# Patient Record
Sex: FEMALE | Race: ASIAN | NOT HISPANIC OR LATINO
[De-identification: names, ages, dates, MRNs, and addresses within clinical notes are randomized per-mention and may not be internally consistent; named-entity substitution may affect disease eponyms.]

---

## 2017-09-06 ENCOUNTER — APPOINTMENT (OUTPATIENT)
Dept: MRI IMAGING | Facility: HOSPITAL | Age: 67
End: 2017-09-06

## 2017-09-06 ENCOUNTER — OUTPATIENT (OUTPATIENT)
Dept: OUTPATIENT SERVICES | Facility: HOSPITAL | Age: 67
LOS: 1 days | Discharge: HOME | End: 2017-09-06
Payer: MEDICARE

## 2017-09-06 DIAGNOSIS — R41.3 OTHER AMNESIA: ICD-10-CM

## 2017-09-06 PROCEDURE — 70551 MRI BRAIN STEM W/O DYE: CPT | Mod: 26

## 2018-02-16 ENCOUNTER — EMERGENCY (EMERGENCY)
Facility: HOSPITAL | Age: 68
LOS: 0 days | Discharge: HOME | End: 2018-02-16
Attending: EMERGENCY MEDICINE
Payer: MEDICARE

## 2018-02-16 VITALS
DIASTOLIC BLOOD PRESSURE: 82 MMHG | RESPIRATION RATE: 16 BRPM | SYSTOLIC BLOOD PRESSURE: 175 MMHG | WEIGHT: 164.91 LBS | HEIGHT: 64 IN | TEMPERATURE: 97 F | HEART RATE: 60 BPM | OXYGEN SATURATION: 100 %

## 2018-02-16 VITALS
OXYGEN SATURATION: 100 % | SYSTOLIC BLOOD PRESSURE: 150 MMHG | HEART RATE: 60 BPM | RESPIRATION RATE: 17 BRPM | DIASTOLIC BLOOD PRESSURE: 76 MMHG

## 2018-02-16 DIAGNOSIS — R10.10 UPPER ABDOMINAL PAIN, UNSPECIFIED: ICD-10-CM

## 2018-02-16 DIAGNOSIS — R55 SYNCOPE AND COLLAPSE: ICD-10-CM

## 2018-02-16 DIAGNOSIS — R42 DIZZINESS AND GIDDINESS: ICD-10-CM

## 2018-02-16 DIAGNOSIS — E11.9 TYPE 2 DIABETES MELLITUS WITHOUT COMPLICATIONS: ICD-10-CM

## 2018-02-16 DIAGNOSIS — I10 ESSENTIAL (PRIMARY) HYPERTENSION: ICD-10-CM

## 2018-02-16 DIAGNOSIS — R51 HEADACHE: ICD-10-CM

## 2018-02-16 LAB
ALBUMIN SERPL ELPH-MCNC: 3.7 G/DL — SIGNIFICANT CHANGE UP (ref 3.3–5)
ALP SERPL-CCNC: 71 U/L — SIGNIFICANT CHANGE UP (ref 40–120)
ALT FLD-CCNC: 28 U/L — SIGNIFICANT CHANGE UP (ref 12–78)
ANION GAP SERPL CALC-SCNC: 11 MMOL/L — SIGNIFICANT CHANGE UP (ref 5–17)
APPEARANCE UR: CLEAR — SIGNIFICANT CHANGE UP
AST SERPL-CCNC: 34 U/L — SIGNIFICANT CHANGE UP (ref 15–37)
BASOPHILS # BLD AUTO: 0.02 K/UL — SIGNIFICANT CHANGE UP (ref 0–0.2)
BASOPHILS NFR BLD AUTO: 0.2 % — SIGNIFICANT CHANGE UP (ref 0–2)
BILIRUB SERPL-MCNC: 0.4 MG/DL — SIGNIFICANT CHANGE UP (ref 0.2–1.2)
BILIRUB UR-MCNC: NEGATIVE — SIGNIFICANT CHANGE UP
BUN SERPL-MCNC: 15 MG/DL — SIGNIFICANT CHANGE UP (ref 7–23)
CALCIUM SERPL-MCNC: 9.3 MG/DL — SIGNIFICANT CHANGE UP (ref 8.5–10.1)
CHLORIDE SERPL-SCNC: 102 MMOL/L — SIGNIFICANT CHANGE UP (ref 96–108)
CK MB CFR SERPL CALC: 2.5 NG/ML — SIGNIFICANT CHANGE UP (ref 0.5–3.6)
CO2 SERPL-SCNC: 26 MMOL/L — SIGNIFICANT CHANGE UP (ref 22–31)
COLOR SPEC: YELLOW — SIGNIFICANT CHANGE UP
CREAT SERPL-MCNC: 0.94 MG/DL — SIGNIFICANT CHANGE UP (ref 0.5–1.3)
DIFF PNL FLD: NEGATIVE — SIGNIFICANT CHANGE UP
EOSINOPHIL # BLD AUTO: 0.2 K/UL — SIGNIFICANT CHANGE UP (ref 0–0.5)
EOSINOPHIL NFR BLD AUTO: 2.3 % — SIGNIFICANT CHANGE UP (ref 0–6)
GLUCOSE BLDC GLUCOMTR-MCNC: 213 MG/DL — HIGH (ref 70–99)
GLUCOSE SERPL-MCNC: 215 MG/DL — HIGH (ref 70–99)
GLUCOSE UR QL: 1000 MG/DL
HCT VFR BLD CALC: 37.7 % — SIGNIFICANT CHANGE UP (ref 34.5–45)
HGB BLD-MCNC: 12.8 G/DL — SIGNIFICANT CHANGE UP (ref 11.5–15.5)
IMM GRANULOCYTES NFR BLD AUTO: 0.2 % — SIGNIFICANT CHANGE UP (ref 0–1.5)
INR BLD: 0.93 RATIO — SIGNIFICANT CHANGE UP (ref 0.88–1.16)
KETONES UR-MCNC: NEGATIVE — SIGNIFICANT CHANGE UP
LEUKOCYTE ESTERASE UR-ACNC: NEGATIVE — SIGNIFICANT CHANGE UP
LYMPHOCYTES # BLD AUTO: 1.77 K/UL — SIGNIFICANT CHANGE UP (ref 1–3.3)
LYMPHOCYTES # BLD AUTO: 20.2 % — SIGNIFICANT CHANGE UP (ref 13–44)
MAGNESIUM SERPL-MCNC: 2 MG/DL — SIGNIFICANT CHANGE UP (ref 1.6–2.6)
MCHC RBC-ENTMCNC: 27.4 PG — SIGNIFICANT CHANGE UP (ref 27–34)
MCHC RBC-ENTMCNC: 34 GM/DL — SIGNIFICANT CHANGE UP (ref 32–36)
MCV RBC AUTO: 80.6 FL — SIGNIFICANT CHANGE UP (ref 80–100)
MONOCYTES # BLD AUTO: 0.39 K/UL — SIGNIFICANT CHANGE UP (ref 0–0.9)
MONOCYTES NFR BLD AUTO: 4.4 % — SIGNIFICANT CHANGE UP (ref 2–14)
NEUTROPHILS # BLD AUTO: 6.37 K/UL — SIGNIFICANT CHANGE UP (ref 1.8–7.4)
NEUTROPHILS NFR BLD AUTO: 72.7 % — SIGNIFICANT CHANGE UP (ref 43–77)
NITRITE UR-MCNC: NEGATIVE — SIGNIFICANT CHANGE UP
NRBC # BLD: 0 /100 WBCS — SIGNIFICANT CHANGE UP (ref 0–0)
NT-PROBNP SERPL-SCNC: 63 PG/ML — SIGNIFICANT CHANGE UP (ref 0–125)
PH UR: 8 — SIGNIFICANT CHANGE UP (ref 5–8)
PLATELET # BLD AUTO: 213 K/UL — SIGNIFICANT CHANGE UP (ref 150–400)
POTASSIUM SERPL-MCNC: 4.1 MMOL/L — SIGNIFICANT CHANGE UP (ref 3.5–5.3)
POTASSIUM SERPL-SCNC: 4.1 MMOL/L — SIGNIFICANT CHANGE UP (ref 3.5–5.3)
PROT SERPL-MCNC: 7.9 GM/DL — SIGNIFICANT CHANGE UP (ref 6–8.3)
PROT UR-MCNC: NEGATIVE MG/DL — SIGNIFICANT CHANGE UP
PROTHROM AB SERPL-ACNC: 10.1 SEC — SIGNIFICANT CHANGE UP (ref 9.8–12.7)
RBC # BLD: 4.68 M/UL — SIGNIFICANT CHANGE UP (ref 3.8–5.2)
RBC # FLD: 13.6 % — SIGNIFICANT CHANGE UP (ref 10.3–14.5)
SODIUM SERPL-SCNC: 139 MMOL/L — SIGNIFICANT CHANGE UP (ref 135–145)
SP GR SPEC: 1.01 — SIGNIFICANT CHANGE UP (ref 1.01–1.02)
TROPONIN I SERPL-MCNC: <.015 NG/ML — SIGNIFICANT CHANGE UP (ref 0.01–0.04)
TROPONIN I SERPL-MCNC: <.015 NG/ML — SIGNIFICANT CHANGE UP (ref 0.01–0.04)
UROBILINOGEN FLD QL: NEGATIVE MG/DL — SIGNIFICANT CHANGE UP
WBC # BLD: 8.77 K/UL — SIGNIFICANT CHANGE UP (ref 3.8–10.5)
WBC # FLD AUTO: 8.77 K/UL — SIGNIFICANT CHANGE UP (ref 3.8–10.5)

## 2018-02-16 PROCEDURE — 99285 EMERGENCY DEPT VISIT HI MDM: CPT

## 2018-02-16 PROCEDURE — 71275 CT ANGIOGRAPHY CHEST: CPT | Mod: 26

## 2018-02-16 PROCEDURE — 70450 CT HEAD/BRAIN W/O DYE: CPT | Mod: 26

## 2018-02-16 PROCEDURE — 74174 CTA ABD&PLVS W/CONTRAST: CPT | Mod: 26

## 2018-02-16 RX ORDER — LABETALOL HCL 100 MG
5 TABLET ORAL ONCE
Qty: 0 | Refills: 0 | Status: COMPLETED | OUTPATIENT
Start: 2018-02-16 | End: 2018-02-16

## 2018-02-16 RX ORDER — LABETALOL HCL 100 MG
10 TABLET ORAL ONCE
Qty: 0 | Refills: 0 | Status: DISCONTINUED | OUTPATIENT
Start: 2018-02-16 | End: 2018-02-16

## 2018-02-16 RX ORDER — NIFEDIPINE 30 MG
60 TABLET, EXTENDED RELEASE 24 HR ORAL ONCE
Qty: 0 | Refills: 0 | Status: COMPLETED | OUTPATIENT
Start: 2018-02-16 | End: 2018-02-16

## 2018-02-16 RX ADMIN — Medication 60 MILLIGRAM(S): at 15:19

## 2018-02-16 RX ADMIN — Medication 5 MILLIGRAM(S): at 14:43
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
## 2018-02-16 NOTE — ED PROVIDER NOTE - MEDICAL DECISION MAKING DETAILS
patient pw likely presyncope. not likely acs given 2 negative troponins. possibly hypoglycemia since patient did not eat all morning. Another possibility is symptomatic hypertension, since resolving the marked hypertension with iv labetalol resolved symptoms. will have patient continue her regimen as prescribed. okay for dc. patient pw likely presyncope. not likely acs given 2 negative troponins. possibly hypoglycemia since patient did not eat all morning. Another possibility is symptomatic hypertension, since resolving the marked hypertension with iv labetalol resolved symptoms. will have patient continue her regimen as prescribed. As interpreted by ED physician, ECG is NSR with normal intervals/axis, no changes in QRS, no ST/T changes.   okay for dc.

## 2018-02-16 NOTE — ED ADULT NURSE NOTE - OBJECTIVE STATEMENT
BIBA after having an episode of feeling "light headed and nearly blacking out" reports having woken up right away, and was scared of what was not going on.

## 2018-02-17 LAB
CULTURE RESULTS: SIGNIFICANT CHANGE UP
SPECIMEN SOURCE: SIGNIFICANT CHANGE UP

## 2019-04-28 ENCOUNTER — EMERGENCY (EMERGENCY)
Facility: HOSPITAL | Age: 69
LOS: 0 days | Discharge: HOME | End: 2019-04-28
Attending: EMERGENCY MEDICINE
Payer: MEDICARE

## 2019-04-28 VITALS
OXYGEN SATURATION: 100 % | SYSTOLIC BLOOD PRESSURE: 143 MMHG | RESPIRATION RATE: 18 BRPM | TEMPERATURE: 98 F | HEART RATE: 63 BPM | DIASTOLIC BLOOD PRESSURE: 80 MMHG

## 2019-04-28 VITALS
TEMPERATURE: 98 F | SYSTOLIC BLOOD PRESSURE: 152 MMHG | RESPIRATION RATE: 19 BRPM | WEIGHT: 149.91 LBS | HEIGHT: 65 IN | HEART RATE: 67 BPM | OXYGEN SATURATION: 100 % | DIASTOLIC BLOOD PRESSURE: 77 MMHG

## 2019-04-28 DIAGNOSIS — R39.9 UNSPECIFIED SYMPTOMS AND SIGNS INVOLVING THE GENITOURINARY SYSTEM: ICD-10-CM

## 2019-04-28 DIAGNOSIS — I10 ESSENTIAL (PRIMARY) HYPERTENSION: ICD-10-CM

## 2019-04-28 DIAGNOSIS — B37.3 CANDIDIASIS OF VULVA AND VAGINA: ICD-10-CM

## 2019-04-28 DIAGNOSIS — E11.9 TYPE 2 DIABETES MELLITUS WITHOUT COMPLICATIONS: ICD-10-CM

## 2019-04-28 LAB
APPEARANCE UR: CLEAR — SIGNIFICANT CHANGE UP
BILIRUB UR-MCNC: NEGATIVE — SIGNIFICANT CHANGE UP
COLOR SPEC: YELLOW — SIGNIFICANT CHANGE UP
DIFF PNL FLD: NEGATIVE — SIGNIFICANT CHANGE UP
GLUCOSE UR QL: 1000 MG/DL
KETONES UR-MCNC: NEGATIVE — SIGNIFICANT CHANGE UP
LEUKOCYTE ESTERASE UR-ACNC: NEGATIVE — SIGNIFICANT CHANGE UP
NITRITE UR-MCNC: NEGATIVE — SIGNIFICANT CHANGE UP
PH UR: 8 — SIGNIFICANT CHANGE UP (ref 5–8)
PROT UR-MCNC: NEGATIVE MG/DL — SIGNIFICANT CHANGE UP
SP GR SPEC: 1.01 — SIGNIFICANT CHANGE UP (ref 1.01–1.02)
UROBILINOGEN FLD QL: NEGATIVE MG/DL — SIGNIFICANT CHANGE UP

## 2019-04-28 PROCEDURE — 99283 EMERGENCY DEPT VISIT LOW MDM: CPT

## 2019-04-28 RX ORDER — FLUCONAZOLE 150 MG/1
1 TABLET ORAL
Qty: 3 | Refills: 0
Start: 2019-04-28 | End: 2019-04-30

## 2019-04-28 RX ORDER — FLUCONAZOLE 150 MG/1
150 TABLET ORAL ONCE
Qty: 0 | Refills: 0 | Status: COMPLETED | OUTPATIENT
Start: 2019-04-28 | End: 2019-04-28

## 2019-04-28 RX ORDER — NYSTATIN CREAM 100000 [USP'U]/G
1 CREAM TOPICAL
Qty: 1 | Refills: 0
Start: 2019-04-28 | End: 2019-05-11

## 2019-04-28 RX ORDER — NYSTATIN CREAM 100000 [USP'U]/G
1 CREAM TOPICAL ONCE
Qty: 0 | Refills: 0 | Status: COMPLETED | OUTPATIENT
Start: 2019-04-28 | End: 2019-04-28

## 2019-04-28 RX ADMIN — FLUCONAZOLE 150 MILLIGRAM(S): 150 TABLET ORAL at 12:53

## 2019-04-28 RX ADMIN — NYSTATIN CREAM 1 APPLICATION(S): 100000 CREAM TOPICAL at 12:54

## 2019-04-28 NOTE — ED PROVIDER NOTE - OBJECTIVE STATEMENT
Pertinent PMH/PSH/FHx/SHx and Review of Systems contained within:  [id number]  69f hx dementia, htn, dm, hld pw pain in the vagina and urethra x4 months. patient notes symptoms all the time but romana with urinating. started in eligio, given many medications but it did not successfully treat her symptoms. she has no nausea, vomiting, fever, chills, abd pain, rash, bleeding, weakness, cp, sob, ha, vision loss, rhinorrhea.   Fh and Sh not otherwise contributory  ROS otherwise negative

## 2019-04-28 NOTE — ED PROVIDER NOTE - PHYSICAL EXAMINATION
Gen: Alert, NAD  Head: NC, AT   Eyes: PERRL, EOMI, normal lids/conjunctiva  ENT: normal hearing, patent oropharynx without erythema/exudate, uvula midline  Neck: supple, no tenderness, Trachea midline  Pulm: Bilateral BS, normal resp effort, no wheeze/stridor/retractions  CV: RRR, no M/R/G, 2+ radial and dp pulses bl, no edema  Abd: soft, NT/ND, +BS, no hepatosplenomegaly  Mskel: extremities x4 with normal ROM and no joint effusions. no ctl spine ttp.   Skin: vaginal intertriginous candidal infection   Neuro: AAOx3, no sensory/motor deficits, CN 2-12 intact

## 2019-04-28 NOTE — ED PROVIDER NOTE - CLINICAL SUMMARY MEDICAL DECISION MAKING FREE TEXT BOX
pt pw complaints of uti. but on exam, she is not having a vaginal infection. she is having a yeast infection. pt pw complaints of uti. but on exam, she is not having a vaginal infection. she is having a yeast infection. given the length of time it has gone on, will start on oral and topical anti-fungals.

## 2019-04-28 NOTE — ED PROVIDER NOTE - NSFOLLOWUPINSTRUCTIONS_ED_ALL_ED_FT
This is NOT as urine infection.    This is a YEAST/FUNGUS infection. It must be treated with ANTI-FUNGALS and not ANTI-BIOTICS.    Take the FLUCONAZOLE tablet once every week.    Apply the NYSTATIN ointment twice daily for 2 weeks.      ?? ????? ???????? ?? ??? ??? ???? ?? ?    ?? ?? ????/??? ??????? ?? ? ???? ???? ????-????? ?? ???? ????? ? ?? ????-????????? ?? ?    ?? ????? ?? ??? ???????????? ?????? ??? ?    NYSTATIN ???? 2 ?????? ?? ??? ?? ??? ????? ???? ???? ? This is NOT as urine infection.    This is a YEAST/FUNGUS infection. It must be treated with ANTI-FUNGALS and not ANTI-BIOTICS.    Take the FLUCONAZOLE tablet once every week.    Apply the NYSTATIN ointment twice daily for 2 weeks.

## 2019-04-29 LAB
CULTURE RESULTS: SIGNIFICANT CHANGE UP
SPECIMEN SOURCE: SIGNIFICANT CHANGE UP

## 2019-10-03 ENCOUNTER — EMERGENCY (EMERGENCY)
Facility: HOSPITAL | Age: 69
LOS: 0 days | Discharge: HOME | End: 2019-10-03
Attending: STUDENT IN AN ORGANIZED HEALTH CARE EDUCATION/TRAINING PROGRAM
Payer: COMMERCIAL

## 2019-10-03 VITALS
TEMPERATURE: 98 F | WEIGHT: 149.91 LBS | DIASTOLIC BLOOD PRESSURE: 83 MMHG | SYSTOLIC BLOOD PRESSURE: 171 MMHG | OXYGEN SATURATION: 97 % | HEIGHT: 65 IN | RESPIRATION RATE: 19 BRPM | HEART RATE: 79 BPM

## 2019-10-03 VITALS
HEART RATE: 66 BPM | TEMPERATURE: 98 F | RESPIRATION RATE: 19 BRPM | DIASTOLIC BLOOD PRESSURE: 86 MMHG | OXYGEN SATURATION: 97 % | SYSTOLIC BLOOD PRESSURE: 156 MMHG

## 2019-10-03 DIAGNOSIS — R41.0 DISORIENTATION, UNSPECIFIED: ICD-10-CM

## 2019-10-03 DIAGNOSIS — E11.9 TYPE 2 DIABETES MELLITUS WITHOUT COMPLICATIONS: ICD-10-CM

## 2019-10-03 DIAGNOSIS — M54.9 DORSALGIA, UNSPECIFIED: ICD-10-CM

## 2019-10-03 DIAGNOSIS — R10.9 UNSPECIFIED ABDOMINAL PAIN: ICD-10-CM

## 2019-10-03 DIAGNOSIS — I10 ESSENTIAL (PRIMARY) HYPERTENSION: ICD-10-CM
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
## 2019-10-03 DIAGNOSIS — E78.5 HYPERLIPIDEMIA, UNSPECIFIED: ICD-10-CM

## 2019-10-03 DIAGNOSIS — R51 HEADACHE: ICD-10-CM
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
## 2019-10-03 DIAGNOSIS — Z04.1 ENCOUNTER FOR EXAMINATION AND OBSERVATION FOLLOWING TRANSPORT ACCIDENT: ICD-10-CM

## 2019-10-03 DIAGNOSIS — S16.1XXA STRAIN OF MUSCLE, FASCIA AND TENDON AT NECK LEVEL, INITIAL ENCOUNTER: ICD-10-CM

## 2019-10-03 LAB
ALBUMIN SERPL ELPH-MCNC: 3.4 G/DL — SIGNIFICANT CHANGE UP (ref 3.3–5)
ALP SERPL-CCNC: 61 U/L — SIGNIFICANT CHANGE UP (ref 40–120)
ALT FLD-CCNC: 19 U/L — SIGNIFICANT CHANGE UP (ref 12–78)
ANION GAP SERPL CALC-SCNC: 7 MMOL/L — SIGNIFICANT CHANGE UP (ref 5–17)
AST SERPL-CCNC: 12 U/L — LOW (ref 15–37)
BASOPHILS # BLD AUTO: 0.01 K/UL — SIGNIFICANT CHANGE UP (ref 0–0.2)
BASOPHILS NFR BLD AUTO: 0.1 % — SIGNIFICANT CHANGE UP (ref 0–2)
BILIRUB SERPL-MCNC: 0.5 MG/DL — SIGNIFICANT CHANGE UP (ref 0.2–1.2)
BUN SERPL-MCNC: 11 MG/DL — SIGNIFICANT CHANGE UP (ref 7–23)
CALCIUM SERPL-MCNC: 8.5 MG/DL — SIGNIFICANT CHANGE UP (ref 8.5–10.1)
CHLORIDE SERPL-SCNC: 106 MMOL/L — SIGNIFICANT CHANGE UP (ref 96–108)
CO2 SERPL-SCNC: 24 MMOL/L — SIGNIFICANT CHANGE UP (ref 22–31)
CREAT SERPL-MCNC: 0.9 MG/DL — SIGNIFICANT CHANGE UP (ref 0.5–1.3)
EOSINOPHIL # BLD AUTO: 0.19 K/UL — SIGNIFICANT CHANGE UP (ref 0–0.5)
EOSINOPHIL NFR BLD AUTO: 2.5 % — SIGNIFICANT CHANGE UP (ref 0–6)
GLUCOSE SERPL-MCNC: 293 MG/DL — HIGH (ref 70–99)
HCT VFR BLD CALC: 39.2 % — SIGNIFICANT CHANGE UP (ref 34.5–45)
HGB BLD-MCNC: 13.2 G/DL — SIGNIFICANT CHANGE UP (ref 11.5–15.5)
IMM GRANULOCYTES NFR BLD AUTO: 0.3 % — SIGNIFICANT CHANGE UP (ref 0–1.5)
LYMPHOCYTES # BLD AUTO: 2.14 K/UL — SIGNIFICANT CHANGE UP (ref 1–3.3)
LYMPHOCYTES # BLD AUTO: 27.8 % — SIGNIFICANT CHANGE UP (ref 13–44)
MCHC RBC-ENTMCNC: 29.1 PG — SIGNIFICANT CHANGE UP (ref 27–34)
MCHC RBC-ENTMCNC: 33.7 GM/DL — SIGNIFICANT CHANGE UP (ref 32–36)
MCV RBC AUTO: 86.3 FL — SIGNIFICANT CHANGE UP (ref 80–100)
MONOCYTES # BLD AUTO: 0.46 K/UL — SIGNIFICANT CHANGE UP (ref 0–0.9)
MONOCYTES NFR BLD AUTO: 6 % — SIGNIFICANT CHANGE UP (ref 2–14)
NEUTROPHILS # BLD AUTO: 4.89 K/UL — SIGNIFICANT CHANGE UP (ref 1.8–7.4)
NEUTROPHILS NFR BLD AUTO: 63.3 % — SIGNIFICANT CHANGE UP (ref 43–77)
NRBC # BLD: 0 /100 WBCS — SIGNIFICANT CHANGE UP (ref 0–0)
PLATELET # BLD AUTO: 178 K/UL — SIGNIFICANT CHANGE UP (ref 150–400)
POTASSIUM SERPL-MCNC: 3.9 MMOL/L — SIGNIFICANT CHANGE UP (ref 3.5–5.3)
POTASSIUM SERPL-SCNC: 3.9 MMOL/L — SIGNIFICANT CHANGE UP (ref 3.5–5.3)
PROT SERPL-MCNC: 7.5 GM/DL — SIGNIFICANT CHANGE UP (ref 6–8.3)
RBC # BLD: 4.54 M/UL — SIGNIFICANT CHANGE UP (ref 3.8–5.2)
RBC # FLD: 12.2 % — SIGNIFICANT CHANGE UP (ref 10.3–14.5)
SODIUM SERPL-SCNC: 137 MMOL/L — SIGNIFICANT CHANGE UP (ref 135–145)
WBC # BLD: 7.71 K/UL — SIGNIFICANT CHANGE UP (ref 3.8–10.5)
WBC # FLD AUTO: 7.71 K/UL — SIGNIFICANT CHANGE UP (ref 3.8–10.5)

## 2019-10-03 PROCEDURE — 74177 CT ABD & PELVIS W/CONTRAST: CPT | Mod: 26

## 2019-10-03 PROCEDURE — 70450 CT HEAD/BRAIN W/O DYE: CPT | Mod: 26

## 2019-10-03 PROCEDURE — 72125 CT NECK SPINE W/O DYE: CPT | Mod: 26

## 2019-10-03 PROCEDURE — 71045 X-RAY EXAM CHEST 1 VIEW: CPT | Mod: 26

## 2019-10-03 PROCEDURE — 99284 EMERGENCY DEPT VISIT MOD MDM: CPT

## 2019-10-03 RX ORDER — ACETAMINOPHEN 500 MG
650 TABLET ORAL ONCE
Refills: 0 | Status: COMPLETED | OUTPATIENT
Start: 2019-10-03 | End: 2019-10-03

## 2019-10-03 RX ADMIN — Medication 650 MILLIGRAM(S): at 19:15
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
## 2019-10-03 NOTE — ED ADULT NURSE NOTE - NSIMPLEMENTINTERV_GEN_ALL_ED
Implemented All Universal Safety Interventions:  [place] to call system. Call bell, personal items and telephone within reach. Instruct patient to call for assistance. Room bathroom lighting operational. Non-slip footwear when patient is off stretcher. Physically safe environment: no spills, clutter or unnecessary equipment. Stretcher in lowest position, wheels locked, appropriate side rails in place.
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
## 2019-10-03 NOTE — ED ADULT NURSE NOTE - ED STAT RN HANDOFF DETAILS
Report given to Katherine HILLIARD RN, pts history, current condition and reason for admission discussed, safety concerns addressed and reviewed, pt currently in stable condition, IV flushes for patency and site shows no signs or symptoms of infiltrate, dressing is clean dry and intact, pt family is aware of plan of care. Pt education deemed successful at time of report after patient demonstrates successful teach back for proficiency.

## 2019-10-03 NOTE — ED PROVIDER NOTE - CARE PLAN
Principal Discharge DX:	Headache  Secondary Diagnosis:	Cervical strain  Secondary Diagnosis:	Abdominal pain
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
## 2019-10-03 NOTE — ED PROVIDER NOTE - PATIENT PORTAL LINK FT
You can access the FollowMyHealth Patient Portal offered by [facility] by registering at the following website: [email or web address]. By joining [employer]’s FollowMyHealth portal, you will also be able to view your health information using other applications (apps) compatible with our system.
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
## 2021-08-08 NOTE — ED ADULT NURSE NOTE - NS ED NURSE LEVEL OF CONSCIOUSNESS AFFECT
Patient in no apparent distress. in bed with mother. meds administered as per orders. will continue to monitor. Appropriate/Calm

## 2022-04-16 ENCOUNTER — INPATIENT (INPATIENT)
Facility: HOSPITAL | Age: 72
LOS: 24 days | Discharge: HOME | End: 2022-05-11
Attending: INTERNAL MEDICINE | Admitting: INTERNAL MEDICINE
Payer: MEDICARE

## 2022-04-16 VITALS
SYSTOLIC BLOOD PRESSURE: 207 MMHG | DIASTOLIC BLOOD PRESSURE: 128 MMHG | TEMPERATURE: 99 F | RESPIRATION RATE: 18 BRPM | HEART RATE: 108 BPM | OXYGEN SATURATION: 98 % | HEIGHT: 65 IN | WEIGHT: 175.05 LBS

## 2022-04-16 LAB
ALBUMIN SERPL ELPH-MCNC: 3 G/DL — LOW (ref 3.3–5)
ALP SERPL-CCNC: 74 U/L — SIGNIFICANT CHANGE UP (ref 40–120)
ALT FLD-CCNC: 26 U/L — SIGNIFICANT CHANGE UP (ref 12–78)
ANION GAP SERPL CALC-SCNC: 11 MMOL/L — SIGNIFICANT CHANGE UP (ref 5–17)
APTT BLD: 30.8 SEC — SIGNIFICANT CHANGE UP (ref 27.5–35.5)
AST SERPL-CCNC: 19 U/L — SIGNIFICANT CHANGE UP (ref 15–37)
BASOPHILS # BLD AUTO: 0.04 K/UL — SIGNIFICANT CHANGE UP (ref 0–0.2)
BASOPHILS NFR BLD AUTO: 0.5 % — SIGNIFICANT CHANGE UP (ref 0–2)
BILIRUB SERPL-MCNC: 0.3 MG/DL — SIGNIFICANT CHANGE UP (ref 0.2–1.2)
BUN SERPL-MCNC: 13 MG/DL — SIGNIFICANT CHANGE UP (ref 7–23)
CALCIUM SERPL-MCNC: 8.8 MG/DL — SIGNIFICANT CHANGE UP (ref 8.5–10.1)
CHLORIDE SERPL-SCNC: 94 MMOL/L — LOW (ref 96–108)
CO2 SERPL-SCNC: 24 MMOL/L — SIGNIFICANT CHANGE UP (ref 22–31)
CREAT SERPL-MCNC: 0.85 MG/DL — SIGNIFICANT CHANGE UP (ref 0.5–1.3)
EGFR: 72 ML/MIN/1.73M2 — SIGNIFICANT CHANGE UP
EOSINOPHIL # BLD AUTO: 0.23 K/UL — SIGNIFICANT CHANGE UP (ref 0–0.5)
EOSINOPHIL NFR BLD AUTO: 2.6 % — SIGNIFICANT CHANGE UP (ref 0–6)
FLUAV AG NPH QL: SIGNIFICANT CHANGE UP
FLUBV AG NPH QL: SIGNIFICANT CHANGE UP
GLUCOSE BLDC GLUCOMTR-MCNC: 239 MG/DL — HIGH (ref 70–99)
GLUCOSE SERPL-MCNC: 181 MG/DL — HIGH (ref 70–99)
HCT VFR BLD CALC: 36.6 % — SIGNIFICANT CHANGE UP (ref 34.5–45)
HGB BLD-MCNC: 12.7 G/DL — SIGNIFICANT CHANGE UP (ref 11.5–15.5)
IMM GRANULOCYTES NFR BLD AUTO: 0.5 % — SIGNIFICANT CHANGE UP (ref 0–1.5)
INR BLD: 1.12 RATIO — SIGNIFICANT CHANGE UP (ref 0.88–1.16)
LYMPHOCYTES # BLD AUTO: 1.9 K/UL — SIGNIFICANT CHANGE UP (ref 1–3.3)
LYMPHOCYTES # BLD AUTO: 21.6 % — SIGNIFICANT CHANGE UP (ref 13–44)
MCHC RBC-ENTMCNC: 27.7 PG — SIGNIFICANT CHANGE UP (ref 27–34)
MCHC RBC-ENTMCNC: 34.7 G/DL — SIGNIFICANT CHANGE UP (ref 32–36)
MCV RBC AUTO: 79.9 FL — LOW (ref 80–100)
MONOCYTES # BLD AUTO: 0.57 K/UL — SIGNIFICANT CHANGE UP (ref 0–0.9)
MONOCYTES NFR BLD AUTO: 6.5 % — SIGNIFICANT CHANGE UP (ref 2–14)
NEUTROPHILS # BLD AUTO: 6.02 K/UL — SIGNIFICANT CHANGE UP (ref 1.8–7.4)
NEUTROPHILS NFR BLD AUTO: 68.3 % — SIGNIFICANT CHANGE UP (ref 43–77)
NRBC # BLD: 0 /100 WBCS — SIGNIFICANT CHANGE UP (ref 0–0)
PLATELET # BLD AUTO: 286 K/UL — SIGNIFICANT CHANGE UP (ref 150–400)
POTASSIUM SERPL-MCNC: 3.8 MMOL/L — SIGNIFICANT CHANGE UP (ref 3.5–5.3)
POTASSIUM SERPL-SCNC: 3.8 MMOL/L — SIGNIFICANT CHANGE UP (ref 3.5–5.3)
PROT SERPL-MCNC: 6.7 GM/DL — SIGNIFICANT CHANGE UP (ref 6–8.3)
PROTHROM AB SERPL-ACNC: 13.4 SEC — SIGNIFICANT CHANGE UP (ref 10.5–13.4)
RBC # BLD: 4.58 M/UL — SIGNIFICANT CHANGE UP (ref 3.8–5.2)
RBC # FLD: 12.4 % — SIGNIFICANT CHANGE UP (ref 10.3–14.5)
SARS-COV-2 RNA SPEC QL NAA+PROBE: SIGNIFICANT CHANGE UP
SODIUM SERPL-SCNC: 129 MMOL/L — LOW (ref 135–145)
TROPONIN I, HIGH SENSITIVITY RESULT: 13.7 NG/L — SIGNIFICANT CHANGE UP
WBC # BLD: 8.8 K/UL — SIGNIFICANT CHANGE UP (ref 3.8–10.5)
WBC # FLD AUTO: 8.8 K/UL — SIGNIFICANT CHANGE UP (ref 3.8–10.5)

## 2022-04-16 PROCEDURE — 99053 MED SERV 10PM-8AM 24 HR FAC: CPT

## 2022-04-16 PROCEDURE — 70450 CT HEAD/BRAIN W/O DYE: CPT | Mod: 26,MA

## 2022-04-16 PROCEDURE — 71045 X-RAY EXAM CHEST 1 VIEW: CPT | Mod: 26

## 2022-04-16 PROCEDURE — 93010 ELECTROCARDIOGRAM REPORT: CPT

## 2022-04-16 PROCEDURE — 99285 EMERGENCY DEPT VISIT HI MDM: CPT

## 2022-04-16 RX ORDER — SODIUM CHLORIDE 9 MG/ML
1000 INJECTION INTRAMUSCULAR; INTRAVENOUS; SUBCUTANEOUS
Refills: 0 | Status: DISCONTINUED | OUTPATIENT
Start: 2022-04-16 | End: 2022-04-18

## 2022-04-16 RX ADMIN — SODIUM CHLORIDE 125 MILLILITER(S): 9 INJECTION INTRAMUSCULAR; INTRAVENOUS; SUBCUTANEOUS at 18:48

## 2022-04-16 NOTE — ED PROVIDER NOTE - ENMT, MLM
Airway patent, Nasal mucosa clear. Mouth with normal mucosa. Mildly dry mucus membrane.  Throat has no vesicles, no oropharyngeal exudates and uvula is midline. Airway patent, Nasal mucosa clear. Mildly dry mucus membrane.

## 2022-04-16 NOTE — ED PROVIDER NOTE - CLINICAL SUMMARY MEDICAL DECISION MAKING FREE TEXT BOX
AMS likely delirium, r/o pneumonia, myocardial injury, UTI. COVID swab r/o COVID and flu. Acute intracranial process, obtain basic lab chest X-ray, EKG, UA, CT head. Give normal saline, and reassess for improvement. AMS likely delirium, r/o pneumonia, myocardial injury, UTI. COVID swab r/o COVID and flu. Acute intracranial process, obtain basic lab chest X-ray, EKG, UA, CT head. Give normal saline, and reassess for improvement.    Labs and CT and CXR unremarkable. Covid negative. Pending UA. Patient signed out to Dr. Lara, likely admit for AMS. AMS likely delirium, r/o pneumonia, myocardial injury, UTI. COVID swab r/o COVID and flu. Acute intracranial process, obtain basic lab chest X-ray, EKG, UA, CT head. Give normal saline, and reassess for improvement.    Labs and CT with mild increase of meningioma size and CXR unremarkable. Covid negative. Pending UA. Patient signed out to Dr. Lara, likely admit for AMS.

## 2022-04-16 NOTE — ED PROVIDER NOTE - NSICDXPASTMEDICALHX_GEN_ALL_CORE_FT
PAST MEDICAL HISTORY:  DM II (diabetes mellitus, type II), controlled     HLD (hyperlipidemia)     HTN (hypertension)

## 2022-04-16 NOTE — ED ADULT NURSE NOTE - OBJECTIVE STATEMENT
pt biba suspected fall bump to right posterior head. pt non verbal, as per sukhwinder baseline dementia, new onset altered mental status for three days not speaking at all and urinating on self.  history of dementia, HTN, high cholesterol , generalized weakness

## 2022-04-16 NOTE — ED PROVIDER NOTE - OBJECTIVE STATEMENT
Pt is a 72 year old female with PMH of dementia, HTN, HLD, who presents to the ED today for AMS. Pt hx limited by mental status, hx provided by daughter at bedside [phone]. Symptoms last for 3 days and are associated with urinary incontinence. Pt and daughter told by PMD to come in for stroke eval. No new symptoms today.

## 2022-04-16 NOTE — ED PROVIDER NOTE - NS_ ATTENDINGSCRIBEDETAILS _ED_A_ED_FT
I agree with the scribe's documentation and I performed the history, physical exam and medical decisionmaking

## 2022-04-16 NOTE — ED ADULT TRIAGE NOTE - CHIEF COMPLAINT QUOTE
altered mental status for three days history of dementia, HTN, high cholesterol , generalized weakness

## 2022-04-16 NOTE — ED PROVIDER NOTE - PROGRESS NOTE DETAILS
pt signed out to med from dr miguel, pt presented with increased confusion and urinary incontinenc, ?uti,, ct negative, ua pending no urine available yet, pt pending straight cath

## 2022-04-17 DIAGNOSIS — G93.41 METABOLIC ENCEPHALOPATHY: ICD-10-CM

## 2022-04-17 DIAGNOSIS — I63.9 CEREBRAL INFARCTION, UNSPECIFIED: ICD-10-CM

## 2022-04-17 DIAGNOSIS — I10 ESSENTIAL (PRIMARY) HYPERTENSION: ICD-10-CM

## 2022-04-17 DIAGNOSIS — E87.1 HYPO-OSMOLALITY AND HYPONATREMIA: ICD-10-CM

## 2022-04-17 DIAGNOSIS — F03.90 UNSPECIFIED DEMENTIA WITHOUT BEHAVIORAL DISTURBANCE: ICD-10-CM

## 2022-04-17 DIAGNOSIS — E78.5 HYPERLIPIDEMIA, UNSPECIFIED: ICD-10-CM

## 2022-04-17 LAB
APPEARANCE UR: CLEAR — SIGNIFICANT CHANGE UP
BILIRUB UR-MCNC: NEGATIVE — SIGNIFICANT CHANGE UP
COLOR SPEC: YELLOW — SIGNIFICANT CHANGE UP
DIFF PNL FLD: NEGATIVE — SIGNIFICANT CHANGE UP
GLUCOSE BLDC GLUCOMTR-MCNC: 157 MG/DL — HIGH (ref 70–99)
GLUCOSE BLDC GLUCOMTR-MCNC: 161 MG/DL — HIGH (ref 70–99)
GLUCOSE BLDC GLUCOMTR-MCNC: 181 MG/DL — HIGH (ref 70–99)
GLUCOSE BLDC GLUCOMTR-MCNC: 202 MG/DL — HIGH (ref 70–99)
GLUCOSE BLDC GLUCOMTR-MCNC: 262 MG/DL — HIGH (ref 70–99)
GLUCOSE BLDC GLUCOMTR-MCNC: 304 MG/DL — HIGH (ref 70–99)
GLUCOSE UR QL: 100 MG/DL
KETONES UR-MCNC: NEGATIVE — SIGNIFICANT CHANGE UP
LEUKOCYTE ESTERASE UR-ACNC: NEGATIVE — SIGNIFICANT CHANGE UP
NITRITE UR-MCNC: NEGATIVE — SIGNIFICANT CHANGE UP
OSMOLALITY SERPL: 283 MOSMOL/KG — SIGNIFICANT CHANGE UP (ref 280–301)
PH UR: 8 — SIGNIFICANT CHANGE UP (ref 5–8)
PROT UR-MCNC: NEGATIVE MG/DL — SIGNIFICANT CHANGE UP
SP GR SPEC: 1.01 — SIGNIFICANT CHANGE UP (ref 1.01–1.02)
UROBILINOGEN FLD QL: NEGATIVE MG/DL — SIGNIFICANT CHANGE UP

## 2022-04-17 PROCEDURE — 99233 SBSQ HOSP IP/OBS HIGH 50: CPT

## 2022-04-17 RX ORDER — INSULIN LISPRO 100/ML
VIAL (ML) SUBCUTANEOUS
Refills: 0 | Status: DISCONTINUED | OUTPATIENT
Start: 2022-04-17 | End: 2022-04-18

## 2022-04-17 RX ORDER — MEMANTINE HYDROCHLORIDE 10 MG/1
14 TABLET ORAL DAILY
Refills: 0 | Status: DISCONTINUED | OUTPATIENT
Start: 2022-04-17 | End: 2022-04-17

## 2022-04-17 RX ORDER — DONEPEZIL HYDROCHLORIDE 10 MG/1
10 TABLET, FILM COATED ORAL AT BEDTIME
Refills: 0 | Status: DISCONTINUED | OUTPATIENT
Start: 2022-04-17 | End: 2022-05-11

## 2022-04-17 RX ORDER — DEXTROSE 50 % IN WATER 50 %
15 SYRINGE (ML) INTRAVENOUS ONCE
Refills: 0 | Status: DISCONTINUED | OUTPATIENT
Start: 2022-04-17 | End: 2022-04-18

## 2022-04-17 RX ORDER — ACETAMINOPHEN 500 MG
650 TABLET ORAL EVERY 6 HOURS
Refills: 0 | Status: DISCONTINUED | OUTPATIENT
Start: 2022-04-17 | End: 2022-05-11

## 2022-04-17 RX ORDER — HYDROCHLOROTHIAZIDE 25 MG
25 TABLET ORAL DAILY
Refills: 0 | Status: DISCONTINUED | OUTPATIENT
Start: 2022-04-18 | End: 2022-04-18

## 2022-04-17 RX ORDER — HYDRALAZINE HCL 50 MG
25 TABLET ORAL DAILY
Refills: 0 | Status: DISCONTINUED | OUTPATIENT
Start: 2022-04-18 | End: 2022-04-18

## 2022-04-17 RX ORDER — SODIUM CHLORIDE 9 MG/ML
1000 INJECTION, SOLUTION INTRAVENOUS
Refills: 0 | Status: DISCONTINUED | OUTPATIENT
Start: 2022-04-17 | End: 2022-04-18

## 2022-04-17 RX ORDER — LANOLIN ALCOHOL/MO/W.PET/CERES
3 CREAM (GRAM) TOPICAL AT BEDTIME
Refills: 0 | Status: DISCONTINUED | OUTPATIENT
Start: 2022-04-17 | End: 2022-05-11

## 2022-04-17 RX ORDER — LISINOPRIL 2.5 MG/1
40 TABLET ORAL DAILY
Refills: 0 | Status: DISCONTINUED | OUTPATIENT
Start: 2022-04-17 | End: 2022-04-18

## 2022-04-17 RX ORDER — ENOXAPARIN SODIUM 100 MG/ML
40 INJECTION SUBCUTANEOUS EVERY 24 HOURS
Refills: 0 | Status: DISCONTINUED | OUTPATIENT
Start: 2022-04-17 | End: 2022-05-11

## 2022-04-17 RX ORDER — ATORVASTATIN CALCIUM 80 MG/1
40 TABLET, FILM COATED ORAL AT BEDTIME
Refills: 0 | Status: DISCONTINUED | OUTPATIENT
Start: 2022-04-17 | End: 2022-05-11

## 2022-04-17 RX ORDER — MEMANTINE HYDROCHLORIDE 10 MG/1
10 TABLET ORAL DAILY
Refills: 0 | Status: DISCONTINUED | OUTPATIENT
Start: 2022-04-17 | End: 2022-05-11

## 2022-04-17 RX ORDER — DEXTROSE 50 % IN WATER 50 %
25 SYRINGE (ML) INTRAVENOUS ONCE
Refills: 0 | Status: DISCONTINUED | OUTPATIENT
Start: 2022-04-17 | End: 2022-04-18

## 2022-04-17 RX ORDER — ONDANSETRON 8 MG/1
4 TABLET, FILM COATED ORAL EVERY 8 HOURS
Refills: 0 | Status: DISCONTINUED | OUTPATIENT
Start: 2022-04-17 | End: 2022-05-11

## 2022-04-17 RX ORDER — GLUCAGON INJECTION, SOLUTION 0.5 MG/.1ML
1 INJECTION, SOLUTION SUBCUTANEOUS ONCE
Refills: 0 | Status: DISCONTINUED | OUTPATIENT
Start: 2022-04-17 | End: 2022-04-18

## 2022-04-17 RX ORDER — ASPIRIN/CALCIUM CARB/MAGNESIUM 324 MG
81 TABLET ORAL DAILY
Refills: 0 | Status: DISCONTINUED | OUTPATIENT
Start: 2022-04-17 | End: 2022-05-11

## 2022-04-17 RX ORDER — GABAPENTIN 400 MG/1
100 CAPSULE ORAL THREE TIMES A DAY
Refills: 0 | Status: DISCONTINUED | OUTPATIENT
Start: 2022-04-17 | End: 2022-05-11

## 2022-04-17 RX ADMIN — LISINOPRIL 40 MILLIGRAM(S): 2.5 TABLET ORAL at 12:38

## 2022-04-17 RX ADMIN — Medication 3 MILLIGRAM(S): at 21:21

## 2022-04-17 RX ADMIN — Medication 4: at 18:17

## 2022-04-17 RX ADMIN — Medication 1: at 11:53

## 2022-04-17 RX ADMIN — GABAPENTIN 100 MILLIGRAM(S): 400 CAPSULE ORAL at 21:21

## 2022-04-17 RX ADMIN — ENOXAPARIN SODIUM 40 MILLIGRAM(S): 100 INJECTION SUBCUTANEOUS at 11:53

## 2022-04-17 RX ADMIN — ATORVASTATIN CALCIUM 40 MILLIGRAM(S): 80 TABLET, FILM COATED ORAL at 21:21

## 2022-04-17 RX ADMIN — SODIUM CHLORIDE 125 MILLILITER(S): 9 INJECTION INTRAMUSCULAR; INTRAVENOUS; SUBCUTANEOUS at 15:46

## 2022-04-17 RX ADMIN — Medication 81 MILLIGRAM(S): at 11:54

## 2022-04-17 RX ADMIN — SODIUM CHLORIDE 125 MILLILITER(S): 9 INJECTION INTRAMUSCULAR; INTRAVENOUS; SUBCUTANEOUS at 05:24

## 2022-04-17 RX ADMIN — GABAPENTIN 100 MILLIGRAM(S): 400 CAPSULE ORAL at 13:43

## 2022-04-17 RX ADMIN — DONEPEZIL HYDROCHLORIDE 10 MILLIGRAM(S): 10 TABLET, FILM COATED ORAL at 21:21

## 2022-04-17 RX ADMIN — SODIUM CHLORIDE 125 MILLILITER(S): 9 INJECTION INTRAMUSCULAR; INTRAVENOUS; SUBCUTANEOUS at 21:21

## 2022-04-17 RX ADMIN — MEMANTINE HYDROCHLORIDE 10 MILLIGRAM(S): 10 TABLET ORAL at 12:38

## 2022-04-17 NOTE — PHYSICAL THERAPY INITIAL EVALUATION ADULT - GAIT DEVIATIONS NOTED, PT EVAL
flexion posture/decreased rosalinda/increased time in double stance/footdrop/decreased step length/decreased stride length/decreased weight-shifting ability

## 2022-04-17 NOTE — PHYSICAL THERAPY INITIAL EVALUATION ADULT - ADDITIONAL COMMENTS
There are 3 steps, w/o rail, at the entry of the house and no steps to negotiate at home. Pt was able to ambulate c assist x1 indoors up to 3 days ago. Pt has a rollator for outdoor ambulation.

## 2022-04-17 NOTE — PATIENT PROFILE ADULT - FALL HARM RISK - HARM RISK INTERVENTIONS
Assistance with ambulation/Assistance OOB with selected safe patient handling equipment/Communicate Risk of Fall with Harm to all staff/Monitor for mental status changes/Move patient closer to nurses' station/Reinforce activity limits and safety measures with patient and family/Reorient to person, place and time as needed/Tailored Fall Risk Interventions/Toileting schedule using arm’s reach rule for commode and bathroom/Use of alarms - bed, chair and/or voice tab/Visual Cue: Yellow wristband and red socks/Bed in lowest position, wheels locked, appropriate side rails in place/Call bell, personal items and telephone in reach/Instruct patient to call for assistance before getting out of bed or chair/Non-slip footwear when patient is out of bed/[place] to call system/Physically safe environment - no spills, clutter or unnecessary equipment/Purposeful Proactive Rounding/Room/bathroom lighting operational, light cord in reach

## 2022-04-17 NOTE — H&P ADULT - HISTORY OF PRESENT ILLNESS
This is a 72 year old female with PMH of dementia, HTN, HLD, who presents to the ED today for AMS. History provided by daughter at bedside [phone]. She has been altered for 3 days and also reports urinary incontinence. no fevers, chilld or complains of pain, no apparent focal neuro deficits. In ED /128. labs only significant for na 129. CT head shows old meningioma slightly increased in size, no cva.

## 2022-04-17 NOTE — PROGRESS NOTE ADULT - ASSESSMENT
This is a 72 year old female with PMH of dementia, HTN, HLD, who presents to the ED today for AMS. History provided by daughter at bedside [phone]. She has been altered for 3 days and also reports urinary incontinence. no fevers, chilld or complains of pain, no apparent focal neuro deficits. In ED /128. labs only significant for na 129. CT head shows old meningioma slightly increased in size, no cva.     acute ischemic vs metabolic encephalopathy  r/o cva   hyponatremia   htn   hld  dementia  -brain MR  -Neuro consult   -TTE  -telemetry   -f/u tfts. lipid panel a1c  -no evidence of infection   -urine lytes, NS IV   -asa statin  -permissive htn till tomorrow  -urine lytes

## 2022-04-17 NOTE — H&P ADULT - ASSESSMENT
This is a 72 year old female with PMH of dementia, HTN, HLD, who presents to the ED today for AMS. History provided by daughter at bedside [phone]. She has been altered for 3 days and also reports urinary incontinence. no fevers, chilld or complains of pain, no apparent focal neuro deficits. In ED /128. labs only significant for na 129. CT head shows old meningioma slightly increased in size, no cva.     acute ischemic vs metabolic encephalopathy  r/o cva   hyoponatremia   htn   hld  dementia  -brain MR  -Neuro consult   -TTE  -telemetry   -f/u tfts. lipid panel a1c  -no evidence of infection   -urine lytes, NS IV   -asa statin  -permissive htn till tomorrow

## 2022-04-17 NOTE — H&P ADULT - NSHPPHYSICALEXAM_GEN_ALL_CORE
Constitutional: NAD AAO confused  HEENT PERRLA EOMI  CV RRR S1S2  Pulm CTA b/l   GI soft nontender nondistended + BS   Neuro CN II-XII grossly intact moves all extremities   Extremities no edema or calf tenderness

## 2022-04-17 NOTE — PHYSICAL THERAPY INITIAL EVALUATION ADULT - TRANSFER TRAINING, PT EVAL
Pt will perform sit to stand to sit transfers without LOB using rolling walker, c min assist x1, by 4 weeks.

## 2022-04-17 NOTE — PHYSICAL THERAPY INITIAL EVALUATION ADULT - PERTINENT HX OF CURRENT PROBLEM, REHAB EVAL
This is a 72 year old female with PMH of dementia, HTN, HLD, who presents to the ED today for AMS. History provided by daughter at bedside [phone]. She has been altered for 3 days and also reports urinary incontinence. no fevers, chilld or complains of pain, no apparent focal neuro deficits. In ED /128. labs only significant for na 129. CT head shows old meningioma slightly increased in size, no cva

## 2022-04-17 NOTE — ED ADULT NURSE REASSESSMENT NOTE - NS ED NURSE REASSESS COMMENT FT1
Daughter at bedside reporting hx of dementia with increase confusion. Pt noted ot be restless in bed. NAD noted. Pt straight cath and urine sent.

## 2022-04-17 NOTE — PROGRESS NOTE ADULT - SUBJECTIVE AND OBJECTIVE BOX
Resting in bed NAD. Afebrile Hemodynamically stable. No acute events. BP better controlled. doesnt follow commands but more awake and speaks.       Physical Exam: Constitutional: NAD AAO confused  HEENT PERRLA EOMI  CV RRR S1S2  Pulm CTA b/l   GI soft nontender nondistended + BS   Neuro CN II-XII grossly intact moves all extremities   Extremities no edema or calf tenderness

## 2022-04-17 NOTE — PHYSICAL THERAPY INITIAL EVALUATION ADULT - BALANCE TRAINING, PT EVAL
Pt will increase static/dynamic sitting balance to good and static/dynamic standing balance to good to perform all functional mobility without LOB, c min assist x1 , by 4weeks.

## 2022-04-18 LAB
A1C WITH ESTIMATED AVERAGE GLUCOSE RESULT: 9.8 % — HIGH (ref 4–5.6)
ANION GAP SERPL CALC-SCNC: 8 MMOL/L — SIGNIFICANT CHANGE UP (ref 5–17)
BUN SERPL-MCNC: 8 MG/DL — SIGNIFICANT CHANGE UP (ref 7–23)
CALCIUM SERPL-MCNC: 8.7 MG/DL — SIGNIFICANT CHANGE UP (ref 8.5–10.1)
CHLORIDE SERPL-SCNC: 103 MMOL/L — SIGNIFICANT CHANGE UP (ref 96–108)
CHOLEST SERPL-MCNC: 157 MG/DL — SIGNIFICANT CHANGE UP
CO2 SERPL-SCNC: 26 MMOL/L — SIGNIFICANT CHANGE UP (ref 22–31)
CREAT SERPL-MCNC: 0.8 MG/DL — SIGNIFICANT CHANGE UP (ref 0.5–1.3)
EGFR: 77 ML/MIN/1.73M2 — SIGNIFICANT CHANGE UP
ESTIMATED AVERAGE GLUCOSE: 235 MG/DL — HIGH (ref 68–114)
GLUCOSE BLDC GLUCOMTR-MCNC: 223 MG/DL — HIGH (ref 70–99)
GLUCOSE BLDC GLUCOMTR-MCNC: 238 MG/DL — HIGH (ref 70–99)
GLUCOSE BLDC GLUCOMTR-MCNC: 278 MG/DL — HIGH (ref 70–99)
GLUCOSE BLDC GLUCOMTR-MCNC: 328 MG/DL — HIGH (ref 70–99)
GLUCOSE BLDC GLUCOMTR-MCNC: 358 MG/DL — HIGH (ref 70–99)
GLUCOSE SERPL-MCNC: 229 MG/DL — HIGH (ref 70–99)
HCT VFR BLD CALC: 39 % — SIGNIFICANT CHANGE UP (ref 34.5–45)
HCV AB S/CO SERPL IA: 0.18 S/CO — SIGNIFICANT CHANGE UP (ref 0–0.99)
HCV AB SERPL-IMP: SIGNIFICANT CHANGE UP
HDLC SERPL-MCNC: 44 MG/DL — LOW
HGB BLD-MCNC: 13.2 G/DL — SIGNIFICANT CHANGE UP (ref 11.5–15.5)
LIPID PNL WITH DIRECT LDL SERPL: 90 MG/DL — SIGNIFICANT CHANGE UP
MCHC RBC-ENTMCNC: 27.4 PG — SIGNIFICANT CHANGE UP (ref 27–34)
MCHC RBC-ENTMCNC: 33.8 G/DL — SIGNIFICANT CHANGE UP (ref 32–36)
MCV RBC AUTO: 81.1 FL — SIGNIFICANT CHANGE UP (ref 80–100)
NON HDL CHOLESTEROL: 114 MG/DL — SIGNIFICANT CHANGE UP
NRBC # BLD: 0 /100 WBCS — SIGNIFICANT CHANGE UP (ref 0–0)
OSMOLALITY UR: 595 MOSM/KG — SIGNIFICANT CHANGE UP (ref 50–1200)
PLATELET # BLD AUTO: 300 K/UL — SIGNIFICANT CHANGE UP (ref 150–400)
POTASSIUM SERPL-MCNC: 3.6 MMOL/L — SIGNIFICANT CHANGE UP (ref 3.5–5.3)
POTASSIUM SERPL-SCNC: 3.6 MMOL/L — SIGNIFICANT CHANGE UP (ref 3.5–5.3)
RBC # BLD: 4.81 M/UL — SIGNIFICANT CHANGE UP (ref 3.8–5.2)
RBC # FLD: 12.7 % — SIGNIFICANT CHANGE UP (ref 10.3–14.5)
SODIUM SERPL-SCNC: 137 MMOL/L — SIGNIFICANT CHANGE UP (ref 135–145)
SODIUM UR-SCNC: 112 MMOL/L — SIGNIFICANT CHANGE UP
TRIGL SERPL-MCNC: 118 MG/DL — SIGNIFICANT CHANGE UP
TSH SERPL-MCNC: 4.24 UIU/ML — HIGH (ref 0.36–3.74)
WBC # BLD: 7.01 K/UL — SIGNIFICANT CHANGE UP (ref 3.8–10.5)
WBC # FLD AUTO: 7.01 K/UL — SIGNIFICANT CHANGE UP (ref 3.8–10.5)

## 2022-04-18 PROCEDURE — 99232 SBSQ HOSP IP/OBS MODERATE 35: CPT

## 2022-04-18 PROCEDURE — 70551 MRI BRAIN STEM W/O DYE: CPT | Mod: 26

## 2022-04-18 RX ORDER — INSULIN LISPRO 100/ML
VIAL (ML) SUBCUTANEOUS AT BEDTIME
Refills: 0 | Status: DISCONTINUED | OUTPATIENT
Start: 2022-04-18 | End: 2022-05-11

## 2022-04-18 RX ORDER — INSULIN GLARGINE 100 [IU]/ML
13 INJECTION, SOLUTION SUBCUTANEOUS AT BEDTIME
Refills: 0 | Status: DISCONTINUED | OUTPATIENT
Start: 2022-04-18 | End: 2022-04-19

## 2022-04-18 RX ORDER — DEXTROSE 50 % IN WATER 50 %
25 SYRINGE (ML) INTRAVENOUS ONCE
Refills: 0 | Status: DISCONTINUED | OUTPATIENT
Start: 2022-04-18 | End: 2022-05-11

## 2022-04-18 RX ORDER — DEXTROSE 50 % IN WATER 50 %
12.5 SYRINGE (ML) INTRAVENOUS ONCE
Refills: 0 | Status: DISCONTINUED | OUTPATIENT
Start: 2022-04-18 | End: 2022-05-11

## 2022-04-18 RX ORDER — DOCUSATE SODIUM 100 MG
1 CAPSULE ORAL
Qty: 0 | Refills: 0 | DISCHARGE

## 2022-04-18 RX ORDER — ROSUVASTATIN CALCIUM 5 MG/1
1 TABLET ORAL
Qty: 0 | Refills: 0 | DISCHARGE

## 2022-04-18 RX ORDER — DEXTROSE 50 % IN WATER 50 %
15 SYRINGE (ML) INTRAVENOUS ONCE
Refills: 0 | Status: DISCONTINUED | OUTPATIENT
Start: 2022-04-18 | End: 2022-05-11

## 2022-04-18 RX ORDER — GLUCAGON INJECTION, SOLUTION 0.5 MG/.1ML
1 INJECTION, SOLUTION SUBCUTANEOUS ONCE
Refills: 0 | Status: DISCONTINUED | OUTPATIENT
Start: 2022-04-18 | End: 2022-05-11

## 2022-04-18 RX ORDER — SODIUM CHLORIDE 9 MG/ML
1000 INJECTION, SOLUTION INTRAVENOUS
Refills: 0 | Status: DISCONTINUED | OUTPATIENT
Start: 2022-04-18 | End: 2022-05-11

## 2022-04-18 RX ORDER — INSULIN GLARGINE 100 [IU]/ML
100 INJECTION, SOLUTION SUBCUTANEOUS
Qty: 0 | Refills: 0 | DISCHARGE

## 2022-04-18 RX ORDER — MEMANTINE HYDROCHLORIDE 10 MG/1
1 TABLET ORAL
Qty: 0 | Refills: 0 | DISCHARGE

## 2022-04-18 RX ORDER — LINAGLIPTIN AND METFORMIN HYDROCHLORIDE 2.5; 85 MG/1; MG/1
1 TABLET, FILM COATED ORAL
Qty: 0 | Refills: 0 | DISCHARGE

## 2022-04-18 RX ORDER — INSULIN LISPRO 100/ML
4 VIAL (ML) SUBCUTANEOUS
Refills: 0 | Status: DISCONTINUED | OUTPATIENT
Start: 2022-04-18 | End: 2022-04-19

## 2022-04-18 RX ORDER — METOPROLOL TARTRATE 50 MG
0 TABLET ORAL
Qty: 0 | Refills: 0 | DISCHARGE

## 2022-04-18 RX ORDER — DONEPEZIL HYDROCHLORIDE 10 MG/1
1 TABLET, FILM COATED ORAL
Qty: 0 | Refills: 0 | DISCHARGE

## 2022-04-18 RX ORDER — METFORMIN HYDROCHLORIDE 850 MG/1
1 TABLET ORAL
Qty: 0 | Refills: 0 | DISCHARGE

## 2022-04-18 RX ORDER — LISINOPRIL 2.5 MG/1
1 TABLET ORAL
Qty: 0 | Refills: 0 | DISCHARGE

## 2022-04-18 RX ORDER — INSULIN LISPRO 100/ML
VIAL (ML) SUBCUTANEOUS
Refills: 0 | Status: DISCONTINUED | OUTPATIENT
Start: 2022-04-18 | End: 2022-05-11

## 2022-04-18 RX ORDER — HYDRALAZINE/HYDROCHLOROTHIAZID 50 MG-50MG
0 CAPSULE ORAL
Qty: 0 | Refills: 0 | DISCHARGE

## 2022-04-18 RX ORDER — GABAPENTIN 400 MG/1
1 CAPSULE ORAL
Qty: 0 | Refills: 0 | DISCHARGE

## 2022-04-18 RX ADMIN — INSULIN GLARGINE 13 UNIT(S): 100 INJECTION, SOLUTION SUBCUTANEOUS at 21:53

## 2022-04-18 RX ADMIN — DONEPEZIL HYDROCHLORIDE 10 MILLIGRAM(S): 10 TABLET, FILM COATED ORAL at 21:53

## 2022-04-18 RX ADMIN — Medication 25 MILLIGRAM(S): at 05:10

## 2022-04-18 RX ADMIN — GABAPENTIN 100 MILLIGRAM(S): 400 CAPSULE ORAL at 05:10

## 2022-04-18 RX ADMIN — Medication 4: at 11:32

## 2022-04-18 RX ADMIN — Medication 81 MILLIGRAM(S): at 11:47

## 2022-04-18 RX ADMIN — LISINOPRIL 40 MILLIGRAM(S): 2.5 TABLET ORAL at 05:10

## 2022-04-18 RX ADMIN — SODIUM CHLORIDE 125 MILLILITER(S): 9 INJECTION INTRAMUSCULAR; INTRAVENOUS; SUBCUTANEOUS at 05:10

## 2022-04-18 RX ADMIN — ATORVASTATIN CALCIUM 40 MILLIGRAM(S): 80 TABLET, FILM COATED ORAL at 21:52

## 2022-04-18 RX ADMIN — Medication 2: at 18:37

## 2022-04-18 RX ADMIN — Medication 2: at 07:56

## 2022-04-18 RX ADMIN — GABAPENTIN 100 MILLIGRAM(S): 400 CAPSULE ORAL at 13:49

## 2022-04-18 RX ADMIN — GABAPENTIN 100 MILLIGRAM(S): 400 CAPSULE ORAL at 21:52

## 2022-04-18 RX ADMIN — Medication 4 UNIT(S): at 16:36

## 2022-04-18 RX ADMIN — Medication 3: at 21:54

## 2022-04-18 RX ADMIN — ENOXAPARIN SODIUM 40 MILLIGRAM(S): 100 INJECTION SUBCUTANEOUS at 11:33

## 2022-04-18 NOTE — PHARMACOTHERAPY INTERVENTION NOTE - COMMENTS
Pharmacy: [facility]    Phone Number: [phone]  Information Source: Raji pharmacist     Trulicity 3 mg/0.5 ml, inject 3 mg weekly   Last Filled: 0408, picked up 28 days     Risperidone 0.5 mg, one tab by mouth twice daily   Last Filled: 04/04, picked up 30 days     Divalproex sodium  mg, one tab by mouth twice daily   Last Filled: 04/01, picked up 30 days     Metformin 1000 mg, one tab by mouth twice daily   Last Filled: 03/31, picked up 90 days     Basaglar Kwikpen, 32 units SQ at bedtime   Last Filled: 03/25, picked up 90 days     Hydralazine 10 mg, one tab by mouth twice daily   Last Filled: 03/25, picked up 90 days      Hydrochlorothiazide 25 mg, one tab by mouth once daily   Last Filled: 03/25, picked up 90 days     Lisinopril 40 mg, one tab by mouth once daily   Last Filled: 03/16, picked up 90 days     Donepezil 10 mg, two tabs by mouth once daily   Last Filled: 03/08, picked up 90 days     Memantine HCl 8 mg, one tab by mouth once daily   Last Filled: 02/17, picked up 14 days     Gabapentin 100 mg, one cap by mouth twice daily   Last Filled: 01/24, picked up 30 days     Atorvastatin 20 mg, one tab by mouth once daily   Last Filled: 01/24, picked up 90 days Pharmacy: [facility]    Phone Number: [phone]  Information Source: [facility] pharmacist     Trulicity 3 mg/0.5 ml, inject 3 mg weekly   Last Filled: 0408, picked up 28 days     Risperidone 0.5 mg, one tab by mouth twice daily   Last Filled: 04/04, picked up 30 days     Divalproex sodium  mg, one tab by mouth twice daily   Last Filled: 04/01, picked up 30 days     Metformin 1000 mg, one tab by mouth twice daily   Last Filled: 03/31, picked up 90 days     Basaglar Kwikpen, 32 units SQ at bedtime   Last Filled: 03/25, picked up 90 days     Hydralazine 10 mg, one tab by mouth twice daily   Last Filled: 03/25, picked up 90 days      Hydrochlorothiazide 25 mg, one tab by mouth once daily   Last Filled: 03/25, picked up 90 days     Lisinopril 40 mg, one tab by mouth once daily   Last Filled: 03/16, picked up 90 days     Donepezil 10 mg, two tabs by mouth once daily   Last Filled: 03/08, picked up 90 days     Memantine HCl 7 mg, one tab by mouth once daily   Last Filled: 02/17, picked up 14 days     Gabapentin 100 mg, one cap by mouth twice daily   Last Filled: 01/24, picked up 30 days     Atorvastatin 20 mg, one tab by mouth once daily   Last Filled: 01/24, picked up 90 days    *picked up metoprolol tartrate 50 mg BID in 10/2021

## 2022-04-18 NOTE — PROGRESS NOTE ADULT - ASSESSMENT
72 YOF PMHx dementia, HTN, presenting with worsened AMS.    Metabolic encephalopathy: ? stroke.  MRI brain today.      Hyponatremia, improved.  Stop IVF.  Accelerated HTN / HTN urgency, evaluate home BP regimen and consider up-titration if necessary  IDDMII, uncontrolled. A1c 9.8.  2-300s here.  Start basal bolus wt based at 0.4U/kg.  Evaluate home regimen as uncontrolled, there on  basaglar but diet may have changed   Dysphagia: diet puree, mod thick, speech eval.    vte prophy: lovenox  dispo PT recommending SNF

## 2022-04-18 NOTE — PROGRESS NOTE ADULT - SUBJECTIVE AND OBJECTIVE BOX
s. pt is unable to provide history, obtained from family at bedside.  They note she has had slowly progressive dementia at home, at baseline could ambulate to bathroom with assistance and engage in simple conversation.  Sudden change this week (~3-7 days ago?) where she no longer could get out of bed and was minimally communicative.  PCP recommended evaluation in the hospital subsequent to hearing about this.    o.  Vital Signs Last 24 Hrs  T(C): 36.8 (18 Apr 2022 04:44), Max: 36.8 (18 Apr 2022 04:44)  T(F): 98.2 (18 Apr 2022 04:44), Max: 98.2 (18 Apr 2022 04:44)  HR: 71 (18 Apr 2022 10:32) (70 - 88)  BP: 144/68 (18 Apr 2022 04:44) (144/68 - 165/93)  BP(mean): --  RR: 18 (18 Apr 2022 04:44) (18 - 18)  SpO2: 100% (18 Apr 2022 04:44) (95% - 100%)    gen nad  lungs clear  cv no LE edema, ext warm well perfused  abd soft nt  neuro CN II-XII grossly intact, responsive to stimuli but unable to complete full neuro exam due to her status.  There was no obvious focal weakness and was moving all 4 extremities  psyc aaox0-1, not answering questions appropriately per family    labs reviewed, sodium improved, A1C high

## 2022-04-19 LAB
AMMONIA BLD-MCNC: 11 UMOL/L — SIGNIFICANT CHANGE UP (ref 11–32)
ANION GAP SERPL CALC-SCNC: 7 MMOL/L — SIGNIFICANT CHANGE UP (ref 5–17)
BUN SERPL-MCNC: 9 MG/DL — SIGNIFICANT CHANGE UP (ref 7–23)
CALCIUM SERPL-MCNC: 9.1 MG/DL — SIGNIFICANT CHANGE UP (ref 8.5–10.1)
CHLORIDE SERPL-SCNC: 105 MMOL/L — SIGNIFICANT CHANGE UP (ref 96–108)
CO2 SERPL-SCNC: 25 MMOL/L — SIGNIFICANT CHANGE UP (ref 22–31)
CREAT SERPL-MCNC: 0.86 MG/DL — SIGNIFICANT CHANGE UP (ref 0.5–1.3)
EGFR: 71 ML/MIN/1.73M2 — SIGNIFICANT CHANGE UP
ERYTHROCYTE [SEDIMENTATION RATE] IN BLOOD: 13 MM/HR — SIGNIFICANT CHANGE UP (ref 0–20)
FLUAV AG NPH QL: SIGNIFICANT CHANGE UP
FLUBV AG NPH QL: SIGNIFICANT CHANGE UP
GLUCOSE BLDC GLUCOMTR-MCNC: 213 MG/DL — HIGH (ref 70–99)
GLUCOSE BLDC GLUCOMTR-MCNC: 233 MG/DL — HIGH (ref 70–99)
GLUCOSE BLDC GLUCOMTR-MCNC: 284 MG/DL — HIGH (ref 70–99)
GLUCOSE BLDC GLUCOMTR-MCNC: 303 MG/DL — HIGH (ref 70–99)
GLUCOSE SERPL-MCNC: 257 MG/DL — HIGH (ref 70–99)
POTASSIUM SERPL-MCNC: 3.7 MMOL/L — SIGNIFICANT CHANGE UP (ref 3.5–5.3)
POTASSIUM SERPL-SCNC: 3.7 MMOL/L — SIGNIFICANT CHANGE UP (ref 3.5–5.3)
SARS-COV-2 RNA SPEC QL NAA+PROBE: SIGNIFICANT CHANGE UP
SODIUM SERPL-SCNC: 137 MMOL/L — SIGNIFICANT CHANGE UP (ref 135–145)
TSH SERPL-MCNC: 2.55 UIU/ML — SIGNIFICANT CHANGE UP (ref 0.36–3.74)

## 2022-04-19 PROCEDURE — 99233 SBSQ HOSP IP/OBS HIGH 50: CPT

## 2022-04-19 PROCEDURE — 99221 1ST HOSP IP/OBS SF/LOW 40: CPT

## 2022-04-19 PROCEDURE — 93306 TTE W/DOPPLER COMPLETE: CPT | Mod: 26

## 2022-04-19 RX ORDER — INSULIN GLARGINE 100 [IU]/ML
25 INJECTION, SOLUTION SUBCUTANEOUS AT BEDTIME
Refills: 0 | Status: DISCONTINUED | OUTPATIENT
Start: 2022-04-19 | End: 2022-04-25

## 2022-04-19 RX ORDER — INSULIN LISPRO 100/ML
8 VIAL (ML) SUBCUTANEOUS
Refills: 0 | Status: DISCONTINUED | OUTPATIENT
Start: 2022-04-19 | End: 2022-05-09

## 2022-04-19 RX ORDER — LISINOPRIL 2.5 MG/1
20 TABLET ORAL DAILY
Refills: 0 | Status: DISCONTINUED | OUTPATIENT
Start: 2022-04-19 | End: 2022-05-11

## 2022-04-19 RX ADMIN — INSULIN GLARGINE 25 UNIT(S): 100 INJECTION, SOLUTION SUBCUTANEOUS at 21:35

## 2022-04-19 RX ADMIN — Medication 3: at 09:09

## 2022-04-19 RX ADMIN — GABAPENTIN 100 MILLIGRAM(S): 400 CAPSULE ORAL at 05:36

## 2022-04-19 RX ADMIN — Medication 4 UNIT(S): at 09:02

## 2022-04-19 RX ADMIN — Medication 2: at 17:49

## 2022-04-19 RX ADMIN — DONEPEZIL HYDROCHLORIDE 10 MILLIGRAM(S): 10 TABLET, FILM COATED ORAL at 21:32

## 2022-04-19 RX ADMIN — LISINOPRIL 20 MILLIGRAM(S): 2.5 TABLET ORAL at 17:52

## 2022-04-19 RX ADMIN — Medication 4: at 12:38

## 2022-04-19 RX ADMIN — GABAPENTIN 100 MILLIGRAM(S): 400 CAPSULE ORAL at 13:09

## 2022-04-19 RX ADMIN — Medication 81 MILLIGRAM(S): at 12:37

## 2022-04-19 RX ADMIN — ENOXAPARIN SODIUM 40 MILLIGRAM(S): 100 INJECTION SUBCUTANEOUS at 12:54

## 2022-04-19 RX ADMIN — GABAPENTIN 100 MILLIGRAM(S): 400 CAPSULE ORAL at 21:32

## 2022-04-19 RX ADMIN — MEMANTINE HYDROCHLORIDE 10 MILLIGRAM(S): 10 TABLET ORAL at 12:37

## 2022-04-19 RX ADMIN — Medication 4 UNIT(S): at 12:38

## 2022-04-19 RX ADMIN — Medication 8 UNIT(S): at 17:49

## 2022-04-19 RX ADMIN — ATORVASTATIN CALCIUM 40 MILLIGRAM(S): 80 TABLET, FILM COATED ORAL at 21:32

## 2022-04-19 NOTE — CONSULT NOTE ADULT - ASSESSMENT
To be completed.        RECOMMENDATIONS    B12, folate, methylmalonic acid, homocysteine, 25-OH vitamin D level, vitamin B1 level, ESR, CRP, LORIE. SPEP, repeat TSH, FT4, ammonia, syphilis serology, ferritin     To be completed.        RECOMMENDATIONS    B12, folate, methylmalonic acid, homocysteine, 25-OH vitamin D level, vitamin B1 level, ESR, CRP, LORIE. SPEP, repeat TSH, FT4, ammonia, syphilis serology, ferritin (ordered).    The assay for B12 does not measure B12 level directly.  False normal and false elevations (to or above the normal range) occur with pernicious anemia due to intrinsic factor antibodies, which may or may not be detected by antibody tests.  B12-like compounds of vegetable origin (a problem with vegans) without cobalamin activity also can lead to falsely normal or elevated determinations.  Methylmalonic acid (MMA) and homocysteine (Hcy) determinations are necessary to elucidate what is happening.    Irrespective of B12 and folate levels:       high Hcy w normal MMA implies folate deficiency;        high MMA and Hcy implies B12 deficiency +/- folate deficiency.     Benjamín Arevalo M.D.   - Department of Neurology  [place] [facility] [employer] [employer] [facility]   Slowly worsening dementia over several years.    Worsening cerebral atrophy with corresponding ventricular prominence on head CT imaging dating back to 2013.    Primary degenerative dementia.  R/O other co-existing causes for cognitive dcloine.         RECOMMENDATIONS    B12, folate, methylmalonic acid, homocysteine, 25-OH vitamin D level, vitamin B1 level, ESR, CRP, LORIE. SPEP, repeat TSH, FT4, ammonia, syphilis serology, ferritin (ordered).    The assay for B12 does not measure B12 level directly.  False normal and false elevations (to or above the normal range) occur with pernicious anemia due to intrinsic factor antibodies, which may or may not be detected by antibody tests.  B12-like compounds of vegetable origin (a problem with vegans) without cobalamin activity also can lead to falsely normal or elevated determinations.  Methylmalonic acid (MMA) and homocysteine (Hcy) determinations are necessary to elucidate what is happening.    Irrespective of B12 and folate levels:       high Hcy w normal MMA implies folate deficiency;        high MMA and Hcy implies B12 deficiency +/- folate deficiency.     Benjamín Arevalo M.D.   - Department of Neurology  [place] [facility] [employer] at [facility]

## 2022-04-19 NOTE — PROGRESS NOTE ADULT - ASSESSMENT
72 YOF PMHx dementia, HTN, IDDMII presenting with worsened AMS.    Metabolic encephalopathy: ? stroke.  Asked neuro to evaluate.  No signs of infectious cause.  A number of her medications could cause alterations in consciousness, although I do not believe any of them are new.    Hyponatremia, improved.  Stopped IVF, Na stable.  Likely related to HCTZ, which has been stopped.  Accelerated HTN / HTN urgency - start back on lisinopril 20mg daily (40 @ home), stop HCTZ, hold hydralazine  IDDMII, uncontrolled. A1c 9.8.  2-300s here.  Increased basal bolus dosing today, monitor for improvement  Dysphagia: diet puree, mod thick, speech eval.    vte prophy: lovenox  dispo PT recommending SNF, family agreeable

## 2022-04-19 NOTE — PROGRESS NOTE ADULT - SUBJECTIVE AND OBJECTIVE BOX
s. pt remains altered, unable to provide history.  daughter at bedside notes no improvement compared to yesteday.  They would like a trial of a rehab for the patient.    of note, roommate converted to COVID positive and pt now on droplet    o.  Vital Signs Last 24 Hrs  T(C): 36.8 (19 Apr 2022 16:06), Max: 37 (18 Apr 2022 23:35)  T(F): 98.2 (19 Apr 2022 16:06), Max: 98.6 (18 Apr 2022 23:35)  HR: 84 (19 Apr 2022 16:06) (59 - 98)  BP: 176/95 (19 Apr 2022 16:06) (148/77 - 187/77)  BP(mean): --  RR: 18 (19 Apr 2022 16:06) (18 - 18)  SpO2: 100% (19 Apr 2022 16:06) (96% - 100%)    gen comfortable in bed  lungs clear, good air movement  cv trace LE edema, rrr  abd soft  neuro spontaneously moves all 4 extremities.  speech limited.  no obvious focal CN deficit  psyc [id number], does not follow commands    labs reviewed, Na level improved.  no other abnormality.    mri brain reviewed motion degraded, no obvious stroke

## 2022-04-19 NOTE — CONSULT NOTE ADULT - SUBJECTIVE AND OBJECTIVE BOX
Pt was brought to the ED 4/16/22.  History from Admission H&P of 4/17/22.      <Start of quote(s) from H&P>  "History of Present Illness:   This is a 72 year old female with PMH of dementia, HTN, HLD, who presents to the ED today [Neurology Attending comment: Pt was brought in 4/16/22, the day BEFORE H&P was written] for AMS. History provided by daughter at bedside [phone]. She has been altered for 3 days and also reports urinary incontinence. no fevers, chilld or complains of pain, no apparent focal neuro deficits. In ED /128. labs only significant for na 129. CT head shows old meningioma slightly increased in size, no cva.    . . .   Home Medications:   * Patient Currently Takes Medications as of 03-Oct-2019 16:29 documented in Structured Notes  · 	lisinopril 40 mg oral tablet: 1 tab(s) orally once a day  · 	donepezil 10 mg oral tablet: 1 tab(s) orally once a day (at bedtime)  · 	gabapentin 100 mg oral capsule: 1 cap(s) orally 3 times a day  · 	memantine 14 mg oral capsule, extended release: 1 cap(s) orally once a day  · 	Jentadueto 2.5 mg-1000 mg oral tablet: 1 tab(s) orally 2 times a day  · 	rosuvastatin 10 mg oral tablet: 1 tab(s) orally once a day  · 	metoprolol tartrate 50 mg oral tablet:   · 	hydralazine-hydrochlorothiazide 25 mg-25 mg oral capsule:   · 	Doc-Q-Lace 100 mg oral capsule: 1 cap(s) orally 2 times a day  · 	Basaglar KwikPen 100 units/mL subcutaneous solution: 100 unit(s) subcutaneous   . . .   Denies EtOH drug or tobacco use   . . .      Heart Failure:  Does this patient have a history of or has been diagnosed with heart failure? no."  <End of quote(s) from H&P>      Per radiology report of non-con head CT 4/16/22:  "COMPARISON: CT head 10/3/2019.    FINDINGS:  No acute transcortical infarct or intracranial hemorrhage. White matter   hypoattenuating foci are noted, compatible with chronic small vessel   disease.    1.4 cm partially calcified extra-axial lesion over the leftmedial   frontal convexity compatible with meningioma, previously 1.1 cm.    No hydrocephalus. No extra-axial fluid collections.    Bilateral cataract surgery. Orbits are otherwise unremarkable. Scattered   mucosal thickening throughout the paranasalsinuses. The mastoid air   cells are clear. The visualized soft tissues and osseous structures   appear normal.    IMPRESSION:    -No acute transcortical infarct or intracranial hemorrhage. White matter   small vessel disease.    -Mild increase in size of left high frontal convexity meningioma, now   measuring 1.4 cm."      Per radiology report of non-con MR brain 4/18/22:   "COMPARISON: CT head 4/16/2022    FINDINGS:  Motion degraded study.  The ventricles and sulci are age appropriate . There are moderate patchy   areas of T2  hyperintensity within the periventricular and subcortical   white matter which are non specific and may be related to chronic   microvascular ischemic changes. Known left frontal parasagittal   meningioma is not definitively seen on this study secondary to motion   artifact. There is no evidence of intracranial hemorrhage, or acute   infarction. There is no extra axial collection. No midline shift or other   significant mass effect is noted.     The paranasal sinuses are predominantly clear.. The mastoid air cells   are predominantly clear.    IMPRESSION:  Significantly motion degraded study.  There is no definite intracranial hemorrhage, or acute infarction."      On Review of lab tests I note in particular:       Pt was brought to the ED 4/16/22.  History from Admission H&P of 4/17/22.      <Start of quote(s) from H&P>  "History of Present Illness:   This is a 72 year old female with PMH of dementia, HTN, HLD, who presents to the ED today [Neurology Attending comment: Pt was brought in 4/16/22, the day BEFORE H&P was written] for AMS. History provided by daughter at bedside [phone]. She has been altered for 3 days and also reports urinary incontinence. no fevers, chilld or complains of pain, no apparent focal neuro deficits. In ED /128. labs only significant for na 129. CT head shows old meningioma slightly increased in size, no cva.    . . .   Home Medications:   * Patient Currently Takes Medications as of 03-Oct-2019 16:29 documented in Structured Notes  · 	lisinopril 40 mg oral tablet: 1 tab(s) orally once a day  · 	donepezil 10 mg oral tablet: 1 tab(s) orally once a day (at bedtime)  · 	gabapentin 100 mg oral capsule: 1 cap(s) orally 3 times a day  · 	memantine 14 mg oral capsule, extended release: 1 cap(s) orally once a day  · 	Jentadueto 2.5 mg-1000 mg oral tablet: 1 tab(s) orally 2 times a day  · 	rosuvastatin 10 mg oral tablet: 1 tab(s) orally once a day  · 	metoprolol tartrate 50 mg oral tablet:   · 	hydralazine-hydrochlorothiazide 25 mg-25 mg oral capsule:   · 	Doc-Q-Lace 100 mg oral capsule: 1 cap(s) orally 2 times a day  · 	Basaglar KwikPen 100 units/mL subcutaneous solution: 100 unit(s) subcutaneous   . . .   Denies EtOH drug or tobacco use   . . .      Heart Failure:  Does this patient have a history of or has been diagnosed with heart failure? no."  <End of quote(s) from H&P>      Per radiology report of non-con head CT 4/16/22:  "COMPARISON: CT head 10/3/2019.    FINDINGS:  No acute transcortical infarct or intracranial hemorrhage. White matter   hypoattenuating foci are noted, compatible with chronic small vessel   disease.    1.4 cm partially calcified extra-axial lesion over the leftmedial   frontal convexity compatible with meningioma, previously 1.1 cm.    No hydrocephalus. No extra-axial fluid collections.    Bilateral cataract surgery. Orbits are otherwise unremarkable. Scattered   mucosal thickening throughout the paranasalsinuses. The mastoid air   cells are clear. The visualized soft tissues and osseous structures   appear normal.    IMPRESSION:    -No acute transcortical infarct or intracranial hemorrhage. White matter   small vessel disease.    -Mild increase in size of left high frontal convexity meningioma, now   measuring 1.4 cm."      To my eye there has been ongoing slow worsening of the degree of cortical atrophy and corresponding ventricular prominence on head CTs from 2/16/18 to 10/3/19, and from 10/3/19 to 4/16/22.  The most notable worsening was from 8/9/13 to 2/16/18.        Per radiology report of non-con MR brain 4/18/22:   "COMPARISON: CT head 4/16/2022    FINDINGS:  Motion degraded study.  The ventricles and sulci are age appropriate . There are moderate patchy   areas of T2  hyperintensity within the periventricular and subcortical   white matter which are non specific and may be related to chronic   microvascular ischemic changes. Known left frontal parasagittal   meningioma is not definitively seen on this study secondary to motion   artifact. There is no evidence of intracranial hemorrhage, or acute   infarction. There is no extra axial collection. No midline shift or other   significant mass effect is noted.     The paranasal sinuses are predominantly clear.. The mastoid air cells   are predominantly clear.    IMPRESSION:  Significantly motion degraded study.  There is no definite intracranial hemorrhage, or acute infarction."      On Review of lab tests I note in particular:  TSH  4.24.    Na+  129  4/16/22; 137 on 4/18/22 and today.        EXAMINATION    To be completed       Pt was brought to the ED 4/16/22.  History from Admission H&P of 4/17/22.      <Start of quote(s) from H&P>  "History of Present Illness:   This is a 72 year old female with PMH of dementia, HTN, HLD, who presents to the ED today [Neurology Attending comment: Pt was brought in 4/16/22, the day BEFORE H&P was written] for AMS. History provided by daughter at bedside [phone]. She has been altered for 3 days and also reports urinary incontinence. no fevers, chilld or complains of pain, no apparent focal neuro deficits. In ED /128. labs only significant for na 129. CT head shows old meningioma slightly increased in size, no cva.    . . .   Home Medications:   * Patient Currently Takes Medications as of 03-Oct-2019 16:29 documented in Structured Notes  · 	lisinopril 40 mg oral tablet: 1 tab(s) orally once a day  · 	donepezil 10 mg oral tablet: 1 tab(s) orally once a day (at bedtime)  · 	gabapentin 100 mg oral capsule: 1 cap(s) orally 3 times a day  · 	memantine 14 mg oral capsule, extended release: 1 cap(s) orally once a day  · 	Jentadueto 2.5 mg-1000 mg oral tablet: 1 tab(s) orally 2 times a day  · 	rosuvastatin 10 mg oral tablet: 1 tab(s) orally once a day  · 	metoprolol tartrate 50 mg oral tablet:   · 	hydralazine-hydrochlorothiazide 25 mg-25 mg oral capsule:   · 	Doc-Q-Lace 100 mg oral capsule: 1 cap(s) orally 2 times a day  · 	Basaglar KwikPen 100 units/mL subcutaneous solution: 100 unit(s) subcutaneous   . . .   Denies EtOH drug or tobacco use   . . .      Heart Failure:  Does this patient have a history of or has been diagnosed with heart failure? no."  <End of quote(s) from H&P>      Per radiology report of non-con head CT 4/16/22:  "COMPARISON: CT head 10/3/2019.    FINDINGS:  No acute transcortical infarct or intracranial hemorrhage. White matter   hypoattenuating foci are noted, compatible with chronic small vessel   disease.    1.4 cm partially calcified extra-axial lesion over the leftmedial   frontal convexity compatible with meningioma, previously 1.1 cm.    No hydrocephalus. No extra-axial fluid collections.    Bilateral cataract surgery. Orbits are otherwise unremarkable. Scattered   mucosal thickening throughout the paranasalsinuses. The mastoid air   cells are clear. The visualized soft tissues and osseous structures   appear normal.    IMPRESSION:    -No acute transcortical infarct or intracranial hemorrhage. White matter   small vessel disease.    -Mild increase in size of left high frontal convexity meningioma, now   measuring 1.4 cm."      To my eye there has been ongoing slow worsening of the degree of cortical atrophy and corresponding ventricular prominence on head CTs from 2/16/18 to 10/3/19, and from 10/3/19 to 4/16/22.  The most notable worsening was from 8/9/13 to 2/16/18.        Per radiology report of non-con MR brain 4/18/22:   "COMPARISON: CT head 4/16/2022    FINDINGS:  Motion degraded study.  The ventricles and sulci are age appropriate . There are moderate patchy   areas of T2  hyperintensity within the periventricular and subcortical   white matter which are non specific and may be related to chronic   microvascular ischemic changes. Known left frontal parasagittal   meningioma is not definitively seen on this study secondary to motion   artifact. There is no evidence of intracranial hemorrhage, or acute   infarction. There is no extra axial collection. No midline shift or other   significant mass effect is noted.     The paranasal sinuses are predominantly clear.. The mastoid air cells   are predominantly clear.    IMPRESSION:  Significantly motion degraded study.  There is no definite intracranial hemorrhage, or acute infarction."      On Review of lab tests I note in particular:  TSH  4.24.    Na+  129  4/16/22; 137 on 4/18/22 and today.        EXAMINATION    Awake, alert.  Medium to medium thin build.  Does not follow visual cues.  Mumbling softly at times.  PERRL. Looks L and R grossly conjugately.  Cannot assess visual fields.  Grossly symmetric normal facial movements.  Moves all four limbs spontaneously in bed; no obvious asymmetry or gross focal deficits.  Winces to irritating stimulatin of any limb.  Intermittently present low frequency distal tremors of UEs> LEs during voluntary movement.      Reflex                           Right    Left   Comment    Biceps                              0?      2-  Triceps                             0        0   Patellar                             3       3-  Gastroc                            0       0  Plantar                         flexor    ?    Non-extinguishing glabellar reflex.  No snout reflex.  R palmo-mental reflex.  Moderately increased tonus at R > L elbow.

## 2022-04-20 LAB
24R-OH-CALCIDIOL SERPL-MCNC: 30.4 NG/ML — SIGNIFICANT CHANGE UP (ref 30–80)
ANION GAP SERPL CALC-SCNC: 7 MMOL/L — SIGNIFICANT CHANGE UP (ref 5–17)
BUN SERPL-MCNC: 11 MG/DL — SIGNIFICANT CHANGE UP (ref 7–23)
CALCIUM SERPL-MCNC: 9 MG/DL — SIGNIFICANT CHANGE UP (ref 8.5–10.1)
CHLORIDE SERPL-SCNC: 107 MMOL/L — SIGNIFICANT CHANGE UP (ref 96–108)
CO2 SERPL-SCNC: 25 MMOL/L — SIGNIFICANT CHANGE UP (ref 22–31)
CREAT SERPL-MCNC: 0.8 MG/DL — SIGNIFICANT CHANGE UP (ref 0.5–1.3)
CRP SERPL-MCNC: 4 MG/L — SIGNIFICANT CHANGE UP
EGFR: 77 ML/MIN/1.73M2 — SIGNIFICANT CHANGE UP
FERRITIN SERPL-MCNC: 56 NG/ML — SIGNIFICANT CHANGE UP (ref 15–150)
FOLATE SERPL-MCNC: 5.9 NG/ML — SIGNIFICANT CHANGE UP
GLUCOSE BLDC GLUCOMTR-MCNC: 136 MG/DL — HIGH (ref 70–99)
GLUCOSE BLDC GLUCOMTR-MCNC: 158 MG/DL — HIGH (ref 70–99)
GLUCOSE BLDC GLUCOMTR-MCNC: 179 MG/DL — HIGH (ref 70–99)
GLUCOSE BLDC GLUCOMTR-MCNC: 231 MG/DL — HIGH (ref 70–99)
GLUCOSE BLDC GLUCOMTR-MCNC: 283 MG/DL — HIGH (ref 70–99)
GLUCOSE SERPL-MCNC: 176 MG/DL — HIGH (ref 70–99)
POTASSIUM SERPL-MCNC: 3.6 MMOL/L — SIGNIFICANT CHANGE UP (ref 3.5–5.3)
POTASSIUM SERPL-SCNC: 3.6 MMOL/L — SIGNIFICANT CHANGE UP (ref 3.5–5.3)
SODIUM SERPL-SCNC: 139 MMOL/L — SIGNIFICANT CHANGE UP (ref 135–145)
T PALLIDUM AB TITR SER: NEGATIVE — SIGNIFICANT CHANGE UP
VIT B12 SERPL-MCNC: 476 PG/ML — SIGNIFICANT CHANGE UP (ref 232–1245)

## 2022-04-20 PROCEDURE — 99233 SBSQ HOSP IP/OBS HIGH 50: CPT

## 2022-04-20 PROCEDURE — 99232 SBSQ HOSP IP/OBS MODERATE 35: CPT

## 2022-04-20 RX ORDER — LISINOPRIL 2.5 MG/1
20 TABLET ORAL ONCE
Refills: 0 | Status: DISCONTINUED | OUTPATIENT
Start: 2022-04-20 | End: 2022-04-20

## 2022-04-20 RX ADMIN — Medication 2: at 11:59

## 2022-04-20 RX ADMIN — MEMANTINE HYDROCHLORIDE 10 MILLIGRAM(S): 10 TABLET ORAL at 12:07

## 2022-04-20 RX ADMIN — Medication 1: at 21:40

## 2022-04-20 RX ADMIN — DONEPEZIL HYDROCHLORIDE 10 MILLIGRAM(S): 10 TABLET, FILM COATED ORAL at 21:43

## 2022-04-20 RX ADMIN — ENOXAPARIN SODIUM 40 MILLIGRAM(S): 100 INJECTION SUBCUTANEOUS at 11:55

## 2022-04-20 RX ADMIN — Medication 8 UNIT(S): at 17:38

## 2022-04-20 RX ADMIN — Medication 8 UNIT(S): at 12:07

## 2022-04-20 RX ADMIN — GABAPENTIN 100 MILLIGRAM(S): 400 CAPSULE ORAL at 05:49

## 2022-04-20 RX ADMIN — INSULIN GLARGINE 25 UNIT(S): 100 INJECTION, SOLUTION SUBCUTANEOUS at 21:39

## 2022-04-20 RX ADMIN — GABAPENTIN 100 MILLIGRAM(S): 400 CAPSULE ORAL at 13:11

## 2022-04-20 RX ADMIN — GABAPENTIN 100 MILLIGRAM(S): 400 CAPSULE ORAL at 21:42

## 2022-04-20 RX ADMIN — Medication 81 MILLIGRAM(S): at 12:07

## 2022-04-20 RX ADMIN — LISINOPRIL 20 MILLIGRAM(S): 2.5 TABLET ORAL at 05:49

## 2022-04-20 RX ADMIN — Medication 8 UNIT(S): at 08:35

## 2022-04-20 RX ADMIN — Medication 1: at 08:35

## 2022-04-20 RX ADMIN — ATORVASTATIN CALCIUM 40 MILLIGRAM(S): 80 TABLET, FILM COATED ORAL at 21:42

## 2022-04-20 NOTE — PROGRESS NOTE ADULT - SUBJECTIVE AND OBJECTIVE BOX
This is in follow-up to the initial Neurology Consult Note of yesterday 4/19/22.      History as previously noted.  In addition, per interviewing Pt's daughter at bedside:      Pt has been slowly declining cognitively  for at least 5 to 7 years.  She was walking on her own until last summer, when she first began to need assistance to get around.  Hand tremors first noted about 2 to 3 weeks ago.  About two weeks Pt's  had to assist her  to go to and from the bathroom.  In the past week or so she has fallen thrice, rolling out of bed twice.  She used to be able to say she had to pee or poop.  Now she defecates and urinates in bed.  She no longer speaks, only babbles nonsense.  Does not follow verbal requests.       Pt's decline has continued while being on donepezil and memantine.  Daughter thinks they have had no benefit.  Apparently was told by neurologist no benefit would be expected at a certain stage.         Results of lab tests ordered yesterday:    B12/folate  476/5.9   methylmalonic acid/homocysteine  pending   25-OH vitamin D level  30.4   vitamin B1 level  pending   ESR/CRP  13/4   LORIE  pending   SPEP  pending   repeat TSH  2.55   FT4  1.4  ammonia  11   syphilis serology  neg    ferritin  56.      Examination as noted in the initial consult note.  Daughter confirms that her mother is not producing any intelligible speech and is not following requests in native language.      PT evaluation noted.

## 2022-04-20 NOTE — PROGRESS NOTE ADULT - ASSESSMENT
Primary Degenerative Dementia.  Now bedridden, non-verbal and incontinent.  Loss of speech very late in clinical course.  No behavioral problems.  No overt extrapyramidal features.      Clinical course consistent with Alzheimer's disease more than with Fronto-Temporal dementia, Primary Progressive Aphasia, or Multiple System Atrophy.        RECOMMENDATIONS    PROM exercises to prevent contractures.    Therapy per PT to maximize mobility (with required assistance).    Follow-up on remaining test results.       Primary Degenerative Dementia.  Now bedridden, non-verbal and incontinent.  Loss of speech very late in clinical course.  No behavioral problems.  No overt extrapyramidal features.      Clinical course consistent with Alzheimer's disease more than with Fronto-Temporal dementia, Primary Progressive Aphasia, or Multiple System Atrophy.        RECOMMENDATIONS    PROM exercises to prevent contractures.    Mobilization program per PT recommendations.      Follow-up on remaining test results.

## 2022-04-20 NOTE — DIETITIAN INITIAL EVALUATION ADULT - ORAL INTAKE PTA/DIET HISTORY
Pt is confused, Caro speaking, pt's daughter German was @ bedside.  Pt c good appetite PTA, but needed to be fed.   Diet PTA: per daughter, diabetic, pt's  at times gives pt some sugar in tea for pt.  Pt ate  food, does not eat beef or pork, tolerated regular consistency of food and liquids, without chewing/swallowing difficulty PTA, ate normal servings of food PTA.   Pt lived c spouse & son PTA.

## 2022-04-20 NOTE — PROGRESS NOTE ADULT - ASSESSMENT
72 YOF PMHx dementia, HTN, IDDMII presenting with worsened AMS.    Metabolic encephalopathy: ? stroke.  Asked neuro to evaluate.  No signs of infectious cause.  A number of her medications could cause alterations in consciousness, although I do not believe any of them are new.    Hyponatremia, improved.  Stopped IVF, Na stable.  Likely related to HCTZ, which has been stopped.  Accelerated HTN / HTN urgency - start back on lisinopril 20mg daily (40 @ home), stop HCTZ, hold hydralazine  IDDMII, uncontrolled. A1c 9.8.  2-300s here.  Increased basal bolus dosing today, monitor for improvement  Dysphagia: diet puree, mod thick, speech eval.    vte prophy: lovenox  dispo PT recommending SNF, family agreeable 72 YOF PMHx dementia, HTN, IDDMII presenting with worsened AMS.    Metabolic encephalopathy due to polypharmacy likely chronic stae  Asked neuro to evaluate.  No signs of infectious cause.  A number of her medications could cause alterations in consciousness, although I do not believe any of them are new.    Hyponatremia, related to HCTZ  resolved      HTN / HTN urgency   lisinopril 20mg daily (40 @ home), stop HCTZ, hold hydralazine    IDDM type II      Dysphagia: diet puree, mod thick, speech eval.    vte prophy: lovenox    dispo PT recommending SNF, family agreeable. SW on case looking for facilities

## 2022-04-20 NOTE — PROGRESS NOTE ADULT - SUBJECTIVE AND OBJECTIVE BOX
Review of Systems:  General:denies fever chills, headache, weakness  HEENT: denies blurry vision,diffculty swallowing, difficulty hearing, tinnitus  Cardiovascular: denies chest pain  ,palpitations  Pulmonary:denies shortness of breath, cough, wheezing, hemoptysis  Gastrointestinal: denies abdominal pain, constipation, diarrhea,nausea , vomiting, hematochezia  : denies hematuria, dysuria, or incontinence  Neurological: denies weakness, numbness , tingling, dizziness, tremors  MSK: denies muscle pain, difficulty ambulating, swelling, back pain  skin: denies skin rash, itching, burning, or  skin lesions  Psychiatrical: denies mood disturbances, anxierty, feeling depressed, depression , or difficulty sleeping    Objective:  Vitals  T(C): 37.1 (04-20-22 @ 11:28), Max: 37.1 (04-20-22 @ 11:28)  HR: 83 (04-20-22 @ 11:28) (72 - 84)  BP: 167/93 (04-20-22 @ 11:28) (154/82 - 176/95)  RR: 18 (04-20-22 @ 11:28) (18 - 18)  SpO2: 100% (04-20-22 @ 11:28) (100% - 100%)    Physical Exam:  General: comfortable, no acute distress, well nourished  HEENT: Atraumatic, no LAD, trachea midline, PERRLA  Cardiovascular: normal s1s2, no murmurs, gallops or fricition rubs  Pulmonary: clear to ausculation Bilaterally, no wheezing , rhonchi  Gastrointestinal: soft non tender non distended, no masses felt, no organomegally  Muscloskeletal: no lower extremity edema, intact bilateral lower extremity pulses  Neurological: CN II-12 intact. No focal weakness  Psychiatrical: normal mood, cooperative  SKIN: no rash, lesions or ulcers    Labs:      04-20    139  |  107  |  11  ----------------------------<  176<H>  3.6   |  25  |  0.80    Ca    9.0      20 Apr 2022 08:05              Active Medications  MEDICATIONS  (STANDING):  aspirin  chewable 81 milliGRAM(s) Oral daily  atorvastatin 40 milliGRAM(s) Oral at bedtime  dextrose 5%. 1000 milliLiter(s) (50 mL/Hr) IV Continuous <Continuous>  dextrose 5%. 1000 milliLiter(s) (100 mL/Hr) IV Continuous <Continuous>  dextrose 50% Injectable 25 Gram(s) IV Push once  dextrose 50% Injectable 12.5 Gram(s) IV Push once  dextrose 50% Injectable 25 Gram(s) IV Push once  donepezil 10 milliGRAM(s) Oral at bedtime  enoxaparin Injectable 40 milliGRAM(s) SubCutaneous every 24 hours  gabapentin 100 milliGRAM(s) Oral three times a day  glucagon  Injectable 1 milliGRAM(s) IntraMuscular once  insulin glargine Injectable (LANTUS) 25 Unit(s) SubCutaneous at bedtime  insulin lispro (ADMELOG) corrective regimen sliding scale   SubCutaneous three times a day before meals  insulin lispro (ADMELOG) corrective regimen sliding scale   SubCutaneous at bedtime  insulin lispro Injectable (ADMELOG) 8 Unit(s) SubCutaneous three times a day before meals  lisinopril 20 milliGRAM(s) Oral daily  memantine 10 milliGRAM(s) Oral daily    MEDICATIONS  (PRN):  acetaminophen     Tablet .. 650 milliGRAM(s) Oral every 6 hours PRN Temp greater or equal to 38C (100.4F), Mild Pain (1 - 3)  aluminum hydroxide/magnesium hydroxide/simethicone Suspension 30 milliLiter(s) Oral every 4 hours PRN Dyspepsia  dextrose Oral Gel 15 Gram(s) Oral once PRN Blood Glucose LESS THAN 70 milliGRAM(s)/deciliter  melatonin 3 milliGRAM(s) Oral at bedtime PRN Insomnia  ondansetron Injectable 4 milliGRAM(s) IV Push every 8 hours PRN Nausea and/or Vomiting     Patient seen and examined at bedside  daughter at bedside. used for [facility] interpretation    Review of Systems:  unable to obtain due to poor mental status    Objective:  Vitals  T(C): 37.1 (04-20-22 @ 11:28), Max: 37.1 (04-20-22 @ 11:28)  HR: 83 (04-20-22 @ 11:28) (72 - 84)  BP: 167/93 (04-20-22 @ 11:28) (154/82 - 176/95)  RR: 18 (04-20-22 @ 11:28) (18 - 18)  SpO2: 100% (04-20-22 @ 11:28) (100% - 100%)    Physical Exam:  General: comfortable, no acute distress, well nourished  HEENT: Atraumatic, no LAD, trachea midline, PERRLA  Cardiovascular: normal s1s2, no murmurs, gallops or fricition rubs  Pulmonary: clear to ausculation Bilaterally, no wheezing , rhonchi  Gastrointestinal: soft non tender non distended, no masses felt, no organomegally  Muscloskeletal: no lower extremity edema, intact bilateral lower extremity pulses  Neurological: CN II-12 intact. No focal weakness. confused. alert x 1.  Psychiatrical: normal mood, cooperative  SKIN: no rash, lesions or ulcers    Labs:      04-20    139  |  107  |  11  ----------------------------<  176<H>  3.6   |  25  |  0.80    Ca    9.0      20 Apr 2022 08:05              Active Medications  MEDICATIONS  (STANDING):  aspirin  chewable 81 milliGRAM(s) Oral daily  atorvastatin 40 milliGRAM(s) Oral at bedtime  dextrose 5%. 1000 milliLiter(s) (50 mL/Hr) IV Continuous <Continuous>  dextrose 5%. 1000 milliLiter(s) (100 mL/Hr) IV Continuous <Continuous>  dextrose 50% Injectable 25 Gram(s) IV Push once  dextrose 50% Injectable 12.5 Gram(s) IV Push once  dextrose 50% Injectable 25 Gram(s) IV Push once  donepezil 10 milliGRAM(s) Oral at bedtime  enoxaparin Injectable 40 milliGRAM(s) SubCutaneous every 24 hours  gabapentin 100 milliGRAM(s) Oral three times a day  glucagon  Injectable 1 milliGRAM(s) IntraMuscular once  insulin glargine Injectable (LANTUS) 25 Unit(s) SubCutaneous at bedtime  insulin lispro (ADMELOG) corrective regimen sliding scale   SubCutaneous three times a day before meals  insulin lispro (ADMELOG) corrective regimen sliding scale   SubCutaneous at bedtime  insulin lispro Injectable (ADMELOG) 8 Unit(s) SubCutaneous three times a day before meals  lisinopril 20 milliGRAM(s) Oral daily  memantine 10 milliGRAM(s) Oral daily    MEDICATIONS  (PRN):  acetaminophen     Tablet .. 650 milliGRAM(s) Oral every 6 hours PRN Temp greater or equal to 38C (100.4F), Mild Pain (1 - 3)  aluminum hydroxide/magnesium hydroxide/simethicone Suspension 30 milliLiter(s) Oral every 4 hours PRN Dyspepsia  dextrose Oral Gel 15 Gram(s) Oral once PRN Blood Glucose LESS THAN 70 milliGRAM(s)/deciliter  melatonin 3 milliGRAM(s) Oral at bedtime PRN Insomnia  ondansetron Injectable 4 milliGRAM(s) IV Push every 8 hours PRN Nausea and/or Vomiting

## 2022-04-20 NOTE — DIETITIAN INITIAL EVALUATION ADULT - PHYSCIAL ASSESSMENT
Pt's daughter is unsure of usual wt. of pt., but stated that pt does not appear to have lost weight PTA/debilitated

## 2022-04-20 NOTE — DIETITIAN INITIAL EVALUATION ADULT - PERTINENT MEDS FT
MEDICATIONS  (STANDING):  aspirin  chewable 81 milliGRAM(s) Oral daily  atorvastatin 40 milliGRAM(s) Oral at bedtime  dextrose 5%. 1000 milliLiter(s) (50 mL/Hr) IV Continuous <Continuous>  dextrose 5%. 1000 milliLiter(s) (100 mL/Hr) IV Continuous <Continuous>  dextrose 50% Injectable 25 Gram(s) IV Push once  dextrose 50% Injectable 12.5 Gram(s) IV Push once  dextrose 50% Injectable 25 Gram(s) IV Push once  donepezil 10 milliGRAM(s) Oral at bedtime  enoxaparin Injectable 40 milliGRAM(s) SubCutaneous every 24 hours  gabapentin 100 milliGRAM(s) Oral three times a day  glucagon  Injectable 1 milliGRAM(s) IntraMuscular once  insulin glargine Injectable (LANTUS) 25 Unit(s) SubCutaneous at bedtime  insulin lispro (ADMELOG) corrective regimen sliding scale   SubCutaneous three times a day before meals  insulin lispro (ADMELOG) corrective regimen sliding scale   SubCutaneous at bedtime  insulin lispro Injectable (ADMELOG) 8 Unit(s) SubCutaneous three times a day before meals  lisinopril 20 milliGRAM(s) Oral daily  memantine 10 milliGRAM(s) Oral daily    MEDICATIONS  (PRN):  acetaminophen     Tablet .. 650 milliGRAM(s) Oral every 6 hours PRN Temp greater or equal to 38C (100.4F), Mild Pain (1 - 3)  aluminum hydroxide/magnesium hydroxide/simethicone Suspension 30 milliLiter(s) Oral every 4 hours PRN Dyspepsia  dextrose Oral Gel 15 Gram(s) Oral once PRN Blood Glucose LESS THAN 70 milliGRAM(s)/deciliter  melatonin 3 milliGRAM(s) Oral at bedtime PRN Insomnia  ondansetron Injectable 4 milliGRAM(s) IV Push every 8 hours PRN Nausea and/or Vomiting

## 2022-04-20 NOTE — DIETITIAN INITIAL EVALUATION ADULT - NS FNS DIET ORDER
Diet, Regular:   Consistent Carbohydrate {Evening Snack}  Pureed (PUREED)  Moderately Thick Liquids (MODTHICKLIQS) (04-17-22 @ 06:20)

## 2022-04-20 NOTE — DIETITIAN INITIAL EVALUATION ADULT - OTHER INFO
Pt was on diabetic pill, insulin, injection 1 x/week as per daughter who is unsure of name of medicine.  As per outpatient home meds, pt was on metformin, Trulicity, Basaglar insulin 32 units.  Pt's spouse monitored glucose for pt. when possible, lately has been difficult due to AMS of pt as per daughter.  RD provided education for consistent CHO intake c meal planning c plate method, older adult glucose goal of 100-200 mg/dL.

## 2022-04-20 NOTE — DIETITIAN INITIAL EVALUATION ADULT - PERTINENT LABORATORY DATA
04-20    139  |  107  |  11  ----------------------------<  176<H>  3.6   |  25  |  0.80    Ca    9.0      20 Apr 2022 08:05    POCT Blood Glucose.: 231 mg/dL (04-20-22 @ 11:16)  A1C with Estimated Average Glucose Result: 9.8 % /estimated average Glucose 235 mg/dL<H> (04-18-22 @ 08:43)

## 2022-04-20 NOTE — DIETITIAN NUTRITION RISK NOTIFICATION - TREATMENT: THE FOLLOWING DIET HAS BEEN RECOMMENDED
Diet, Regular:   Consistent Carbohydrate {Evening Snack}  Pureed (PUREED)  Moderately Thick Liquids (MODTHICKLIQS) (04-17-22 @ 06:20) [Active]

## 2022-04-21 LAB
% ALBUMIN: 53.5 % — SIGNIFICANT CHANGE UP
% ALPHA 1: 4.2 % — SIGNIFICANT CHANGE UP
% ALPHA 2: 11.6 % — SIGNIFICANT CHANGE UP
% BETA: 14.1 % — SIGNIFICANT CHANGE UP
% GAMMA: 16.6 % — SIGNIFICANT CHANGE UP
ALBUMIN SERPL ELPH-MCNC: 3.7 G/DL — SIGNIFICANT CHANGE UP (ref 3.6–5.5)
ALBUMIN/GLOB SERPL ELPH: 1.2 RATIO — SIGNIFICANT CHANGE UP
ALPHA1 GLOB SERPL ELPH-MCNC: 0.3 G/DL — SIGNIFICANT CHANGE UP (ref 0.1–0.4)
ALPHA2 GLOB SERPL ELPH-MCNC: 0.8 G/DL — SIGNIFICANT CHANGE UP (ref 0.5–1)
ANA TITR SER: NEGATIVE — SIGNIFICANT CHANGE UP
B-GLOBULIN SERPL ELPH-MCNC: 1 G/DL — SIGNIFICANT CHANGE UP (ref 0.5–1)
GAMMA GLOBULIN: 1.1 G/DL — SIGNIFICANT CHANGE UP (ref 0.6–1.6)
GLUCOSE BLDC GLUCOMTR-MCNC: 189 MG/DL — HIGH (ref 70–99)
GLUCOSE BLDC GLUCOMTR-MCNC: 227 MG/DL — HIGH (ref 70–99)
GLUCOSE BLDC GLUCOMTR-MCNC: 236 MG/DL — HIGH (ref 70–99)
GLUCOSE BLDC GLUCOMTR-MCNC: 245 MG/DL — HIGH (ref 70–99)
INTERPRETATION SERPL IFE-IMP: SIGNIFICANT CHANGE UP
PROT PATTERN SERPL ELPH-IMP: SIGNIFICANT CHANGE UP
PROT SERPL-MCNC: 6.9 G/DL — SIGNIFICANT CHANGE UP (ref 6–8.3)
PROT SERPL-MCNC: 6.9 G/DL — SIGNIFICANT CHANGE UP (ref 6–8.3)
T4 FREE SERPL-MCNC: 1.4 NG/DL — SIGNIFICANT CHANGE UP (ref 0.9–1.8)

## 2022-04-21 PROCEDURE — 99232 SBSQ HOSP IP/OBS MODERATE 35: CPT

## 2022-04-21 RX ORDER — HYDRALAZINE HCL 50 MG
25 TABLET ORAL EVERY 8 HOURS
Refills: 0 | Status: DISCONTINUED | OUTPATIENT
Start: 2022-04-21 | End: 2022-05-11

## 2022-04-21 RX ORDER — AMLODIPINE BESYLATE 2.5 MG/1
10 TABLET ORAL DAILY
Refills: 0 | Status: DISCONTINUED | OUTPATIENT
Start: 2022-04-21 | End: 2022-05-11

## 2022-04-21 RX ADMIN — GABAPENTIN 100 MILLIGRAM(S): 400 CAPSULE ORAL at 21:33

## 2022-04-21 RX ADMIN — MEMANTINE HYDROCHLORIDE 10 MILLIGRAM(S): 10 TABLET ORAL at 12:40

## 2022-04-21 RX ADMIN — AMLODIPINE BESYLATE 10 MILLIGRAM(S): 2.5 TABLET ORAL at 18:54

## 2022-04-21 RX ADMIN — GABAPENTIN 100 MILLIGRAM(S): 400 CAPSULE ORAL at 13:16

## 2022-04-21 RX ADMIN — INSULIN GLARGINE 25 UNIT(S): 100 INJECTION, SOLUTION SUBCUTANEOUS at 21:33

## 2022-04-21 RX ADMIN — Medication 2: at 08:20

## 2022-04-21 RX ADMIN — Medication 25 MILLIGRAM(S): at 21:33

## 2022-04-21 RX ADMIN — Medication 1: at 17:21

## 2022-04-21 RX ADMIN — LISINOPRIL 20 MILLIGRAM(S): 2.5 TABLET ORAL at 05:02

## 2022-04-21 RX ADMIN — ENOXAPARIN SODIUM 40 MILLIGRAM(S): 100 INJECTION SUBCUTANEOUS at 12:41

## 2022-04-21 RX ADMIN — GABAPENTIN 100 MILLIGRAM(S): 400 CAPSULE ORAL at 05:02

## 2022-04-21 RX ADMIN — Medication 8 UNIT(S): at 17:22

## 2022-04-21 RX ADMIN — Medication 81 MILLIGRAM(S): at 12:51

## 2022-04-21 RX ADMIN — DONEPEZIL HYDROCHLORIDE 10 MILLIGRAM(S): 10 TABLET, FILM COATED ORAL at 21:33

## 2022-04-21 RX ADMIN — ATORVASTATIN CALCIUM 40 MILLIGRAM(S): 80 TABLET, FILM COATED ORAL at 21:33

## 2022-04-21 RX ADMIN — Medication 8 UNIT(S): at 08:21

## 2022-04-21 RX ADMIN — Medication 8 UNIT(S): at 12:44

## 2022-04-21 RX ADMIN — Medication 2: at 12:42

## 2022-04-21 NOTE — PROGRESS NOTE ADULT - SUBJECTIVE AND OBJECTIVE BOX
Patient seen and examined at bedside  daughter at bedside. used for [facility] interpretation  alert today. basic words spoken. in [facility] patient denies any complaints of pain fever  generalized weakness overall     Review of Systems:  =Review of Systems:  General:denies fever chills, headache, weakness  HEENT: denies blurry vision,diffculty swallowing, difficulty hearing, tinnitus  Cardiovascular: denies chest pain  ,palpitations  Pulmonary:denies shortness of breath, cough, wheezing, hemoptysis  Gastrointestinal: denies abdominal pain, constipation, diarrhea,nausea , vomiting, hematochezia  : denies hematuria, dysuria, or incontinence  Neurological: denies weakness, numbness , tingling, dizziness, tremors  MSK: denies muscle pain, difficulty ambulating, swelling, back pain  skin: denies skin rash, itching, burning, or  skin lesions  Psychiatrical: denies mood disturbances, anxierty, feeling depressed, depression , or difficulty sleeping  Objective:  Vitals  ICU Vital Signs Last 24 Hrs  T(C): 36.9 (21 Apr 2022 11:44), Max: 36.9 (21 Apr 2022 11:44)  T(F): 98.5 (21 Apr 2022 11:44), Max: 98.5 (21 Apr 2022 11:44)  HR: 79 (21 Apr 2022 11:44) (58 - 109)  BP: 160/85 (21 Apr 2022 11:44) (160/85 - 198/92)  BP(mean): 115 (21 Apr 2022 04:46) (115 - 115)  ABP: --  ABP(mean): --  RR: 19 (21 Apr 2022 11:44) (18 - 19)  SpO2: 100% (21 Apr 2022 11:44) (90% - 100%)      Physical Exam:  General: comfortable, no acute distress, well nourished  HEENT: Atraumatic, no LAD, trachea midline, PERRLA  Cardiovascular: normal s1s2, no murmurs, gallops or fricition rubs  Pulmonary: clear to ausculation Bilaterally, no wheezing , rhonchi  Gastrointestinal: soft non tender non distended, no masses felt, no organomegally  Muscloskeletal: no lower extremity edema, intact bilateral lower extremity pulses  Neurological: CN II-12 intact. No focal weakness. confused. alert x 1.  Psychiatrical: normal mood, cooperative  SKIN: no rash, lesions or ulcers    Labs:      04-20    139  |  107  |  11  ----------------------------<  176<H>  3.6   |  25  |  0.80    Ca    9.0      20 Apr 2022 08:05              Active Medications  MEDICATIONS  (STANDING):  aspirin  chewable 81 milliGRAM(s) Oral daily  atorvastatin 40 milliGRAM(s) Oral at bedtime  dextrose 5%. 1000 milliLiter(s) (50 mL/Hr) IV Continuous <Continuous>  dextrose 5%. 1000 milliLiter(s) (100 mL/Hr) IV Continuous <Continuous>  dextrose 50% Injectable 25 Gram(s) IV Push once  dextrose 50% Injectable 12.5 Gram(s) IV Push once  dextrose 50% Injectable 25 Gram(s) IV Push once  donepezil 10 milliGRAM(s) Oral at bedtime  enoxaparin Injectable 40 milliGRAM(s) SubCutaneous every 24 hours  gabapentin 100 milliGRAM(s) Oral three times a day  glucagon  Injectable 1 milliGRAM(s) IntraMuscular once  insulin glargine Injectable (LANTUS) 25 Unit(s) SubCutaneous at bedtime  insulin lispro (ADMELOG) corrective regimen sliding scale   SubCutaneous three times a day before meals  insulin lispro (ADMELOG) corrective regimen sliding scale   SubCutaneous at bedtime  insulin lispro Injectable (ADMELOG) 8 Unit(s) SubCutaneous three times a day before meals  lisinopril 20 milliGRAM(s) Oral daily  memantine 10 milliGRAM(s) Oral daily    MEDICATIONS  (PRN):  acetaminophen     Tablet .. 650 milliGRAM(s) Oral every 6 hours PRN Temp greater or equal to 38C (100.4F), Mild Pain (1 - 3)  aluminum hydroxide/magnesium hydroxide/simethicone Suspension 30 milliLiter(s) Oral every 4 hours PRN Dyspepsia  dextrose Oral Gel 15 Gram(s) Oral once PRN Blood Glucose LESS THAN 70 milliGRAM(s)/deciliter  melatonin 3 milliGRAM(s) Oral at bedtime PRN Insomnia  ondansetron Injectable 4 milliGRAM(s) IV Push every 8 hours PRN Nausea and/or Vomiting

## 2022-04-22 LAB
ANION GAP SERPL CALC-SCNC: 6 MMOL/L — SIGNIFICANT CHANGE UP (ref 5–17)
BUN SERPL-MCNC: 10 MG/DL — SIGNIFICANT CHANGE UP (ref 7–23)
CALCIUM SERPL-MCNC: 8.8 MG/DL — SIGNIFICANT CHANGE UP (ref 8.5–10.1)
CHLORIDE SERPL-SCNC: 109 MMOL/L — HIGH (ref 96–108)
CO2 SERPL-SCNC: 27 MMOL/L — SIGNIFICANT CHANGE UP (ref 22–31)
CREAT SERPL-MCNC: 0.86 MG/DL — SIGNIFICANT CHANGE UP (ref 0.5–1.3)
EGFR: 71 ML/MIN/1.73M2 — SIGNIFICANT CHANGE UP
FLUAV AG NPH QL: SIGNIFICANT CHANGE UP
FLUBV AG NPH QL: SIGNIFICANT CHANGE UP
GLUCOSE BLDC GLUCOMTR-MCNC: 202 MG/DL — HIGH (ref 70–99)
GLUCOSE BLDC GLUCOMTR-MCNC: 231 MG/DL — HIGH (ref 70–99)
GLUCOSE BLDC GLUCOMTR-MCNC: 236 MG/DL — HIGH (ref 70–99)
GLUCOSE BLDC GLUCOMTR-MCNC: 256 MG/DL — HIGH (ref 70–99)
GLUCOSE BLDC GLUCOMTR-MCNC: 279 MG/DL — HIGH (ref 70–99)
GLUCOSE SERPL-MCNC: 183 MG/DL — HIGH (ref 70–99)
POTASSIUM SERPL-MCNC: 3.5 MMOL/L — SIGNIFICANT CHANGE UP (ref 3.5–5.3)
POTASSIUM SERPL-SCNC: 3.5 MMOL/L — SIGNIFICANT CHANGE UP (ref 3.5–5.3)
SARS-COV-2 RNA SPEC QL NAA+PROBE: SIGNIFICANT CHANGE UP
SODIUM SERPL-SCNC: 142 MMOL/L — SIGNIFICANT CHANGE UP (ref 135–145)

## 2022-04-22 PROCEDURE — 99232 SBSQ HOSP IP/OBS MODERATE 35: CPT

## 2022-04-22 RX ADMIN — LISINOPRIL 20 MILLIGRAM(S): 2.5 TABLET ORAL at 05:26

## 2022-04-22 RX ADMIN — Medication 8 UNIT(S): at 18:06

## 2022-04-22 RX ADMIN — GABAPENTIN 100 MILLIGRAM(S): 400 CAPSULE ORAL at 05:26

## 2022-04-22 RX ADMIN — Medication 8 UNIT(S): at 11:57

## 2022-04-22 RX ADMIN — Medication 25 MILLIGRAM(S): at 21:16

## 2022-04-22 RX ADMIN — Medication 8 UNIT(S): at 09:13

## 2022-04-22 RX ADMIN — ENOXAPARIN SODIUM 40 MILLIGRAM(S): 100 INJECTION SUBCUTANEOUS at 11:58

## 2022-04-22 RX ADMIN — ATORVASTATIN CALCIUM 40 MILLIGRAM(S): 80 TABLET, FILM COATED ORAL at 21:17

## 2022-04-22 RX ADMIN — Medication 2: at 09:12

## 2022-04-22 RX ADMIN — Medication 25 MILLIGRAM(S): at 13:24

## 2022-04-22 RX ADMIN — AMLODIPINE BESYLATE 10 MILLIGRAM(S): 2.5 TABLET ORAL at 05:26

## 2022-04-22 RX ADMIN — Medication 81 MILLIGRAM(S): at 11:58

## 2022-04-22 RX ADMIN — GABAPENTIN 100 MILLIGRAM(S): 400 CAPSULE ORAL at 21:16

## 2022-04-22 RX ADMIN — Medication 2: at 18:05

## 2022-04-22 RX ADMIN — INSULIN GLARGINE 25 UNIT(S): 100 INJECTION, SOLUTION SUBCUTANEOUS at 21:15

## 2022-04-22 RX ADMIN — Medication 25 MILLIGRAM(S): at 05:26

## 2022-04-22 RX ADMIN — MEMANTINE HYDROCHLORIDE 10 MILLIGRAM(S): 10 TABLET ORAL at 11:58

## 2022-04-22 RX ADMIN — GABAPENTIN 100 MILLIGRAM(S): 400 CAPSULE ORAL at 13:27

## 2022-04-22 RX ADMIN — DONEPEZIL HYDROCHLORIDE 10 MILLIGRAM(S): 10 TABLET, FILM COATED ORAL at 21:16

## 2022-04-22 RX ADMIN — Medication 3: at 11:56

## 2022-04-22 NOTE — PROGRESS NOTE ADULT - SUBJECTIVE AND OBJECTIVE BOX
Patient seen and examined at bedside  daughter at bedside. used for [facility] interpretation  alert today. basic words spoken. in [facility] patient denies any complaints of pain fever  generalized weakness overall     Review of Systems:  =Review of Systems:  General:denies fever chills, headache, weakness  HEENT: denies blurry vision,diffculty swallowing, difficulty hearing, tinnitus  Cardiovascular: denies chest pain  ,palpitations  Pulmonary:denies shortness of breath, cough, wheezing, hemoptysis  Gastrointestinal: denies abdominal pain, constipation, diarrhea,nausea , vomiting, hematochezia  : denies hematuria, dysuria, or incontinence  Neurological: denies weakness, numbness , tingling, dizziness, tremors  MSK: denies muscle pain, difficulty ambulating, swelling, back pain  skin: denies skin rash, itching, burning, or  skin lesions  Psychiatrical: denies mood disturbances, anxierty, feeling depressed, depression , or difficulty sleeping  Objective:  Vitals:  ICU Vital Signs Last 24 Hrs  T(C): 36.2 (22 Apr 2022 10:56), Max: 36.7 (21 Apr 2022 17:03)  T(F): 97.2 (22 Apr 2022 10:56), Max: 98 (21 Apr 2022 17:03)  HR: 90 (22 Apr 2022 10:56) (71 - 109)  BP: 145/76 (22 Apr 2022 10:56) (138/79 - 171/88)  BP(mean): 109 (22 Apr 2022 04:54) (99 - 109)  ABP: --  ABP(mean): --  RR: 18 (22 Apr 2022 10:56) (17 - 19)  SpO2: 99% (22 Apr 2022 10:56) (96% - 100%)      Physical Exam:  General: comfortable, no acute distress, well nourished  HEENT: Atraumatic, no LAD, trachea midline, PERRLA  Cardiovascular: normal s1s2, no murmurs, gallops or fricition rubs  Pulmonary: clear to ausculation Bilaterally, no wheezing , rhonchi  Gastrointestinal: soft non tender non distended, no masses felt, no organomegally  Muscloskeletal: no lower extremity edema, intact bilateral lower extremity pulses  Neurological: CN II-12 intact. No focal weakness. confused. alert x 1.  Psychiatrical: normal mood, cooperative  SKIN: no rash, lesions or ulcers    Labs:      04-20    139  |  107  |  11  ----------------------------<  176<H>  3.6   |  25  |  0.80    Ca    9.0      20 Apr 2022 08:05              Active Medications  MEDICATIONS  (STANDING):  aspirin  chewable 81 milliGRAM(s) Oral daily  atorvastatin 40 milliGRAM(s) Oral at bedtime  dextrose 5%. 1000 milliLiter(s) (50 mL/Hr) IV Continuous <Continuous>  dextrose 5%. 1000 milliLiter(s) (100 mL/Hr) IV Continuous <Continuous>  dextrose 50% Injectable 25 Gram(s) IV Push once  dextrose 50% Injectable 12.5 Gram(s) IV Push once  dextrose 50% Injectable 25 Gram(s) IV Push once  donepezil 10 milliGRAM(s) Oral at bedtime  enoxaparin Injectable 40 milliGRAM(s) SubCutaneous every 24 hours  gabapentin 100 milliGRAM(s) Oral three times a day  glucagon  Injectable 1 milliGRAM(s) IntraMuscular once  insulin glargine Injectable (LANTUS) 25 Unit(s) SubCutaneous at bedtime  insulin lispro (ADMELOG) corrective regimen sliding scale   SubCutaneous three times a day before meals  insulin lispro (ADMELOG) corrective regimen sliding scale   SubCutaneous at bedtime  insulin lispro Injectable (ADMELOG) 8 Unit(s) SubCutaneous three times a day before meals  lisinopril 20 milliGRAM(s) Oral daily  memantine 10 milliGRAM(s) Oral daily    MEDICATIONS  (PRN):  acetaminophen     Tablet .. 650 milliGRAM(s) Oral every 6 hours PRN Temp greater or equal to 38C (100.4F), Mild Pain (1 - 3)  aluminum hydroxide/magnesium hydroxide/simethicone Suspension 30 milliLiter(s) Oral every 4 hours PRN Dyspepsia  dextrose Oral Gel 15 Gram(s) Oral once PRN Blood Glucose LESS THAN 70 milliGRAM(s)/deciliter  melatonin 3 milliGRAM(s) Oral at bedtime PRN Insomnia  ondansetron Injectable 4 milliGRAM(s) IV Push every 8 hours PRN Nausea and/or Vomiting

## 2022-04-22 NOTE — PROGRESS NOTE ADULT - ASSESSMENT
72 YOF PMHx dementia, HTN, IDDMII presenting with worsened AMS.    Metabolic encephalopathy due a progressive decline of alzhemiers vs frontaltemporal dementia  daughter reports patient has been seen by x neurologists and was tried on aricept and namenda in hopes it would slow the decline  Neurology appreciated.  we cannot rule out polypharmacy.   Today patient is "marginally more alert"  No signs of infectious cause.   MRI with artifact although no concerning signs of CVA  plan  extensive counceling given to patients daughter . she understands patients progression    Hyponatremia, related to HCTZ  resolved      HTN / HTN urgency   lisinopril 20mg daily    IDDM type II  sliding scale    Dysphagia: diet puree, mod thick, speech eval.    vte prophy: lovenox    dispo PT recommending SNF, family agreeable. SW on case looking for facilities

## 2022-04-23 LAB
GLUCOSE BLDC GLUCOMTR-MCNC: 181 MG/DL — HIGH (ref 70–99)
GLUCOSE BLDC GLUCOMTR-MCNC: 207 MG/DL — HIGH (ref 70–99)
GLUCOSE BLDC GLUCOMTR-MCNC: 235 MG/DL — HIGH (ref 70–99)
GLUCOSE BLDC GLUCOMTR-MCNC: 251 MG/DL — HIGH (ref 70–99)
HOMOCYSTEINE LEVEL: 12.9 UMOL/L — HIGH
METHYLMALONIC ACID LEVEL: 202 NMOL/L — SIGNIFICANT CHANGE UP (ref 87–318)

## 2022-04-23 PROCEDURE — 99233 SBSQ HOSP IP/OBS HIGH 50: CPT

## 2022-04-23 RX ADMIN — Medication 1: at 21:35

## 2022-04-23 RX ADMIN — Medication 8 UNIT(S): at 12:39

## 2022-04-23 RX ADMIN — GABAPENTIN 100 MILLIGRAM(S): 400 CAPSULE ORAL at 05:22

## 2022-04-23 RX ADMIN — Medication 8 UNIT(S): at 08:41

## 2022-04-23 RX ADMIN — Medication 25 MILLIGRAM(S): at 05:21

## 2022-04-23 RX ADMIN — Medication 81 MILLIGRAM(S): at 12:40

## 2022-04-23 RX ADMIN — Medication 2: at 12:38

## 2022-04-23 RX ADMIN — AMLODIPINE BESYLATE 10 MILLIGRAM(S): 2.5 TABLET ORAL at 05:22

## 2022-04-23 RX ADMIN — MEMANTINE HYDROCHLORIDE 10 MILLIGRAM(S): 10 TABLET ORAL at 12:40

## 2022-04-23 RX ADMIN — LISINOPRIL 20 MILLIGRAM(S): 2.5 TABLET ORAL at 05:21

## 2022-04-23 RX ADMIN — Medication 2: at 16:51

## 2022-04-23 RX ADMIN — INSULIN GLARGINE 25 UNIT(S): 100 INJECTION, SOLUTION SUBCUTANEOUS at 21:34

## 2022-04-23 RX ADMIN — GABAPENTIN 100 MILLIGRAM(S): 400 CAPSULE ORAL at 21:34

## 2022-04-23 RX ADMIN — Medication 8 UNIT(S): at 16:51

## 2022-04-23 RX ADMIN — GABAPENTIN 100 MILLIGRAM(S): 400 CAPSULE ORAL at 14:44

## 2022-04-23 RX ADMIN — DONEPEZIL HYDROCHLORIDE 10 MILLIGRAM(S): 10 TABLET, FILM COATED ORAL at 21:36

## 2022-04-23 RX ADMIN — ENOXAPARIN SODIUM 40 MILLIGRAM(S): 100 INJECTION SUBCUTANEOUS at 12:50

## 2022-04-23 RX ADMIN — Medication 1: at 08:37

## 2022-04-23 RX ADMIN — ATORVASTATIN CALCIUM 40 MILLIGRAM(S): 80 TABLET, FILM COATED ORAL at 21:36

## 2022-04-23 RX ADMIN — Medication 25 MILLIGRAM(S): at 14:44

## 2022-04-23 NOTE — PROGRESS NOTE ADULT - ASSESSMENT
72 YOF PMHx dementia, HTN, IDDMII presenting with worsened AMS.    Metabolic encephalopathy due a progressive decline of alzhemiers vs frontaltemporal dementia  daughter reports patient has been seen by x neurologists and was tried on aricept and namenda in hopes it would slow the decline  Neurology appreciated.  we cannot rule out polypharmacy.   Today patient is "marginally more alert"  No signs of infectious cause.   MRI with artifact although no concerning signs of CVA  plan  extensive counceling given to patients daughter . she understands patients progression    Hyponatremia, related to HCTZ  resolved      HTN / HTN urgency   lisinopril 20mg daily    IDDM type II  sliding scale    Dysphagia: diet puree, mod thick, speech eval.    vte prophy: lovenox    dispo PT recommending SNF, family agreeable. SW on case looking for facilities. Insurance office closed this weekend will have to wait till monday

## 2022-04-23 NOTE — PROGRESS NOTE ADULT - SUBJECTIVE AND OBJECTIVE BOX
Patient seen and examined at bedside  daughter at bedside. used for [facility] interpretation  alert today. basic words spoken. in [facility] patient denies any complaints of pain fever  generalized weakness overall     Review of Systems:  Review of Systems:  General:denies fever chills, headache, weakness  HEENT: denies blurry vision,diffculty swallowing, difficulty hearing, tinnitus  Cardiovascular: denies chest pain  ,palpitations  Pulmonary:denies shortness of breath, cough, wheezing, hemoptysis  Gastrointestinal: denies abdominal pain, constipation, diarrhea,nausea , vomiting, hematochezia  : denies hematuria, dysuria, or incontinence  Neurological: denies weakness, numbness , tingling, dizziness, tremors  MSK: denies muscle pain, difficulty ambulating, swelling, back pain  skin: denies skin rash, itching, burning, or  skin lesions  Psychiatrical: denies mood disturbances, anxierty, feeling depressed, depression , or difficulty sleeping    Objective:  Vitals:  ICU Vital Signs Last 24 Hrs  T(C): 36.4 (23 Apr 2022 04:59), Max: 36.9 (23 Apr 2022 00:22)  T(F): 97.5 (23 Apr 2022 04:59), Max: 98.4 (23 Apr 2022 00:22)  HR: 87 (23 Apr 2022 04:59) (82 - 90)  BP: 158/89 (23 Apr 2022 04:59) (145/76 - 167/94)  BP(mean): --  ABP: --  ABP(mean): --  RR: 18 (23 Apr 2022 04:59) (18 - 18)  SpO2: 100% (23 Apr 2022 04:59) (98% - 100%)        Physical Exam:  General: comfortable, no acute distress, well nourished  HEENT: Atraumatic, no LAD, trachea midline, PERRLA  Cardiovascular: normal s1s2, no murmurs, gallops or fricition rubs  Pulmonary: clear to ausculation Bilaterally, no wheezing , rhonchi  Gastrointestinal: soft non tender non distended, no masses felt, no organomegally  Muscloskeletal: no lower extremity edema, intact bilateral lower extremity pulses  Neurological: CN II-12 intact. No focal weakness. confused. alert x 1.  Psychiatrical: normal mood, cooperative  SKIN: no rash, lesions or ulcers    Labs:      04-20    139  |  107  |  11  ----------------------------<  176<H>  3.6   |  25  |  0.80    Ca    9.0      20 Apr 2022 08:05              Active Medications  MEDICATIONS  (STANDING):  aspirin  chewable 81 milliGRAM(s) Oral daily  atorvastatin 40 milliGRAM(s) Oral at bedtime  dextrose 5%. 1000 milliLiter(s) (50 mL/Hr) IV Continuous <Continuous>  dextrose 5%. 1000 milliLiter(s) (100 mL/Hr) IV Continuous <Continuous>  dextrose 50% Injectable 25 Gram(s) IV Push once  dextrose 50% Injectable 12.5 Gram(s) IV Push once  dextrose 50% Injectable 25 Gram(s) IV Push once  donepezil 10 milliGRAM(s) Oral at bedtime  enoxaparin Injectable 40 milliGRAM(s) SubCutaneous every 24 hours  gabapentin 100 milliGRAM(s) Oral three times a day  glucagon  Injectable 1 milliGRAM(s) IntraMuscular once  insulin glargine Injectable (LANTUS) 25 Unit(s) SubCutaneous at bedtime  insulin lispro (ADMELOG) corrective regimen sliding scale   SubCutaneous three times a day before meals  insulin lispro (ADMELOG) corrective regimen sliding scale   SubCutaneous at bedtime  insulin lispro Injectable (ADMELOG) 8 Unit(s) SubCutaneous three times a day before meals  lisinopril 20 milliGRAM(s) Oral daily  memantine 10 milliGRAM(s) Oral daily    MEDICATIONS  (PRN):  acetaminophen     Tablet .. 650 milliGRAM(s) Oral every 6 hours PRN Temp greater or equal to 38C (100.4F), Mild Pain (1 - 3)  aluminum hydroxide/magnesium hydroxide/simethicone Suspension 30 milliLiter(s) Oral every 4 hours PRN Dyspepsia  dextrose Oral Gel 15 Gram(s) Oral once PRN Blood Glucose LESS THAN 70 milliGRAM(s)/deciliter  melatonin 3 milliGRAM(s) Oral at bedtime PRN Insomnia  ondansetron Injectable 4 milliGRAM(s) IV Push every 8 hours PRN Nausea and/or Vomiting

## 2022-04-24 LAB
GLUCOSE BLDC GLUCOMTR-MCNC: 145 MG/DL — HIGH (ref 70–99)
GLUCOSE BLDC GLUCOMTR-MCNC: 262 MG/DL — HIGH (ref 70–99)
GLUCOSE BLDC GLUCOMTR-MCNC: 269 MG/DL — HIGH (ref 70–99)
GLUCOSE BLDC GLUCOMTR-MCNC: 306 MG/DL — HIGH (ref 70–99)
GLUCOSE BLDC GLUCOMTR-MCNC: 334 MG/DL — HIGH (ref 70–99)

## 2022-04-24 PROCEDURE — 99231 SBSQ HOSP IP/OBS SF/LOW 25: CPT

## 2022-04-24 PROCEDURE — 99233 SBSQ HOSP IP/OBS HIGH 50: CPT

## 2022-04-24 RX ADMIN — MEMANTINE HYDROCHLORIDE 10 MILLIGRAM(S): 10 TABLET ORAL at 11:09

## 2022-04-24 RX ADMIN — ATORVASTATIN CALCIUM 40 MILLIGRAM(S): 80 TABLET, FILM COATED ORAL at 22:41

## 2022-04-24 RX ADMIN — Medication 8 UNIT(S): at 07:47

## 2022-04-24 RX ADMIN — GABAPENTIN 100 MILLIGRAM(S): 400 CAPSULE ORAL at 22:41

## 2022-04-24 RX ADMIN — GABAPENTIN 100 MILLIGRAM(S): 400 CAPSULE ORAL at 05:01

## 2022-04-24 RX ADMIN — DONEPEZIL HYDROCHLORIDE 10 MILLIGRAM(S): 10 TABLET, FILM COATED ORAL at 22:41

## 2022-04-24 RX ADMIN — Medication 3: at 17:42

## 2022-04-24 RX ADMIN — Medication 4: at 11:16

## 2022-04-24 RX ADMIN — INSULIN GLARGINE 25 UNIT(S): 100 INJECTION, SOLUTION SUBCUTANEOUS at 22:42

## 2022-04-24 RX ADMIN — AMLODIPINE BESYLATE 10 MILLIGRAM(S): 2.5 TABLET ORAL at 05:01

## 2022-04-24 RX ADMIN — LISINOPRIL 20 MILLIGRAM(S): 2.5 TABLET ORAL at 05:01

## 2022-04-24 RX ADMIN — Medication 81 MILLIGRAM(S): at 11:09

## 2022-04-24 RX ADMIN — Medication 8 UNIT(S): at 17:43

## 2022-04-24 RX ADMIN — Medication 8 UNIT(S): at 11:16

## 2022-04-24 RX ADMIN — Medication 25 MILLIGRAM(S): at 05:01

## 2022-04-24 RX ADMIN — Medication 2: at 22:42

## 2022-04-24 RX ADMIN — GABAPENTIN 100 MILLIGRAM(S): 400 CAPSULE ORAL at 13:55

## 2022-04-24 RX ADMIN — ENOXAPARIN SODIUM 40 MILLIGRAM(S): 100 INJECTION SUBCUTANEOUS at 11:09

## 2022-04-24 NOTE — PROGRESS NOTE ADULT - ASSESSMENT
Primary Degenerative Dementia.  Now bedridden, non-verbal and incontinent.  Loss of speech very late in clinical course.  No behavioral problems.  No overt extrapyramidal features.      Clinical course consistent with Alzheimer's disease more than with Fronto-Temporal dementia, Primary Progressive Aphasia, or Multiple System Atrophy.      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       RECOMMENDATIONS    PROM exercises to prevent contractures.    Mobilization program per PT recommendations.      Folic acid 1mg PO daily.      Follow-up on serum B1 (thiamine) level.  Deficiency can cause dementia.      B-complex or multivitamin with B-complex daily.

## 2022-04-24 NOTE — PROGRESS NOTE ADULT - SUBJECTIVE AND OBJECTIVE BOX
This is in follow-up to the last neurology note, of 4/20/22.    As previously noted:    Pt has been slowly declining cognitively  for at least 5 to 7 years.  She was walking on her own until last summer, when she first began to need assistance to get around.  Hand tremors first noted about 2 to 3 weeks ago.  About two weeks Pt's  had to assist her  to go to and from the bathroom.  In the past week or so she has fallen thrice, rolling out of bed twice.  She used to be able to say she had to pee or poop.  Now she defecates and urinates in bed.  She no longer speaks, only babbles nonsense.  Does not follow verbal requests.       Pt's decline has continued while being on donepezil and memantine.  Daughter thinks they have had no benefit.  Apparently was told by neurologist no benefit would be expected at a certain stage.         Results of lab tests ordered 4/19/22:    B12/folate  476/5.9   methylmalonic acid/homocysteine  201/12.9   25-OH vitamin D level  30.4   vitamin B1 level  pending   ESR/CRP  13/4   LORIE  neg   SPEP  normal pattern   repeat TSH  2.55   FT4  1.4  ammonia  11   syphilis serology  neg    ferritin  56.

## 2022-04-24 NOTE — PROGRESS NOTE ADULT - SUBJECTIVE AND OBJECTIVE BOX
Patient seen and examined at bedside  daughter at bedside. used for [facility] interpretation  alert today. basic words spoken. in [facility] patient denies any complaints of pain fever  generalized weakness overall     Review of Systems:  General:denies fever chills, headache, weakness  HEENT: denies blurry vision,diffculty swallowing, difficulty hearing, tinnitus  Cardiovascular: denies chest pain  ,palpitations  Pulmonary:denies shortness of breath, cough, wheezing, hemoptysis  Gastrointestinal: denies abdominal pain, constipation, diarrhea,nausea , vomiting, hematochezia  : denies hematuria, dysuria, or incontinence  Neurological: denies weakness, numbness , tingling, dizziness, tremors  MSK: denies muscle pain, difficulty ambulating, swelling, back pain  skin: denies skin rash, itching, burning, or  skin lesions  Psychiatrical: denies mood disturbances, anxierty, feeling depressed, depression , or difficulty sleeping    Objective:  Vitals:  ICU Vital Signs Last 24 Hrs  T(C): 36.3 (24 Apr 2022 04:45), Max: 36.7 (23 Apr 2022 23:45)  T(F): 97.3 (24 Apr 2022 04:45), Max: 98 (23 Apr 2022 23:45)  HR: 61 (24 Apr 2022 04:45) (61 - 106)  BP: 156/90 (24 Apr 2022 04:45) (148/80 - 179/80)  BP(mean): --  ABP: --  ABP(mean): --  RR: 18 (24 Apr 2022 04:45) (18 - 18)  SpO2: 100% (24 Apr 2022 04:45) (100% - 100%)        Physical Exam:  General: comfortable, no acute distress, well nourished  HEENT: Atraumatic, no LAD, trachea midline, PERRLA  Cardiovascular: normal s1s2, no murmurs, gallops or fricition rubs  Pulmonary: clear to ausculation Bilaterally, no wheezing , rhonchi  Gastrointestinal: soft non tender non distended, no masses felt, no organomegally  Muscloskeletal: no lower extremity edema, intact bilateral lower extremity pulses  Neurological: CN II-12 intact. No focal weakness. confused. alert x 1.  Psychiatrical: normal mood, cooperative  SKIN: no rash, lesions or ulcers    Labs:      04-20    139  |  107  |  11  ----------------------------<  176<H>  3.6   |  25  |  0.80    Ca    9.0      20 Apr 2022 08:05              Active Medications  MEDICATIONS  (STANDING):  aspirin  chewable 81 milliGRAM(s) Oral daily  atorvastatin 40 milliGRAM(s) Oral at bedtime  dextrose 5%. 1000 milliLiter(s) (50 mL/Hr) IV Continuous <Continuous>  dextrose 5%. 1000 milliLiter(s) (100 mL/Hr) IV Continuous <Continuous>  dextrose 50% Injectable 25 Gram(s) IV Push once  dextrose 50% Injectable 12.5 Gram(s) IV Push once  dextrose 50% Injectable 25 Gram(s) IV Push once  donepezil 10 milliGRAM(s) Oral at bedtime  enoxaparin Injectable 40 milliGRAM(s) SubCutaneous every 24 hours  gabapentin 100 milliGRAM(s) Oral three times a day  glucagon  Injectable 1 milliGRAM(s) IntraMuscular once  insulin glargine Injectable (LANTUS) 25 Unit(s) SubCutaneous at bedtime  insulin lispro (ADMELOG) corrective regimen sliding scale   SubCutaneous three times a day before meals  insulin lispro (ADMELOG) corrective regimen sliding scale   SubCutaneous at bedtime  insulin lispro Injectable (ADMELOG) 8 Unit(s) SubCutaneous three times a day before meals  lisinopril 20 milliGRAM(s) Oral daily  memantine 10 milliGRAM(s) Oral daily    MEDICATIONS  (PRN):  acetaminophen     Tablet .. 650 milliGRAM(s) Oral every 6 hours PRN Temp greater or equal to 38C (100.4F), Mild Pain (1 - 3)  aluminum hydroxide/magnesium hydroxide/simethicone Suspension 30 milliLiter(s) Oral every 4 hours PRN Dyspepsia  dextrose Oral Gel 15 Gram(s) Oral once PRN Blood Glucose LESS THAN 70 milliGRAM(s)/deciliter  melatonin 3 milliGRAM(s) Oral at bedtime PRN Insomnia  ondansetron Injectable 4 milliGRAM(s) IV Push every 8 hours PRN Nausea and/or Vomiting

## 2022-04-24 NOTE — PROGRESS NOTE ADULT - ASSESSMENT
72 YOF PMHx dementia, HTN, IDDMII presenting with worsened AMS.    Metabolic encephalopathy due a progressive decline of alzhemiers vs frontaltemporal dementia  daughter reports patient has been seen by x neurologists and was tried on aricept and namenda in hopes it would slow the decline  Neurology appreciated.  we cannot rule out polypharmacy.   Today patient is "marginally more alert"  No signs of infectious cause.   MRI with artifact although no concerning signs of CVA  plan  extensive counceling given to patients daughter . she understands patients progression    Hyponatremia, related to HCTZ  resolved      HTN / HTN urgency   lisinopril 20mg daily    IDDM type II  sliding scale    Dysphagia: diet puree, mod thick, speech eval.    vte prophy: lovenox    dispo PT recommending SNF, family agreeable. SW on case looking for facilities. Insurance office closed this weekend will have to wait till monday.   Spoke to family today asking to try and persue [facility]ata and julián

## 2022-04-25 LAB
ANION GAP SERPL CALC-SCNC: 6 MMOL/L — SIGNIFICANT CHANGE UP (ref 5–17)
BUN SERPL-MCNC: 9 MG/DL — SIGNIFICANT CHANGE UP (ref 7–23)
CALCIUM SERPL-MCNC: 8.9 MG/DL — SIGNIFICANT CHANGE UP (ref 8.5–10.1)
CHLORIDE SERPL-SCNC: 110 MMOL/L — HIGH (ref 96–108)
CO2 SERPL-SCNC: 27 MMOL/L — SIGNIFICANT CHANGE UP (ref 22–31)
CREAT SERPL-MCNC: 0.8 MG/DL — SIGNIFICANT CHANGE UP (ref 0.5–1.3)
EGFR: 77 ML/MIN/1.73M2 — SIGNIFICANT CHANGE UP
GLUCOSE BLDC GLUCOMTR-MCNC: 205 MG/DL — HIGH (ref 70–99)
GLUCOSE BLDC GLUCOMTR-MCNC: 216 MG/DL — HIGH (ref 70–99)
GLUCOSE BLDC GLUCOMTR-MCNC: 218 MG/DL — HIGH (ref 70–99)
GLUCOSE BLDC GLUCOMTR-MCNC: 285 MG/DL — HIGH (ref 70–99)
GLUCOSE SERPL-MCNC: 240 MG/DL — HIGH (ref 70–99)
HCT VFR BLD CALC: 40.8 % — SIGNIFICANT CHANGE UP (ref 34.5–45)
HGB BLD-MCNC: 13.4 G/DL — SIGNIFICANT CHANGE UP (ref 11.5–15.5)
MAGNESIUM SERPL-MCNC: 2.4 MG/DL — SIGNIFICANT CHANGE UP (ref 1.6–2.6)
MCHC RBC-ENTMCNC: 27.6 PG — SIGNIFICANT CHANGE UP (ref 27–34)
MCHC RBC-ENTMCNC: 32.8 G/DL — SIGNIFICANT CHANGE UP (ref 32–36)
MCV RBC AUTO: 84 FL — SIGNIFICANT CHANGE UP (ref 80–100)
NRBC # BLD: 0 /100 WBCS — SIGNIFICANT CHANGE UP (ref 0–0)
PHOSPHATE SERPL-MCNC: 3 MG/DL — SIGNIFICANT CHANGE UP (ref 2.5–4.5)
PLATELET # BLD AUTO: 367 K/UL — SIGNIFICANT CHANGE UP (ref 150–400)
POTASSIUM SERPL-MCNC: 3.7 MMOL/L — SIGNIFICANT CHANGE UP (ref 3.5–5.3)
POTASSIUM SERPL-SCNC: 3.7 MMOL/L — SIGNIFICANT CHANGE UP (ref 3.5–5.3)
RBC # BLD: 4.86 M/UL — SIGNIFICANT CHANGE UP (ref 3.8–5.2)
RBC # FLD: 13.3 % — SIGNIFICANT CHANGE UP (ref 10.3–14.5)
SODIUM SERPL-SCNC: 143 MMOL/L — SIGNIFICANT CHANGE UP (ref 135–145)
WBC # BLD: 7.1 K/UL — SIGNIFICANT CHANGE UP (ref 3.8–10.5)
WBC # FLD AUTO: 7.1 K/UL — SIGNIFICANT CHANGE UP (ref 3.8–10.5)

## 2022-04-25 PROCEDURE — 99233 SBSQ HOSP IP/OBS HIGH 50: CPT

## 2022-04-25 RX ORDER — INSULIN GLARGINE 100 [IU]/ML
30 INJECTION, SOLUTION SUBCUTANEOUS AT BEDTIME
Refills: 0 | Status: DISCONTINUED | OUTPATIENT
Start: 2022-04-25 | End: 2022-05-11

## 2022-04-25 RX ADMIN — INSULIN GLARGINE 30 UNIT(S): 100 INJECTION, SOLUTION SUBCUTANEOUS at 21:23

## 2022-04-25 RX ADMIN — Medication 2: at 16:44

## 2022-04-25 RX ADMIN — Medication 8 UNIT(S): at 07:36

## 2022-04-25 RX ADMIN — GABAPENTIN 100 MILLIGRAM(S): 400 CAPSULE ORAL at 13:37

## 2022-04-25 RX ADMIN — Medication 8 UNIT(S): at 16:44

## 2022-04-25 RX ADMIN — AMLODIPINE BESYLATE 10 MILLIGRAM(S): 2.5 TABLET ORAL at 05:46

## 2022-04-25 RX ADMIN — DONEPEZIL HYDROCHLORIDE 10 MILLIGRAM(S): 10 TABLET, FILM COATED ORAL at 21:22

## 2022-04-25 RX ADMIN — Medication 2: at 11:05

## 2022-04-25 RX ADMIN — ATORVASTATIN CALCIUM 40 MILLIGRAM(S): 80 TABLET, FILM COATED ORAL at 21:22

## 2022-04-25 RX ADMIN — Medication 81 MILLIGRAM(S): at 11:08

## 2022-04-25 RX ADMIN — Medication 2: at 07:36

## 2022-04-25 RX ADMIN — GABAPENTIN 100 MILLIGRAM(S): 400 CAPSULE ORAL at 05:43

## 2022-04-25 RX ADMIN — MEMANTINE HYDROCHLORIDE 10 MILLIGRAM(S): 10 TABLET ORAL at 11:09

## 2022-04-25 RX ADMIN — GABAPENTIN 100 MILLIGRAM(S): 400 CAPSULE ORAL at 21:22

## 2022-04-25 RX ADMIN — ENOXAPARIN SODIUM 40 MILLIGRAM(S): 100 INJECTION SUBCUTANEOUS at 11:04

## 2022-04-25 RX ADMIN — LISINOPRIL 20 MILLIGRAM(S): 2.5 TABLET ORAL at 05:46

## 2022-04-25 RX ADMIN — Medication 25 MILLIGRAM(S): at 05:46

## 2022-04-25 RX ADMIN — Medication 1: at 21:24

## 2022-04-25 RX ADMIN — Medication 8 UNIT(S): at 11:07

## 2022-04-25 NOTE — PROGRESS NOTE ADULT - ASSESSMENT
72 YOF PMHx dementia, HTN, IDDMII presenting with worsened AMS.    Metabolic encephalopathy due a progressive decline of alzhemiers vs frontaltemporal dementia  daughter reports patient has been seen by x neurologists and was tried on aricept and namenda in hopes it would slow the decline  Neurology appreciated.  we cannot rule out polypharmacy.   Today patient is  more alert" sitting in  chair  No signs of infectious cause.   MRI with artifact although no concerning signs of CVA  plan  extensive counceling given to patients daughter . she understands patients progression    Hyponatremia, related to HCTZ  resolved      HTN / HTN urgency   lisinopril 20mg daily    IDDM type II  sliding scale    Dysphagia: diet puree, mod thick, speech eval.    vte prophy: lovenox    dispo PT recommending SNF, family agreeable. SW on case looking for facilities. Family unable to care for patient  Spoke to family today asking to try and persue [facility] and julián

## 2022-04-25 NOTE — PROGRESS NOTE ADULT - SUBJECTIVE AND OBJECTIVE BOX
Patient seen and examined  no acute overnight events  patient feels well  son at bedside  vittals stable  labs stable  NEEDS rehab    Review of Systems:  General:denies fever chills, headache, weakness  HEENT: denies blurry vision,diffculty swallowing, difficulty hearing, tinnitus  Cardiovascular: denies chest pain  ,palpitations  Pulmonary:denies shortness of breath, cough, wheezing, hemoptysis  Gastrointestinal: denies abdominal pain, constipation, diarrhea,nausea , vomiting, hematochezia  : denies hematuria, dysuria, or incontinence  Neurological: denies weakness, numbness , tingling, dizziness, tremors  MSK: denies muscle pain, difficulty ambulating, swelling, back pain  skin: denies skin rash, itching, burning, or  skin lesions  Psychiatrical: denies mood disturbances, anxierty, feeling depressed, depression , or difficulty sleeping    Objective:  Vitals  T(C): 36.4 (04-25-22 @ 11:15), Max: 37 (04-25-22 @ 05:33)  HR: 106 (04-25-22 @ 11:15) (69 - 106)  BP: 133/87 (04-25-22 @ 11:15) (103/82 - 172/77)  RR: 18 (04-25-22 @ 11:15) (17 - 18)  SpO2: 97% (04-25-22 @ 11:15) (95% - 100%)    Physical Exam:  General: comfortable, no acute distress, well nourished  HEENT: Atraumatic, no LAD, trachea midline, PERRLA  Cardiovascular: normal s1s2, no murmurs, gallops or fricition rubs  Pulmonary: clear to ausculation Bilaterally, no wheezing , rhonchi  Gastrointestinal: soft non tender non distended, no masses felt, no organomegally  Muscloskeletal: no lower extremity edema, intact bilateral lower extremity pulses  Neurological: CN II-12 intact. No focal weakness  Psychiatrical: normal mood, cooperative  SKIN: no rash, lesions or ulcers    Labs:                          13.4   7.10  )-----------( 367      ( 25 Apr 2022 10:49 )             40.8     04-25    143  |  110<H>  |  9   ----------------------------<  240<H>  3.7   |  27  |  0.80    Ca    8.9      25 Apr 2022 10:49  Phos  3.0     04-25  Mg     2.4     04-25              Active Medications  MEDICATIONS  (STANDING):  amLODIPine   Tablet 10 milliGRAM(s) Oral daily  aspirin  chewable 81 milliGRAM(s) Oral daily  atorvastatin 40 milliGRAM(s) Oral at bedtime  dextrose 5%. 1000 milliLiter(s) (50 mL/Hr) IV Continuous <Continuous>  dextrose 5%. 1000 milliLiter(s) (100 mL/Hr) IV Continuous <Continuous>  dextrose 50% Injectable 25 Gram(s) IV Push once  dextrose 50% Injectable 12.5 Gram(s) IV Push once  dextrose 50% Injectable 25 Gram(s) IV Push once  donepezil 10 milliGRAM(s) Oral at bedtime  enoxaparin Injectable 40 milliGRAM(s) SubCutaneous every 24 hours  gabapentin 100 milliGRAM(s) Oral three times a day  glucagon  Injectable 1 milliGRAM(s) IntraMuscular once  hydrALAZINE 25 milliGRAM(s) Oral every 8 hours  insulin glargine Injectable (LANTUS) 30 Unit(s) SubCutaneous at bedtime  insulin lispro (ADMELOG) corrective regimen sliding scale   SubCutaneous three times a day before meals  insulin lispro (ADMELOG) corrective regimen sliding scale   SubCutaneous at bedtime  insulin lispro Injectable (ADMELOG) 8 Unit(s) SubCutaneous three times a day before meals  lisinopril 20 milliGRAM(s) Oral daily  memantine 10 milliGRAM(s) Oral daily    MEDICATIONS  (PRN):  acetaminophen     Tablet .. 650 milliGRAM(s) Oral every 6 hours PRN Temp greater or equal to 38C (100.4F), Mild Pain (1 - 3)  aluminum hydroxide/magnesium hydroxide/simethicone Suspension 30 milliLiter(s) Oral every 4 hours PRN Dyspepsia  dextrose Oral Gel 15 Gram(s) Oral once PRN Blood Glucose LESS THAN 70 milliGRAM(s)/deciliter  melatonin 3 milliGRAM(s) Oral at bedtime PRN Insomnia  ondansetron Injectable 4 milliGRAM(s) IV Push every 8 hours PRN Nausea and/or Vomiting

## 2022-04-26 LAB
GLUCOSE BLDC GLUCOMTR-MCNC: 138 MG/DL — HIGH (ref 70–99)
GLUCOSE BLDC GLUCOMTR-MCNC: 168 MG/DL — HIGH (ref 70–99)
GLUCOSE BLDC GLUCOMTR-MCNC: 189 MG/DL — HIGH (ref 70–99)
GLUCOSE BLDC GLUCOMTR-MCNC: 192 MG/DL — HIGH (ref 70–99)
GLUCOSE BLDC GLUCOMTR-MCNC: 293 MG/DL — HIGH (ref 70–99)

## 2022-04-26 PROCEDURE — 99233 SBSQ HOSP IP/OBS HIGH 50: CPT

## 2022-04-26 RX ADMIN — Medication 8 UNIT(S): at 17:39

## 2022-04-26 RX ADMIN — Medication 1: at 08:53

## 2022-04-26 RX ADMIN — GABAPENTIN 100 MILLIGRAM(S): 400 CAPSULE ORAL at 13:21

## 2022-04-26 RX ADMIN — MEMANTINE HYDROCHLORIDE 10 MILLIGRAM(S): 10 TABLET ORAL at 12:44

## 2022-04-26 RX ADMIN — AMLODIPINE BESYLATE 10 MILLIGRAM(S): 2.5 TABLET ORAL at 06:16

## 2022-04-26 RX ADMIN — Medication 81 MILLIGRAM(S): at 12:41

## 2022-04-26 RX ADMIN — Medication 1: at 22:38

## 2022-04-26 RX ADMIN — Medication 25 MILLIGRAM(S): at 13:21

## 2022-04-26 RX ADMIN — INSULIN GLARGINE 30 UNIT(S): 100 INJECTION, SOLUTION SUBCUTANEOUS at 22:39

## 2022-04-26 RX ADMIN — Medication 1: at 12:49

## 2022-04-26 RX ADMIN — Medication 25 MILLIGRAM(S): at 06:16

## 2022-04-26 RX ADMIN — Medication 8 UNIT(S): at 12:49

## 2022-04-26 RX ADMIN — GABAPENTIN 100 MILLIGRAM(S): 400 CAPSULE ORAL at 06:15

## 2022-04-26 RX ADMIN — LISINOPRIL 20 MILLIGRAM(S): 2.5 TABLET ORAL at 06:15

## 2022-04-26 RX ADMIN — Medication 8 UNIT(S): at 08:53

## 2022-04-26 RX ADMIN — ENOXAPARIN SODIUM 40 MILLIGRAM(S): 100 INJECTION SUBCUTANEOUS at 12:54

## 2022-04-26 NOTE — PROGRESS NOTE ADULT - SUBJECTIVE AND OBJECTIVE BOX
Patient seen and examined  no acute overnight events  patient feels well  son at bedside  vittals stable  labs stable  NEEDS rehab    Review of Systems:  General:denies fever chills, headache, weakness  HEENT: denies blurry vision,diffculty swallowing, difficulty hearing, tinnitus  Cardiovascular: denies chest pain  ,palpitations  Pulmonary:denies shortness of breath, cough, wheezing, hemoptysis  Gastrointestinal: denies abdominal pain, constipation, diarrhea,nausea , vomiting, hematochezia  : denies hematuria, dysuria, or incontinence  Neurological: denies weakness, numbness , tingling, dizziness, tremors  MSK: denies muscle pain, difficulty ambulating, swelling, back pain  skin: denies skin rash, itching, burning, or  skin lesions  Psychiatrical: denies mood disturbances, anxierty, feeling depressed, depression , or difficulty sleeping    Objective:  Vitals  ICU Vital Signs Last 24 Hrs  T(C): 36.6 (26 Apr 2022 10:23), Max: 36.7 (25 Apr 2022 16:05)  T(F): 97.9 (26 Apr 2022 10:23), Max: 98.1 (25 Apr 2022 16:05)  HR: 83 (26 Apr 2022 10:23) (73 - 106)  BP: 149/69 (26 Apr 2022 10:23) (135/75 - 181/89)  BP(mean): --  ABP: --  ABP(mean): --  RR: 18 (26 Apr 2022 10:23) (18 - 18)  SpO2: 100% (26 Apr 2022 10:23) (95% - 100%)      Physical Exam:  General: comfortable, no acute distress, well nourished  HEENT: Atraumatic, no LAD, trachea midline, PERRLA  Cardiovascular: normal s1s2, no murmurs, gallops or fricition rubs  Pulmonary: clear to ausculation Bilaterally, no wheezing , rhonchi  Gastrointestinal: soft non tender non distended, no masses felt, no organomegally  Muscloskeletal: no lower extremity edema, intact bilateral lower extremity pulses  Neurological: CN II-12 intact. No focal weakness  Psychiatrical: normal mood, cooperative  SKIN: no rash, lesions or ulcers    Labs:                          13.4   7.10  )-----------( 367      ( 25 Apr 2022 10:49 )             40.8     04-25    143  |  110<H>  |  9   ----------------------------<  240<H>  3.7   |  27  |  0.80    Ca    8.9      25 Apr 2022 10:49  Phos  3.0     04-25  Mg     2.4     04-25            MEDICATIONS  (STANDING):  amLODIPine   Tablet 10 milliGRAM(s) Oral daily  aspirin  chewable 81 milliGRAM(s) Oral daily  atorvastatin 40 milliGRAM(s) Oral at bedtime  dextrose 5%. 1000 milliLiter(s) (100 mL/Hr) IV Continuous <Continuous>  dextrose 5%. 1000 milliLiter(s) (50 mL/Hr) IV Continuous <Continuous>  dextrose 50% Injectable 25 Gram(s) IV Push once  dextrose 50% Injectable 12.5 Gram(s) IV Push once  dextrose 50% Injectable 25 Gram(s) IV Push once  donepezil 10 milliGRAM(s) Oral at bedtime  enoxaparin Injectable 40 milliGRAM(s) SubCutaneous every 24 hours  gabapentin 100 milliGRAM(s) Oral three times a day  glucagon  Injectable 1 milliGRAM(s) IntraMuscular once  hydrALAZINE 25 milliGRAM(s) Oral every 8 hours  insulin glargine Injectable (LANTUS) 30 Unit(s) SubCutaneous at bedtime  insulin lispro (ADMELOG) corrective regimen sliding scale   SubCutaneous three times a day before meals  insulin lispro (ADMELOG) corrective regimen sliding scale   SubCutaneous at bedtime  insulin lispro Injectable (ADMELOG) 8 Unit(s) SubCutaneous three times a day before meals  lisinopril 20 milliGRAM(s) Oral daily  memantine 10 milliGRAM(s) Oral daily    MEDICATIONS  (PRN):  acetaminophen     Tablet .. 650 milliGRAM(s) Oral every 6 hours PRN Temp greater or equal to 38C (100.4F), Mild Pain (1 - 3)  aluminum hydroxide/magnesium hydroxide/simethicone Suspension 30 milliLiter(s) Oral every 4 hours PRN Dyspepsia  dextrose Oral Gel 15 Gram(s) Oral once PRN Blood Glucose LESS THAN 70 milliGRAM(s)/deciliter  melatonin 3 milliGRAM(s) Oral at bedtime PRN Insomnia  ondansetron Injectable 4 milliGRAM(s) IV Push every 8 hours PRN Nausea and/or Vomiting

## 2022-04-27 ENCOUNTER — TRANSCRIPTION ENCOUNTER (OUTPATIENT)
Age: 72
End: 2022-04-27

## 2022-04-27 LAB
GLUCOSE BLDC GLUCOMTR-MCNC: 186 MG/DL — HIGH (ref 70–99)
GLUCOSE BLDC GLUCOMTR-MCNC: 189 MG/DL — HIGH (ref 70–99)
GLUCOSE BLDC GLUCOMTR-MCNC: 208 MG/DL — HIGH (ref 70–99)
GLUCOSE BLDC GLUCOMTR-MCNC: 253 MG/DL — HIGH (ref 70–99)

## 2022-04-27 PROCEDURE — 99233 SBSQ HOSP IP/OBS HIGH 50: CPT

## 2022-04-27 RX ORDER — INSULIN GLARGINE 100 [IU]/ML
30 INJECTION, SOLUTION SUBCUTANEOUS
Qty: 0 | Refills: 0 | DISCHARGE
Start: 2022-04-27

## 2022-04-27 RX ORDER — INSULIN GLARGINE 100 [IU]/ML
32 INJECTION, SOLUTION SUBCUTANEOUS
Qty: 0 | Refills: 0 | DISCHARGE

## 2022-04-27 RX ORDER — LISINOPRIL 2.5 MG/1
1 TABLET ORAL
Qty: 0 | Refills: 0 | DISCHARGE

## 2022-04-27 RX ORDER — GABAPENTIN 400 MG/1
1 CAPSULE ORAL
Qty: 0 | Refills: 0 | DISCHARGE
Start: 2022-04-27

## 2022-04-27 RX ORDER — MEMANTINE HYDROCHLORIDE 10 MG/1
1 TABLET ORAL
Qty: 0 | Refills: 0 | DISCHARGE

## 2022-04-27 RX ORDER — ATORVASTATIN CALCIUM 80 MG/1
1 TABLET, FILM COATED ORAL
Qty: 0 | Refills: 0 | DISCHARGE

## 2022-04-27 RX ORDER — ASPIRIN/CALCIUM CARB/MAGNESIUM 324 MG
1 TABLET ORAL
Qty: 0 | Refills: 0 | DISCHARGE
Start: 2022-04-27

## 2022-04-27 RX ORDER — GABAPENTIN 400 MG/1
1 CAPSULE ORAL
Qty: 0 | Refills: 0 | DISCHARGE

## 2022-04-27 RX ORDER — HYDRALAZINE HCL 50 MG
1 TABLET ORAL
Qty: 0 | Refills: 0 | DISCHARGE
Start: 2022-04-27

## 2022-04-27 RX ORDER — MEMANTINE HYDROCHLORIDE 10 MG/1
1 TABLET ORAL
Qty: 0 | Refills: 0 | DISCHARGE
Start: 2022-04-27

## 2022-04-27 RX ORDER — DONEPEZIL HYDROCHLORIDE 10 MG/1
1 TABLET, FILM COATED ORAL
Qty: 0 | Refills: 0 | DISCHARGE
Start: 2022-04-27

## 2022-04-27 RX ORDER — RISPERIDONE 4 MG/1
1 TABLET ORAL
Qty: 0 | Refills: 0 | DISCHARGE

## 2022-04-27 RX ORDER — ATORVASTATIN CALCIUM 80 MG/1
1 TABLET, FILM COATED ORAL
Qty: 0 | Refills: 0 | DISCHARGE
Start: 2022-04-27

## 2022-04-27 RX ORDER — LANOLIN ALCOHOL/MO/W.PET/CERES
1 CREAM (GRAM) TOPICAL
Qty: 0 | Refills: 0 | DISCHARGE
Start: 2022-04-27

## 2022-04-27 RX ORDER — HYDRALAZINE HCL 50 MG
1 TABLET ORAL
Qty: 0 | Refills: 0 | DISCHARGE

## 2022-04-27 RX ORDER — DONEPEZIL HYDROCHLORIDE 10 MG/1
2 TABLET, FILM COATED ORAL
Qty: 0 | Refills: 0 | DISCHARGE

## 2022-04-27 RX ORDER — AMLODIPINE BESYLATE 2.5 MG/1
1 TABLET ORAL
Qty: 0 | Refills: 0 | DISCHARGE
Start: 2022-04-27

## 2022-04-27 RX ORDER — LISINOPRIL 2.5 MG/1
1 TABLET ORAL
Qty: 0 | Refills: 0 | DISCHARGE
Start: 2022-04-27

## 2022-04-27 RX ORDER — DIVALPROEX SODIUM 500 MG/1
1 TABLET, DELAYED RELEASE ORAL
Qty: 0 | Refills: 0 | DISCHARGE

## 2022-04-27 RX ADMIN — INSULIN GLARGINE 30 UNIT(S): 100 INJECTION, SOLUTION SUBCUTANEOUS at 21:58

## 2022-04-27 RX ADMIN — ATORVASTATIN CALCIUM 40 MILLIGRAM(S): 80 TABLET, FILM COATED ORAL at 21:58

## 2022-04-27 RX ADMIN — GABAPENTIN 100 MILLIGRAM(S): 400 CAPSULE ORAL at 13:37

## 2022-04-27 RX ADMIN — DONEPEZIL HYDROCHLORIDE 10 MILLIGRAM(S): 10 TABLET, FILM COATED ORAL at 00:59

## 2022-04-27 RX ADMIN — GABAPENTIN 100 MILLIGRAM(S): 400 CAPSULE ORAL at 00:58

## 2022-04-27 RX ADMIN — DONEPEZIL HYDROCHLORIDE 10 MILLIGRAM(S): 10 TABLET, FILM COATED ORAL at 21:57

## 2022-04-27 RX ADMIN — Medication 8 UNIT(S): at 17:28

## 2022-04-27 RX ADMIN — Medication 8 UNIT(S): at 11:30

## 2022-04-27 RX ADMIN — ATORVASTATIN CALCIUM 40 MILLIGRAM(S): 80 TABLET, FILM COATED ORAL at 00:58

## 2022-04-27 RX ADMIN — MEMANTINE HYDROCHLORIDE 10 MILLIGRAM(S): 10 TABLET ORAL at 11:25

## 2022-04-27 RX ADMIN — GABAPENTIN 100 MILLIGRAM(S): 400 CAPSULE ORAL at 05:08

## 2022-04-27 RX ADMIN — LISINOPRIL 20 MILLIGRAM(S): 2.5 TABLET ORAL at 05:08

## 2022-04-27 RX ADMIN — ENOXAPARIN SODIUM 40 MILLIGRAM(S): 100 INJECTION SUBCUTANEOUS at 11:26

## 2022-04-27 RX ADMIN — Medication 81 MILLIGRAM(S): at 11:26

## 2022-04-27 RX ADMIN — Medication 1: at 17:27

## 2022-04-27 RX ADMIN — GABAPENTIN 100 MILLIGRAM(S): 400 CAPSULE ORAL at 21:57

## 2022-04-27 RX ADMIN — Medication 25 MILLIGRAM(S): at 13:37

## 2022-04-27 RX ADMIN — AMLODIPINE BESYLATE 10 MILLIGRAM(S): 2.5 TABLET ORAL at 05:08

## 2022-04-27 RX ADMIN — Medication 3: at 11:29

## 2022-04-27 RX ADMIN — Medication 8 UNIT(S): at 08:21

## 2022-04-27 RX ADMIN — Medication 2: at 08:20

## 2022-04-27 NOTE — DISCHARGE NOTE PROVIDER - NSDCFUADDINST_GEN_ALL_CORE_FT
check blood pressure twice a day ( morning and night)  if systolic blood pressure ( top number ) is greater than 160 can give hydralazine 10 mg by mouth once

## 2022-04-27 NOTE — DISCHARGE NOTE PROVIDER - HOSPITAL COURSE
72 YOF PMHx dementia, HTN, IDDMII presenting with worsened AMS.    Acute Metabolic encephalopathy due a progressive decline of alzheimer's vs frontaltemporal dementia  daughter reports patient has been seen by x neurologists and was tried on aricept and namenda in hopes it would slow the decline  Neurology appreciated.  we cannot rule out polypharmacy.   Today patient is  more alert" sitting in  chair  No signs of infectious cause.   MRI with artifact although no concerning signs of CVA  extensive counceling given to patients daughter . she understands patients progression    Hyponatremia, related to HCTZ  resolved    HTNive urgency   lisinopril 20mg daily    IDDM type II  sliding scale    Dysphagia: diet puree, mod thick, speech eval.    Moderate protein-calorie malnutrition.  Diet Regular-  Consistent Carbohydrate {Evening Snack}  Pureed (PUREED)  Moderately Thick Liquids (MODTHICKLIQS)   73 yo F w/ history of dementia, HTN, IDDMII presented w/ worsened confusion.     Progressive decline of alzheimer's vs frontaltemporal dementia  - daughter reports patient has been seen by x neurologists and was tried on aricept and namenda in hopes it would slow the decline  - Neurology appreciated   - no signs of infectious cause   - MRI was motion degraded but showed no definite intracranial hemorrhage or acute infarction  - extensive counseling given to patients daughter; she understands patients progression as per EMR  - c/w Atarax, Aricept and Namenda  - given elevated homocysteine and normal MMA patient likely had folate deficiency - will start folate    Hyponatremia  - resolved  - related to HCTZ    HTN w/ Hypertensive urgency POA  - c/w lisinopril, hydralazine & Norvasc  - Echo showed EF  60-65% w/ grade I diastolic dysfunction, trace mitral valve regurgitation and mild tricuspid regurgitation  - suspect pt's pedal edema is due to Norvasc - but will c/w this medication as it does not appear to be a problem for the patient    HLD  - c/w Lipitor    IDDM type II  - c/w Lantus, mealtime lispro & ISS   - c/w gabapentin  - Hgb A1C is 9.8    Moderate protein-calorie malnutrition w/ Dysphagia  - c/w diet puree, mod thick as per speech eval   72   Assessment and Plan:   · Assessment	  73 yo F w/ history of dementia, HTN, IDDMII presented w/ worsened confusion.     Progressive decline of alzheimer's vs frontaltemporal dementia-  per documentation by my collegues the - daughter reports patient has been seen by x neurologists and was tried on aricept and namenda in hopes it would slow the decline  - Neurology consult appreciated  noted  - no signs of infectious cause   - MRI was motion degraded but showed no definite intracranial hemorrhage or acute infarction  -also per documentation by my colleagues they have had  extensive counseling given to patients daughter; she understands patients progression as per EMR  - c/w Atarax, Aricept and Namenda  was started on folic acid by m,y colleagues  given elevated homocysteine and normal MMA patient likely had folate deficiency - will start folate    Hyponatremia  - resolved      HTN w/ Hypertensive urgency POA- BP is better   - c/w lisinopril, hydralazine & Norvasc  - Echo showed EF  60-65% w/ grade I diastolic dysfunction, trace mitral valve regurgitation and mild tricuspid regurgitation  - per my colleague's documentation suspect pt's pedal edema is due to Norvasc - but it was continued  as it does not appear to be a problem for the patient    HLD  - c/w Lipitor    IDDM type II  - c/w Lantus, mealtime lispro & ISS   - c/w gabapentin  - Hgb A1C is 9.8    Moderate protein-calorie malnutrition w/ Dysphagia  - c/w diet puree, mod thick as per speech eval    Prophylaxis:  DVT: Lovenox  GI: PO diet    Dispo per SW/CM patient hasn't been participating in physical therapy and family and has been declined  for STR . patient to return to home with family ,. see SW and CM for more details         Nutritional Assessment:  · Nutritional Assessment	This patient has been assessed with a concern for Malnutrition and has been determined to have a diagnosis/diagnoses of Moderate protein-calorie malnutrition.    This patient is being managed with:   Diet Regular-  Consistent Carbohydrate {Evening Snack}  Pureed (PUREED)  Moderately Thick Liquids (MODTHICKLIQS)  Entered: Apr 17 2022  6:19AM

## 2022-04-27 NOTE — PROGRESS NOTE ADULT - ASSESSMENT
72 YOF PMHx dementia, HTN, IDDMII presenting with worsened AMS.    Acute Metabolic encephalopathy due a progressive decline of alzheimer's vs frontaltemporal dementia  daughter reports patient has been seen by x neurologists and was tried on aricept and namenda in hopes it would slow the decline  Neurology appreciated.  we cannot rule out polypharmacy.   Today patient is  more alert" sitting in  chair  No signs of infectious cause.   MRI with artifact although no concerning signs of CVA  extensive counceling given to patients daughter . she understands patients progression    Hyponatremia, related to HCTZ  resolved    HTNive urgency   lisinopril 20mg daily    IDDM type II  sliding scale    Dysphagia: diet puree, mod thick, speech eval.    Moderate protein-calorie malnutrition.  Diet Regular-  Consistent Carbohydrate {Evening Snack}  Pureed (PUREED)  Moderately Thick Liquids (MODTHICKLIQS)    vte prophy: lovenox    dispo PT recommending SNF, family agreeable. SW on case looking for facilities. Family unable to care for patient  Spoke to family today asking to try and persue aminata and julián

## 2022-04-27 NOTE — DISCHARGE NOTE PROVIDER - NSDCCPCAREPLAN_GEN_ALL_CORE_FT
PRINCIPAL DISCHARGE DIAGNOSIS  Diagnosis: Acute metabolic encephalopathy  Assessment and Plan of Treatment:       SECONDARY DISCHARGE DIAGNOSES  Diagnosis: Hyponatremia  Assessment and Plan of Treatment:     Diagnosis: Hypertensive urgency  Assessment and Plan of Treatment:     Diagnosis: Moderate protein-calorie malnutrition  Assessment and Plan of Treatment:     Diagnosis: Cerebrovascular accident (CVA)  Assessment and Plan of Treatment:     Diagnosis: Type 2 diabetes mellitus with hyperglycemia  Assessment and Plan of Treatment:     Diagnosis: HLD (hyperlipidemia)  Assessment and Plan of Treatment:     Diagnosis: Dementia  Assessment and Plan of Treatment:      PRINCIPAL DISCHARGE DIAGNOSIS  Diagnosis: Acute metabolic encephalopathy  Assessment and Plan of Treatment: 73 yo F w/ history of dementia, HTN, IDDMII presented w/ worsened confusion.   Progressive decline of alzheimer's vs frontaltemporal dementia  - daughter reports patient has been seen by x neurologists and was tried on aricept and namenda in hopes it would slow the decline  - Neurology appreciated   - no signs of infectious cause   - MRI was motion degraded but showed no definite intracranial hemorrhage or acute infarction  - extensive counseling given to patients daughter; she understands patients progression as per EMR  - c/w Atarax, Aricept and Namenda  - given elevated homocysteine and normal MMA patient likely had folate deficiency - will start folate  Hyponatremia  - resolved  - related to HCTZ  HTN w/ Hypertensive urgency POA  - c/w lisinopril, hydralazine & Norvasc  - Echo showed EF  60-65% w/ grade I diastolic dysfunction, trace mitral valve regurgitation and mild tricuspid regurgitation  - suspect pt's pedal edema is due to Norvasc - but will c/w this medication as it does not appear to be a problem for the patient  HLD  - c/w Lipitor  IDDM type II  - c/w Lantus, mealtime lispro & ISS   - c/w gabapentin  - Hgb A1C is 9.8  Moderate protein-calorie malnutrition w/ Dysphagia  - c/w diet puree, mod thick as per speech eval        SECONDARY DISCHARGE DIAGNOSES  Diagnosis: Cerebrovascular accident (CVA)  Assessment and Plan of Treatment:     Diagnosis: Hyponatremia  Assessment and Plan of Treatment:     Diagnosis: HLD (hyperlipidemia)  Assessment and Plan of Treatment:     Diagnosis: Dementia  Assessment and Plan of Treatment:     Diagnosis: Hypertensive urgency  Assessment and Plan of Treatment:     Diagnosis: Moderate protein-calorie malnutrition  Assessment and Plan of Treatment:     Diagnosis: Type 2 diabetes mellitus with hyperglycemia  Assessment and Plan of Treatment:      PRINCIPAL DISCHARGE DIAGNOSIS  Diagnosis: Acute metabolic encephalopathy  Assessment and Plan of Treatment: 71 yo F w/ history of dementia, HTN, IDDMII presented w/ worsened confusion.   Progressive decline of alzheimer's vs frontaltemporal dementia  - daughter reports patient has been seen by x neurologists and was tried on aricept and namenda in hopes it would slow the decline  - Neurology appreciated   - no signs of infectious cause   - MRI was motion degraded but showed no definite intracranial hemorrhage or acute infarction  - extensive counseling given to patients daughter; she understands patients progression as per EMR  - c/w Atarax, Aricept and Namenda  - given elevated homocysteine and normal MMA patient likely had folate deficiency - will start folate  Hyponatremia  - resolved  - related to HCTZ  HTN w/ Hypertensive urgency POA  - c/w lisinopril, hydralazine & Norvasc  - Echo showed EF  60-65% w/ grade I diastolic dysfunction, trace mitral valve regurgitation and mild tricuspid regurgitation  - suspect pt's pedal edema is due to Norvasc - but will c/w this medication as it does not appear to be a problem for the patient  HLD  - c/w Lipitor  IDDM type II  - c/w Lantus, mealtime lispro & ISS   - c/w gabapentin  - Hgb A1C is 9.8  Moderate protein-calorie malnutrition w/ Dysphagia  - c/w diet puree, mod thick as per speech eval        SECONDARY DISCHARGE DIAGNOSES  Diagnosis: Cerebrovascular accident (CVA)  Assessment and Plan of Treatment:     Diagnosis: Hyponatremia  Assessment and Plan of Treatment:     Diagnosis: HLD (hyperlipidemia)  Assessment and Plan of Treatment:     Diagnosis: Dementia  Assessment and Plan of Treatment:     Diagnosis: Hypertensive urgency  Assessment and Plan of Treatment:     Diagnosis: Moderate protein-calorie malnutrition  Assessment and Plan of Treatment:     Diagnosis: Type 2 diabetes mellitus with hyperglycemia  Assessment and Plan of Treatment:     Diagnosis: Folate deficiency  Assessment and Plan of Treatment:     Diagnosis: Chronic diastolic congestive heart failure  Assessment and Plan of Treatment:

## 2022-04-27 NOTE — DISCHARGE NOTE PROVIDER - NSDCMRMEDTOKEN_GEN_ALL_CORE_FT
amLODIPine 10 mg oral tablet: 1 tab(s) orally once a day  aspirin 81 mg oral tablet, chewable: 1 tab(s) orally once a day  atorvastatin 40 mg oral tablet: 1 tab(s) orally once a day (at bedtime)  donepezil 10 mg oral tablet: 1 tab(s) orally once a day (at bedtime)  gabapentin 100 mg oral capsule: 1 cap(s) orally 3 times a day  hydrALAZINE 25 mg oral tablet: 1 tab(s) orally every 8 hours  insulin glargine 100 units/mL subcutaneous solution: 30 unit(s) subcutaneous once a day (at bedtime)  lisinopril 20 mg oral tablet: 1 tab(s) orally once a day  melatonin 3 mg oral tablet: 1 tab(s) orally once a day (at bedtime), As needed, Insomnia  memantine 10 mg oral tablet: 1 tab(s) orally once a day  metFORMIN 1000 mg oral tablet: 1 tab(s) orally 2 times a day  Trulicity Pen 3 mg/0.5 mL subcutaneous solution: 3 milligram(s) subcutaneous weekly    amLODIPine 10 mg oral tablet: 1 tab(s) orally once a day  aspirin 81 mg oral tablet, chewable: 1 tab(s) orally once a day  atorvastatin 40 mg oral tablet: 1 tab(s) orally once a day (at bedtime)  donepezil 10 mg oral tablet: 1 tab(s) orally once a day (at bedtime)  folic acid 1 mg oral tablet: 1 tab(s) orally once a day  gabapentin 100 mg oral capsule: 1 cap(s) orally 3 times a day  hydrALAZINE 25 mg oral tablet: 1 tab(s) orally every 8 hours  insulin glargine 100 units/mL subcutaneous solution: 30 unit(s) subcutaneous once a day (at bedtime)  lisinopril 20 mg oral tablet: 1 tab(s) orally once a day  melatonin 3 mg oral tablet: 1 tab(s) orally once a day (at bedtime), As needed, Insomnia  memantine 10 mg oral tablet: 1 tab(s) orally once a day  metFORMIN 1000 mg oral tablet: 1 tab(s) orally 2 times a day  Trulicity Pen 3 mg/0.5 mL subcutaneous solution: 3 milligram(s) subcutaneous weekly    amLODIPine 10 mg oral tablet: 1 tab(s) orally once a day  aspirin 81 mg oral tablet, chewable: 1 tab(s) orally once a day  atorvastatin 40 mg oral tablet: 1 tab(s) orally once a day (at bedtime)  donepezil 10 mg oral tablet: 1 tab(s) orally once a day (at bedtime)  folic acid 1 mg oral tablet: 1 tab(s) orally once a day  gabapentin 100 mg oral capsule: 1 cap(s) orally 3 times a day  HumaLOG 100 units/mL injectable solution: 10 unit(s) injectable 3 times a day (before meals)  hydrALAZINE 25 mg oral tablet: 1 tab(s) orally every 8 hours  insulin glargine 100 units/mL subcutaneous solution: 30 unit(s) subcutaneous once a day (at bedtime)  lisinopril 20 mg oral tablet: 1 tab(s) orally once a day  melatonin 3 mg oral tablet: 1 tab(s) orally once a day (at bedtime), As needed, Insomnia  memantine 10 mg oral tablet: 1 tab(s) orally once a day  metFORMIN 1000 mg oral tablet: 1 tab(s) orally 2 times a day  Trulicity Pen 3 mg/0.5 mL subcutaneous solution: 3 milligram(s) subcutaneous weekly    amLODIPine 10 mg oral tablet: 1 tab(s) orally once a day  aspirin 81 mg oral tablet, chewable: 1 tab(s) orally once a day  atorvastatin 40 mg oral tablet: 1 tab(s) orally once a day (at bedtime)  donepezil 10 mg oral tablet: 1 tab(s) orally once a day (at bedtime)  folic acid 1 mg oral tablet: 1 tab(s) orally once a day  gabapentin 100 mg oral capsule: 1 cap(s) orally 3 times a day  HumaLOG 100 units/mL injectable solution: 10 unit(s) injectable 3 times a day (before meals)  hydrALAZINE 10 mg oral tablet: 1 tab(s) orally 2 times a day, As Needed  check blood pressure twice a day ( morning and night) if systolic blood pressure ( top number ) is greater than 160 can give hydralazine 10 mg by mouth once   insulin glargine 100 units/mL subcutaneous solution: 35 unit(s) subcutaneous once a day (at bedtime)   lisinopril 20 mg oral tablet: 1 tab(s) orally once a day  melatonin 3 mg oral tablet: 1 tab(s) orally once a day (at bedtime), As needed, Insomnia  memantine 10 mg oral tablet: 1 tab(s) orally once a day  metFORMIN 1000 mg oral tablet: 1 tab(s) orally 2 times a day

## 2022-04-27 NOTE — CHART NOTE - NSCHARTNOTEFT_GEN_A_CORE
Assessment:  Pt is Caro speaking, is confused, seen in 2C medical unit, sitting in chair, CNA assisting c feeding breakfast, tolerating diet well when observed.  Pt adm c AMS, c metabolic encephalopathy due to progressive decline of alzheimer's vs frontal temporal dementia, hyponatremia related to HCTZ resolved, HTN urgency, DM(was on metformin, Trulicity, Basaglar insulin as per home meds), dysphagia.   PMH include HTN, HLD, DM type II, dementia.   D/C plan for SNF noted.     Factors impacting intake: [ ] none [ ] nausea  [ ] vomiting [ ] diarrhea [ ] constipation  [ ]chewing problems [x ] swallowing issues; on dysphagia diet(pt was eating regular consistency PTA), swallow evaluation not noted  [x ] other: AMS    Diet Prescription: Diet, Regular:   Consistent Carbohydrate {Evening Snack}  Pureed (PUREED)  Moderately Thick Liquids (MODTHICKLIQS) (04-17-22 @ 06:20)    Intake: overall intake >75% @ present    Current Weight: 04/27, 62.3 kg, 04/24, 63.4 kg, 04/17, 62 kg, c wt. fluctuation c wt. gain of 0.3 kg @ present  % Weight Change: 0.5%  No edema noted   Nutrition focused physical exam conducted; Subcutaneous fat Exam;  [ Moderate  ]  Orbital fat pads region,  [ Mild  ]Buccal fat region,  [ Moderate  ]triceps region, [ WNL  ]ribs region.  Muscle Exam; [ Mild  ]temples region, [ WNL  ]clavicle region, [  Mild ]shoulder region, [ - ]Scapula region, [ Mild  ]Interosseous region, [ Mild  ]thigh region, [ Moderate  ]Calf region    Pertinent Medications: MEDICATIONS  (STANDING):  amLODIPine   Tablet 10 milliGRAM(s) Oral daily  aspirin  chewable 81 milliGRAM(s) Oral daily  atorvastatin 40 milliGRAM(s) Oral at bedtime  dextrose 5%. 1000 milliLiter(s) (100 mL/Hr) IV Continuous <Continuous>  dextrose 5%. 1000 milliLiter(s) (50 mL/Hr) IV Continuous <Continuous>  dextrose 50% Injectable 25 Gram(s) IV Push once  dextrose 50% Injectable 12.5 Gram(s) IV Push once  dextrose 50% Injectable 25 Gram(s) IV Push once  donepezil 10 milliGRAM(s) Oral at bedtime  enoxaparin Injectable 40 milliGRAM(s) SubCutaneous every 24 hours  gabapentin 100 milliGRAM(s) Oral three times a day  glucagon  Injectable 1 milliGRAM(s) IntraMuscular once  hydrALAZINE 25 milliGRAM(s) Oral every 8 hours  insulin glargine Injectable (LANTUS) 30 Unit(s) SubCutaneous at bedtime  insulin lispro (ADMELOG) corrective regimen sliding scale   SubCutaneous three times a day before meals  insulin lispro (ADMELOG) corrective regimen sliding scale   SubCutaneous at bedtime  insulin lispro Injectable (ADMELOG) 8 Unit(s) SubCutaneous three times a day before meals  lisinopril 20 milliGRAM(s) Oral daily  memantine 10 milliGRAM(s) Oral daily    MEDICATIONS  (PRN):  acetaminophen     Tablet .. 650 milliGRAM(s) Oral every 6 hours PRN Temp greater or equal to 38C (100.4F), Mild Pain (1 - 3)  aluminum hydroxide/magnesium hydroxide/simethicone Suspension 30 milliLiter(s) Oral every 4 hours PRN Dyspepsia  dextrose Oral Gel 15 Gram(s) Oral once PRN Blood Glucose LESS THAN 70 milliGRAM(s)/deciliter  melatonin 3 milliGRAM(s) Oral at bedtime PRN Insomnia  ondansetron Injectable 4 milliGRAM(s) IV Push every 8 hours PRN Nausea and/or Vomiting    Pertinent Labs: 04-25 Na143 mmol/L Glu 240 mg/dL<H> K+ 3.7 mmol/L Cr  0.80 mg/dL BUN 9 mg/dL 04-25 Phos 3.0 mg/dL 04-18 Chol 157 mg/dL LDL --    HDL 44 mg/dL<L> Trig 118 mg/dL  04-18-22 @ 08:43 a1c 9.8<H>   CAPILLARY BLOOD GLUCOSE      POCT Blood Glucose.: 208 mg/dL (27 Apr 2022 07:47)  POCT Blood Glucose.: 293 mg/dL (26 Apr 2022 21:41)  POCT Blood Glucose.: 138 mg/dL (26 Apr 2022 17:19)  POCT Blood Glucose.: 168 mg/dL (26 Apr 2022 12:45)  POCT Blood Glucose.: 192 mg/dL (26 Apr 2022 11:29)    Skin: WDL except ecchymotic     Estimated Needs:   [x ] no change since previous assessment(04/20)  [ ] recalculated:     Previous Nutrition Diagnosis: (04/20)  Malnutrition: moderate malnutrition in context of chronic illness  Etiology: impaired nutrient absorption in setting of uncontrolled diabetes  Signs/Symptoms: physical findings of mild/moderate fat muscle loss, A1C=9.8%  Goal/Expected Outcome: pt c >75% meal intake; met at present                                       older adult glucose goal 100-200 mg/dL; not met       Nutrition Diagnosis is [ x] ongoing  [ ] resolved [ ] not applicable     New Nutrition Diagnosis: [x ] not applicable     Interventions:   Recommend  [x] Continue c current diet regimen   [ ] Change Diet To:  [ ] Nutrition Supplement  [ ] Nutrition Support  [ x] Other: swallow evaluation   [x] Other:  Endocrine consult     Monitoring and Evaluation:   [x ] PO intake [ x ] Tolerance to diet prescription [ x ] weights [ x ] labs[ x ] follow up per protocol  [ ] other:

## 2022-04-27 NOTE — DISCHARGE NOTE PROVIDER - CARE PROVIDER_API CALL
Benjamín Arevalo)  Neurology  General  [address] [place] [id number]  Phone: [phone]  Fax: [phone]  Follow Up Time:

## 2022-04-27 NOTE — PROGRESS NOTE ADULT - SUBJECTIVE AND OBJECTIVE BOX
Patient is a 72y old  Female who presents with a chief complaint of Metabolic Encephalopathy (25 Apr 2022 13:57)      OVERNIGHT EVENTS:  Patients seen and examined at bedside this morning. No acute events overnight.    REVIEW OF SYSTEMS: denies chest pain/SOB, diaphoresis, no F/C, cough, dizziness, headache, blurry vision, nausea, vomiting, abdominal pain. All others review of systems negative     MEDICATIONS  (STANDING):  amLODIPine   Tablet 10 milliGRAM(s) Oral daily  aspirin  chewable 81 milliGRAM(s) Oral daily  atorvastatin 40 milliGRAM(s) Oral at bedtime  dextrose 5%. 1000 milliLiter(s) (100 mL/Hr) IV Continuous <Continuous>  dextrose 5%. 1000 milliLiter(s) (50 mL/Hr) IV Continuous <Continuous>  dextrose 50% Injectable 25 Gram(s) IV Push once  dextrose 50% Injectable 12.5 Gram(s) IV Push once  dextrose 50% Injectable 25 Gram(s) IV Push once  donepezil 10 milliGRAM(s) Oral at bedtime  enoxaparin Injectable 40 milliGRAM(s) SubCutaneous every 24 hours  gabapentin 100 milliGRAM(s) Oral three times a day  glucagon  Injectable 1 milliGRAM(s) IntraMuscular once  hydrALAZINE 25 milliGRAM(s) Oral every 8 hours  insulin glargine Injectable (LANTUS) 30 Unit(s) SubCutaneous at bedtime  insulin lispro (ADMELOG) corrective regimen sliding scale   SubCutaneous three times a day before meals  insulin lispro (ADMELOG) corrective regimen sliding scale   SubCutaneous at bedtime  insulin lispro Injectable (ADMELOG) 8 Unit(s) SubCutaneous three times a day before meals  lisinopril 20 milliGRAM(s) Oral daily  memantine 10 milliGRAM(s) Oral daily    MEDICATIONS  (PRN):  acetaminophen     Tablet .. 650 milliGRAM(s) Oral every 6 hours PRN Temp greater or equal to 38C (100.4F), Mild Pain (1 - 3)  aluminum hydroxide/magnesium hydroxide/simethicone Suspension 30 milliLiter(s) Oral every 4 hours PRN Dyspepsia  dextrose Oral Gel 15 Gram(s) Oral once PRN Blood Glucose LESS THAN 70 milliGRAM(s)/deciliter  melatonin 3 milliGRAM(s) Oral at bedtime PRN Insomnia  ondansetron Injectable 4 milliGRAM(s) IV Push every 8 hours PRN Nausea and/or Vomiting      Allergies    No Known Allergies    Intolerances        T(F): 97.7 (04-27-22 @ 11:20), Max: 98.9 (04-26-22 @ 16:25)  HR: 69 (04-27-22 @ 11:20) (69 - 100)  BP: 148/73 (04-27-22 @ 11:20) (133/75 - 148/73)  RR: 18 (04-27-22 @ 11:20) (16 - 18)  SpO2: 99% (04-27-22 @ 11:20) (97% - 100%)  Wt(kg): --    PHYSICAL EXAM:  GENERAL: NAD  HEAD:  Atraumatic, Normocephalic  EYES: PERRLA, conjunctiva and sclera clear  ENMT: Moist mucous membranes  NECK: Supple, No JVD, Normal thyroid  NERVOUS SYSTEM:  Alert & Awake  CHEST/LUNG: Clear to percussion bilaterally;   HEART: Regular rate and rhythm;   ABDOMEN: Soft, Nontender, Nondistended; Bowel sounds present  EXTREMITIES:  no edema BL LE  SKIN: moist    LABS:              Cultures;   CAPILLARY BLOOD GLUCOSE      POCT Blood Glucose.: 253 mg/dL (27 Apr 2022 11:00)  POCT Blood Glucose.: 208 mg/dL (27 Apr 2022 07:47)  POCT Blood Glucose.: 293 mg/dL (26 Apr 2022 21:41)  POCT Blood Glucose.: 138 mg/dL (26 Apr 2022 17:19)    Lipid panel:           RADIOLOGY & ADDITIONAL TESTS:    Imaging Personally Reviewed:  [x ] YES      Consultant(s) Notes Reviewed:  [x ] YES     Care Discussed with [x ] Consultants [X ] Patient [ ] Family  [x ]    [x ]  Other; RN

## 2022-04-27 NOTE — DISCHARGE NOTE PROVIDER - NSDCACTIVITY_GEN_ALL_CORE
Bedside and Verbal shift change report given to Tyrone becerra (oncoming nurse) by self (offgoing nurse). Report included the following information SBAR, Kardex, Intake/Output and MAR.      Heparin running at 18; verified with oncoming RN No restrictions

## 2022-04-28 LAB
ANION GAP SERPL CALC-SCNC: 6 MMOL/L — SIGNIFICANT CHANGE UP (ref 5–17)
BUN SERPL-MCNC: 13 MG/DL — SIGNIFICANT CHANGE UP (ref 7–23)
CALCIUM SERPL-MCNC: 9 MG/DL — SIGNIFICANT CHANGE UP (ref 8.5–10.1)
CHLORIDE SERPL-SCNC: 110 MMOL/L — HIGH (ref 96–108)
CO2 SERPL-SCNC: 24 MMOL/L — SIGNIFICANT CHANGE UP (ref 22–31)
CREAT SERPL-MCNC: 0.88 MG/DL — SIGNIFICANT CHANGE UP (ref 0.5–1.3)
EGFR: 69 ML/MIN/1.73M2 — SIGNIFICANT CHANGE UP
GLUCOSE BLDC GLUCOMTR-MCNC: 138 MG/DL — HIGH (ref 70–99)
GLUCOSE BLDC GLUCOMTR-MCNC: 157 MG/DL — HIGH (ref 70–99)
GLUCOSE BLDC GLUCOMTR-MCNC: 212 MG/DL — HIGH (ref 70–99)
GLUCOSE BLDC GLUCOMTR-MCNC: 284 MG/DL — HIGH (ref 70–99)
GLUCOSE SERPL-MCNC: 176 MG/DL — HIGH (ref 70–99)
HCT VFR BLD CALC: 36.4 % — SIGNIFICANT CHANGE UP (ref 34.5–45)
HGB BLD-MCNC: 11.9 G/DL — SIGNIFICANT CHANGE UP (ref 11.5–15.5)
MCHC RBC-ENTMCNC: 27.5 PG — SIGNIFICANT CHANGE UP (ref 27–34)
MCHC RBC-ENTMCNC: 32.7 G/DL — SIGNIFICANT CHANGE UP (ref 32–36)
MCV RBC AUTO: 84.3 FL — SIGNIFICANT CHANGE UP (ref 80–100)
NRBC # BLD: 0 /100 WBCS — SIGNIFICANT CHANGE UP (ref 0–0)
PLATELET # BLD AUTO: 296 K/UL — SIGNIFICANT CHANGE UP (ref 150–400)
POTASSIUM SERPL-MCNC: 3.8 MMOL/L — SIGNIFICANT CHANGE UP (ref 3.5–5.3)
POTASSIUM SERPL-SCNC: 3.8 MMOL/L — SIGNIFICANT CHANGE UP (ref 3.5–5.3)
RBC # BLD: 4.32 M/UL — SIGNIFICANT CHANGE UP (ref 3.8–5.2)
RBC # FLD: 13.4 % — SIGNIFICANT CHANGE UP (ref 10.3–14.5)
SODIUM SERPL-SCNC: 140 MMOL/L — SIGNIFICANT CHANGE UP (ref 135–145)
WBC # BLD: 7.44 K/UL — SIGNIFICANT CHANGE UP (ref 3.8–10.5)
WBC # FLD AUTO: 7.44 K/UL — SIGNIFICANT CHANGE UP (ref 3.8–10.5)

## 2022-04-28 PROCEDURE — 99233 SBSQ HOSP IP/OBS HIGH 50: CPT

## 2022-04-28 RX ORDER — NYSTATIN CREAM 100000 [USP'U]/G
1 CREAM TOPICAL THREE TIMES A DAY
Refills: 0 | Status: DISCONTINUED | OUTPATIENT
Start: 2022-04-28 | End: 2022-05-11

## 2022-04-28 RX ADMIN — Medication 81 MILLIGRAM(S): at 12:27

## 2022-04-28 RX ADMIN — Medication 8 UNIT(S): at 16:23

## 2022-04-28 RX ADMIN — GABAPENTIN 100 MILLIGRAM(S): 400 CAPSULE ORAL at 05:41

## 2022-04-28 RX ADMIN — NYSTATIN CREAM 1 APPLICATION(S): 100000 CREAM TOPICAL at 23:30

## 2022-04-28 RX ADMIN — INSULIN GLARGINE 30 UNIT(S): 100 INJECTION, SOLUTION SUBCUTANEOUS at 21:37

## 2022-04-28 RX ADMIN — Medication 8 UNIT(S): at 07:49

## 2022-04-28 RX ADMIN — LISINOPRIL 20 MILLIGRAM(S): 2.5 TABLET ORAL at 05:41

## 2022-04-28 RX ADMIN — ENOXAPARIN SODIUM 40 MILLIGRAM(S): 100 INJECTION SUBCUTANEOUS at 12:27

## 2022-04-28 RX ADMIN — Medication 3: at 12:22

## 2022-04-28 RX ADMIN — Medication 2: at 07:49

## 2022-04-28 RX ADMIN — ATORVASTATIN CALCIUM 40 MILLIGRAM(S): 80 TABLET, FILM COATED ORAL at 21:12

## 2022-04-28 RX ADMIN — DONEPEZIL HYDROCHLORIDE 10 MILLIGRAM(S): 10 TABLET, FILM COATED ORAL at 21:11

## 2022-04-28 RX ADMIN — MEMANTINE HYDROCHLORIDE 10 MILLIGRAM(S): 10 TABLET ORAL at 12:27

## 2022-04-28 RX ADMIN — Medication 8 UNIT(S): at 12:22

## 2022-04-28 RX ADMIN — Medication 1: at 16:25

## 2022-04-28 RX ADMIN — AMLODIPINE BESYLATE 10 MILLIGRAM(S): 2.5 TABLET ORAL at 05:41

## 2022-04-28 RX ADMIN — GABAPENTIN 100 MILLIGRAM(S): 400 CAPSULE ORAL at 21:11

## 2022-04-28 RX ADMIN — GABAPENTIN 100 MILLIGRAM(S): 400 CAPSULE ORAL at 14:41

## 2022-04-28 NOTE — PROGRESS NOTE ADULT - SUBJECTIVE AND OBJECTIVE BOX
Patient is a 72y old  Female who presents with a chief complaint of Metabolic Encephalopathy (25 Apr 2022 13:57)      OVERNIGHT EVENTS:  Patients seen and examined at bedside this morning. No acute events overnight.    REVIEW OF SYSTEMS: denies chest pain/SOB, diaphoresis, no F/C, cough, dizziness, headache, blurry vision, nausea, vomiting, abdominal pain. All others review of systems negative     MEDICATIONS  (STANDING):  amLODIPine   Tablet 10 milliGRAM(s) Oral daily  aspirin  chewable 81 milliGRAM(s) Oral daily  atorvastatin 40 milliGRAM(s) Oral at bedtime  dextrose 5%. 1000 milliLiter(s) (50 mL/Hr) IV Continuous <Continuous>  dextrose 5%. 1000 milliLiter(s) (100 mL/Hr) IV Continuous <Continuous>  dextrose 50% Injectable 25 Gram(s) IV Push once  dextrose 50% Injectable 12.5 Gram(s) IV Push once  dextrose 50% Injectable 25 Gram(s) IV Push once  donepezil 10 milliGRAM(s) Oral at bedtime  enoxaparin Injectable 40 milliGRAM(s) SubCutaneous every 24 hours  gabapentin 100 milliGRAM(s) Oral three times a day  glucagon  Injectable 1 milliGRAM(s) IntraMuscular once  hydrALAZINE 25 milliGRAM(s) Oral every 8 hours  insulin glargine Injectable (LANTUS) 30 Unit(s) SubCutaneous at bedtime  insulin lispro (ADMELOG) corrective regimen sliding scale   SubCutaneous three times a day before meals  insulin lispro (ADMELOG) corrective regimen sliding scale   SubCutaneous at bedtime  insulin lispro Injectable (ADMELOG) 8 Unit(s) SubCutaneous three times a day before meals  lisinopril 20 milliGRAM(s) Oral daily  memantine 10 milliGRAM(s) Oral daily    MEDICATIONS  (PRN):  acetaminophen     Tablet .. 650 milliGRAM(s) Oral every 6 hours PRN Temp greater or equal to 38C (100.4F), Mild Pain (1 - 3)  aluminum hydroxide/magnesium hydroxide/simethicone Suspension 30 milliLiter(s) Oral every 4 hours PRN Dyspepsia  dextrose Oral Gel 15 Gram(s) Oral once PRN Blood Glucose LESS THAN 70 milliGRAM(s)/deciliter  melatonin 3 milliGRAM(s) Oral at bedtime PRN Insomnia  ondansetron Injectable 4 milliGRAM(s) IV Push every 8 hours PRN Nausea and/or Vomiting      Allergies    No Known Allergies    Intolerances        T(F): 97.4 (04-28-22 @ 11:20), Max: 98.5 (04-27-22 @ 18:47)  HR: 86 (04-28-22 @ 14:31) (65 - 95)  BP: 140/75 (04-28-22 @ 14:31) (129/76 - 158/82)  RR: 19 (04-28-22 @ 11:20) (18 - 19)  SpO2: 100% (04-28-22 @ 11:20) (97% - 100%)  Wt(kg): --    PHYSICAL EXAM:  GENERAL: NAD  HEAD:  Atraumatic, Normocephalic  EYES: PERRLA, conjunctiva and sclera clear  ENMT: Moist mucous membranes  NECK: Supple, No JVD, Normal thyroid  NERVOUS SYSTEM:  Alert & Awake  CHEST/LUNG: Clear to percussion bilaterally;   HEART: Regular rate and rhythm;   ABDOMEN: Soft, Nontender, Nondistended; Bowel sounds present  EXTREMITIES:  no edema BL LE  SKIN: moist    LABS:                        11.9   7.44  )-----------( 296      ( 28 Apr 2022 06:28 )             36.4     04-28    140  |  110<H>  |  13  ----------------------------<  176<H>  3.8   |  24  |  0.88    Ca    9.0      28 Apr 2022 06:28          Cultures;   CAPILLARY BLOOD GLUCOSE      POCT Blood Glucose.: 284 mg/dL (28 Apr 2022 11:46)  POCT Blood Glucose.: 212 mg/dL (28 Apr 2022 07:33)  POCT Blood Glucose.: 186 mg/dL (27 Apr 2022 21:51)  POCT Blood Glucose.: 189 mg/dL (27 Apr 2022 17:16)    Lipid panel:           RADIOLOGY & ADDITIONAL TESTS:    Imaging Personally Reviewed:  [x ] YES      Consultant(s) Notes Reviewed:  [x ] YES     Care Discussed with [x ] Consultants [X ] Patient [ ] Family  [x ]    [x ]  Other; RN

## 2022-04-29 LAB
ANION GAP SERPL CALC-SCNC: 7 MMOL/L — SIGNIFICANT CHANGE UP (ref 5–17)
BUN SERPL-MCNC: 12 MG/DL — SIGNIFICANT CHANGE UP (ref 7–23)
CALCIUM SERPL-MCNC: 8.8 MG/DL — SIGNIFICANT CHANGE UP (ref 8.5–10.1)
CHLORIDE SERPL-SCNC: 110 MMOL/L — HIGH (ref 96–108)
CO2 SERPL-SCNC: 25 MMOL/L — SIGNIFICANT CHANGE UP (ref 22–31)
CREAT SERPL-MCNC: 0.91 MG/DL — SIGNIFICANT CHANGE UP (ref 0.5–1.3)
EGFR: 66 ML/MIN/1.73M2 — SIGNIFICANT CHANGE UP
FLUAV AG NPH QL: SIGNIFICANT CHANGE UP
FLUBV AG NPH QL: SIGNIFICANT CHANGE UP
GLUCOSE BLDC GLUCOMTR-MCNC: 160 MG/DL — HIGH (ref 70–99)
GLUCOSE BLDC GLUCOMTR-MCNC: 226 MG/DL — HIGH (ref 70–99)
GLUCOSE BLDC GLUCOMTR-MCNC: 274 MG/DL — HIGH (ref 70–99)
GLUCOSE BLDC GLUCOMTR-MCNC: 332 MG/DL — HIGH (ref 70–99)
GLUCOSE SERPL-MCNC: 177 MG/DL — HIGH (ref 70–99)
HCT VFR BLD CALC: 38.3 % — SIGNIFICANT CHANGE UP (ref 34.5–45)
HGB BLD-MCNC: 12.6 G/DL — SIGNIFICANT CHANGE UP (ref 11.5–15.5)
MCHC RBC-ENTMCNC: 27.6 PG — SIGNIFICANT CHANGE UP (ref 27–34)
MCHC RBC-ENTMCNC: 32.9 G/DL — SIGNIFICANT CHANGE UP (ref 32–36)
MCV RBC AUTO: 84 FL — SIGNIFICANT CHANGE UP (ref 80–100)
NRBC # BLD: 0 /100 WBCS — SIGNIFICANT CHANGE UP (ref 0–0)
PLATELET # BLD AUTO: 286 K/UL — SIGNIFICANT CHANGE UP (ref 150–400)
POTASSIUM SERPL-MCNC: 3.7 MMOL/L — SIGNIFICANT CHANGE UP (ref 3.5–5.3)
POTASSIUM SERPL-SCNC: 3.7 MMOL/L — SIGNIFICANT CHANGE UP (ref 3.5–5.3)
RBC # BLD: 4.56 M/UL — SIGNIFICANT CHANGE UP (ref 3.8–5.2)
RBC # FLD: 13.4 % — SIGNIFICANT CHANGE UP (ref 10.3–14.5)
SARS-COV-2 RNA SPEC QL NAA+PROBE: SIGNIFICANT CHANGE UP
SODIUM SERPL-SCNC: 142 MMOL/L — SIGNIFICANT CHANGE UP (ref 135–145)
WBC # BLD: 6.34 K/UL — SIGNIFICANT CHANGE UP (ref 3.8–10.5)
WBC # FLD AUTO: 6.34 K/UL — SIGNIFICANT CHANGE UP (ref 3.8–10.5)

## 2022-04-29 PROCEDURE — 99232 SBSQ HOSP IP/OBS MODERATE 35: CPT

## 2022-04-29 RX ORDER — HYDROXYZINE HCL 10 MG
25 TABLET ORAL
Refills: 0 | Status: DISCONTINUED | OUTPATIENT
Start: 2022-04-29 | End: 2022-05-11

## 2022-04-29 RX ADMIN — Medication 25 MILLIGRAM(S): at 15:00

## 2022-04-29 RX ADMIN — INSULIN GLARGINE 30 UNIT(S): 100 INJECTION, SOLUTION SUBCUTANEOUS at 22:04

## 2022-04-29 RX ADMIN — Medication 8 UNIT(S): at 07:56

## 2022-04-29 RX ADMIN — Medication 25 MILLIGRAM(S): at 05:21

## 2022-04-29 RX ADMIN — GABAPENTIN 100 MILLIGRAM(S): 400 CAPSULE ORAL at 13:53

## 2022-04-29 RX ADMIN — ATORVASTATIN CALCIUM 40 MILLIGRAM(S): 80 TABLET, FILM COATED ORAL at 22:04

## 2022-04-29 RX ADMIN — NYSTATIN CREAM 1 APPLICATION(S): 100000 CREAM TOPICAL at 05:24

## 2022-04-29 RX ADMIN — AMLODIPINE BESYLATE 10 MILLIGRAM(S): 2.5 TABLET ORAL at 05:22

## 2022-04-29 RX ADMIN — Medication 4: at 16:33

## 2022-04-29 RX ADMIN — Medication 8 UNIT(S): at 16:34

## 2022-04-29 RX ADMIN — GABAPENTIN 100 MILLIGRAM(S): 400 CAPSULE ORAL at 22:04

## 2022-04-29 RX ADMIN — MEMANTINE HYDROCHLORIDE 10 MILLIGRAM(S): 10 TABLET ORAL at 14:11

## 2022-04-29 RX ADMIN — Medication 3: at 11:57

## 2022-04-29 RX ADMIN — NYSTATIN CREAM 1 APPLICATION(S): 100000 CREAM TOPICAL at 14:11

## 2022-04-29 RX ADMIN — NYSTATIN CREAM 1 APPLICATION(S): 100000 CREAM TOPICAL at 22:05

## 2022-04-29 RX ADMIN — LISINOPRIL 20 MILLIGRAM(S): 2.5 TABLET ORAL at 05:21

## 2022-04-29 RX ADMIN — ENOXAPARIN SODIUM 40 MILLIGRAM(S): 100 INJECTION SUBCUTANEOUS at 12:03

## 2022-04-29 RX ADMIN — Medication 1: at 07:55

## 2022-04-29 RX ADMIN — Medication 8 UNIT(S): at 11:58

## 2022-04-29 RX ADMIN — DONEPEZIL HYDROCHLORIDE 10 MILLIGRAM(S): 10 TABLET, FILM COATED ORAL at 22:04

## 2022-04-29 RX ADMIN — Medication 81 MILLIGRAM(S): at 12:43

## 2022-04-29 RX ADMIN — GABAPENTIN 100 MILLIGRAM(S): 400 CAPSULE ORAL at 05:21

## 2022-04-29 NOTE — PROGRESS NOTE ADULT - SUBJECTIVE AND OBJECTIVE BOX
Patient is a 72y old  Female who presents with a chief complaint of Metabolic Encephalopathy (25 Apr 2022 13:57)      OVERNIGHT EVENTS:  Patients seen and examined at bedside this morning. No acute events overnight.    REVIEW OF SYSTEMS: denies chest pain/SOB, diaphoresis, no F/C, cough, dizziness, headache, blurry vision, nausea, vomiting, abdominal pain. All others review of systems negative     MEDICATIONS  (STANDING):  amLODIPine   Tablet 10 milliGRAM(s) Oral daily  aspirin  chewable 81 milliGRAM(s) Oral daily  atorvastatin 40 milliGRAM(s) Oral at bedtime  dextrose 5%. 1000 milliLiter(s) (50 mL/Hr) IV Continuous <Continuous>  dextrose 5%. 1000 milliLiter(s) (100 mL/Hr) IV Continuous <Continuous>  dextrose 50% Injectable 25 Gram(s) IV Push once  dextrose 50% Injectable 12.5 Gram(s) IV Push once  dextrose 50% Injectable 25 Gram(s) IV Push once  donepezil 10 milliGRAM(s) Oral at bedtime  enoxaparin Injectable 40 milliGRAM(s) SubCutaneous every 24 hours  gabapentin 100 milliGRAM(s) Oral three times a day  glucagon  Injectable 1 milliGRAM(s) IntraMuscular once  hydrALAZINE 25 milliGRAM(s) Oral every 8 hours  hydrOXYzine hydrochloride 25 milliGRAM(s) Oral two times a day  insulin glargine Injectable (LANTUS) 30 Unit(s) SubCutaneous at bedtime  insulin lispro (ADMELOG) corrective regimen sliding scale   SubCutaneous three times a day before meals  insulin lispro (ADMELOG) corrective regimen sliding scale   SubCutaneous at bedtime  insulin lispro Injectable (ADMELOG) 8 Unit(s) SubCutaneous three times a day before meals  lisinopril 20 milliGRAM(s) Oral daily  memantine 10 milliGRAM(s) Oral daily  nystatin Powder 1 Application(s) Topical three times a day    MEDICATIONS  (PRN):  acetaminophen     Tablet .. 650 milliGRAM(s) Oral every 6 hours PRN Temp greater or equal to 38C (100.4F), Mild Pain (1 - 3)  aluminum hydroxide/magnesium hydroxide/simethicone Suspension 30 milliLiter(s) Oral every 4 hours PRN Dyspepsia  dextrose Oral Gel 15 Gram(s) Oral once PRN Blood Glucose LESS THAN 70 milliGRAM(s)/deciliter  melatonin 3 milliGRAM(s) Oral at bedtime PRN Insomnia  ondansetron Injectable 4 milliGRAM(s) IV Push every 8 hours PRN Nausea and/or Vomiting      Allergies    No Known Allergies    Intolerances        T(F): 97.9 (04-29-22 @ 12:15), Max: 98.3 (04-28-22 @ 16:43)  HR: 99 (04-29-22 @ 12:15) (71 - 99)  BP: 113/76 (04-29-22 @ 12:15) (113/76 - 155/72)  RR: 18 (04-29-22 @ 12:15) (18 - 19)  SpO2: 97% (04-29-22 @ 12:15) (97% - 100%)  Wt(kg): --    PHYSICAL EXAM:  GENERAL: NAD  HEAD:  Atraumatic, Normocephalic  EYES: PERRLA, conjunctiva and sclera clear  ENMT: Moist mucous membranes  NECK: Supple, No JVD, Normal thyroid  NERVOUS SYSTEM:  Alert & Awake  CHEST/LUNG: Clear to percussion bilaterally;   HEART: Regular rate and rhythm;   ABDOMEN: Soft, Nontender, Nondistended; Bowel sounds present  EXTREMITIES:  no edema BL LE  SKIN: moist    LABS:                        12.6   6.34  )-----------( 286      ( 29 Apr 2022 07:01 )             38.3     04-29    142  |  110<H>  |  12  ----------------------------<  177<H>  3.7   |  25  |  0.91    Ca    8.8      29 Apr 2022 07:01          Cultures;   CAPILLARY BLOOD GLUCOSE      POCT Blood Glucose.: 274 mg/dL (29 Apr 2022 11:12)  POCT Blood Glucose.: 160 mg/dL (29 Apr 2022 07:51)  POCT Blood Glucose.: 138 mg/dL (28 Apr 2022 21:33)  POCT Blood Glucose.: 157 mg/dL (28 Apr 2022 16:09)    Lipid panel:           RADIOLOGY & ADDITIONAL TESTS:    Imaging Personally Reviewed:  [x ] YES      Consultant(s) Notes Reviewed:  [x ] YES     Care Discussed with [x ] Consultants [X ] Patient [ ] Family  [x ]    [x ]  Other; RN

## 2022-04-30 LAB — GLUCOSE BLDC GLUCOMTR-MCNC: 129 MG/DL — HIGH (ref 70–99)

## 2022-04-30 PROCEDURE — 99232 SBSQ HOSP IP/OBS MODERATE 35: CPT

## 2022-04-30 RX ADMIN — ATORVASTATIN CALCIUM 40 MILLIGRAM(S): 80 TABLET, FILM COATED ORAL at 22:05

## 2022-04-30 RX ADMIN — Medication 25 MILLIGRAM(S): at 14:10

## 2022-04-30 RX ADMIN — GABAPENTIN 100 MILLIGRAM(S): 400 CAPSULE ORAL at 22:05

## 2022-04-30 RX ADMIN — Medication 8 UNIT(S): at 16:54

## 2022-04-30 RX ADMIN — ENOXAPARIN SODIUM 40 MILLIGRAM(S): 100 INJECTION SUBCUTANEOUS at 12:04

## 2022-04-30 RX ADMIN — LISINOPRIL 20 MILLIGRAM(S): 2.5 TABLET ORAL at 06:08

## 2022-04-30 RX ADMIN — Medication 1: at 22:04

## 2022-04-30 RX ADMIN — Medication 8 UNIT(S): at 08:17

## 2022-04-30 RX ADMIN — INSULIN GLARGINE 30 UNIT(S): 100 INJECTION, SOLUTION SUBCUTANEOUS at 22:03

## 2022-04-30 RX ADMIN — Medication 2: at 12:03

## 2022-04-30 RX ADMIN — NYSTATIN CREAM 1 APPLICATION(S): 100000 CREAM TOPICAL at 06:07

## 2022-04-30 RX ADMIN — Medication 25 MILLIGRAM(S): at 17:37

## 2022-04-30 RX ADMIN — Medication 81 MILLIGRAM(S): at 12:04

## 2022-04-30 RX ADMIN — AMLODIPINE BESYLATE 10 MILLIGRAM(S): 2.5 TABLET ORAL at 06:08

## 2022-04-30 RX ADMIN — Medication 8 UNIT(S): at 12:04

## 2022-04-30 RX ADMIN — GABAPENTIN 100 MILLIGRAM(S): 400 CAPSULE ORAL at 14:09

## 2022-04-30 RX ADMIN — NYSTATIN CREAM 1 APPLICATION(S): 100000 CREAM TOPICAL at 22:07

## 2022-04-30 RX ADMIN — Medication 25 MILLIGRAM(S): at 06:08

## 2022-04-30 RX ADMIN — DONEPEZIL HYDROCHLORIDE 10 MILLIGRAM(S): 10 TABLET, FILM COATED ORAL at 22:05

## 2022-04-30 RX ADMIN — Medication 1: at 16:53

## 2022-04-30 RX ADMIN — NYSTATIN CREAM 1 APPLICATION(S): 100000 CREAM TOPICAL at 16:49

## 2022-04-30 RX ADMIN — GABAPENTIN 100 MILLIGRAM(S): 400 CAPSULE ORAL at 06:08

## 2022-04-30 RX ADMIN — MEMANTINE HYDROCHLORIDE 10 MILLIGRAM(S): 10 TABLET ORAL at 12:04

## 2022-04-30 NOTE — PROGRESS NOTE ADULT - SUBJECTIVE AND OBJECTIVE BOX
Patient is a 72y old  Female who presents with a chief complaint of Metabolic Encephalopathy (25 Apr 2022 13:57)    INTERVAL HPI/OVERNIGHT EVENTS:    MEDICATIONS  (STANDING):  amLODIPine   Tablet 10 milliGRAM(s) Oral daily  aspirin  chewable 81 milliGRAM(s) Oral daily  atorvastatin 40 milliGRAM(s) Oral at bedtime  dextrose 5%. 1000 milliLiter(s) (50 mL/Hr) IV Continuous <Continuous>  dextrose 5%. 1000 milliLiter(s) (100 mL/Hr) IV Continuous <Continuous>  dextrose 50% Injectable 25 Gram(s) IV Push once  dextrose 50% Injectable 12.5 Gram(s) IV Push once  dextrose 50% Injectable 25 Gram(s) IV Push once  donepezil 10 milliGRAM(s) Oral at bedtime  enoxaparin Injectable 40 milliGRAM(s) SubCutaneous every 24 hours  gabapentin 100 milliGRAM(s) Oral three times a day  glucagon  Injectable 1 milliGRAM(s) IntraMuscular once  hydrALAZINE 25 milliGRAM(s) Oral every 8 hours  hydrOXYzine hydrochloride 25 milliGRAM(s) Oral two times a day  insulin glargine Injectable (LANTUS) 30 Unit(s) SubCutaneous at bedtime  insulin lispro (ADMELOG) corrective regimen sliding scale   SubCutaneous three times a day before meals  insulin lispro (ADMELOG) corrective regimen sliding scale   SubCutaneous at bedtime  insulin lispro Injectable (ADMELOG) 8 Unit(s) SubCutaneous three times a day before meals  lisinopril 20 milliGRAM(s) Oral daily  memantine 10 milliGRAM(s) Oral daily  nystatin Powder 1 Application(s) Topical three times a day    MEDICATIONS  (PRN):  acetaminophen     Tablet .. 650 milliGRAM(s) Oral every 6 hours PRN Temp greater or equal to 38C (100.4F), Mild Pain (1 - 3)  aluminum hydroxide/magnesium hydroxide/simethicone Suspension 30 milliLiter(s) Oral every 4 hours PRN Dyspepsia  dextrose Oral Gel 15 Gram(s) Oral once PRN Blood Glucose LESS THAN 70 milliGRAM(s)/deciliter  melatonin 3 milliGRAM(s) Oral at bedtime PRN Insomnia  ondansetron Injectable 4 milliGRAM(s) IV Push every 8 hours PRN Nausea and/or Vomiting    Allergies    No Known Allergies    Intolerances      REVIEW OF SYSTEMS:  All other systems reviewed and are negative    Vital Signs Last 24 Hrs  T(C): 37.1 (30 Apr 2022 10:59), Max: 37.1 (30 Apr 2022 10:59)  T(F): 98.7 (30 Apr 2022 10:59), Max: 98.7 (30 Apr 2022 10:59)  HR: 97 (30 Apr 2022 10:59) (72 - 111)  BP: 147/73 (30 Apr 2022 10:59) (113/76 - 147/73)  BP(mean): --  RR: 18 (30 Apr 2022 10:59) (17 - 18)  SpO2: 100% (30 Apr 2022 10:59) (97% - 100%)  Daily     Daily   I&O's Summary    29 Apr 2022 07:01  -  30 Apr 2022 07:00  --------------------------------------------------------  IN: 240 mL / OUT: 0 mL / NET: 240 mL      CAPILLARY BLOOD GLUCOSE      POCT Blood Glucose.: 129 mg/dL (30 Apr 2022 08:04)  POCT Blood Glucose.: 226 mg/dL (29 Apr 2022 21:36)  POCT Blood Glucose.: 332 mg/dL (29 Apr 2022 16:27)  POCT Blood Glucose.: 274 mg/dL (29 Apr 2022 11:12)    PHYSICAL EXAM:  GENERAL: NAD  HEAD:  Atraumatic, Normocephalic  EYES: PERRLA, conjunctiva and sclera clear  ENMT: Moist mucous membranes  NECK: Supple, No JVD, Normal thyroid  NERVOUS SYSTEM:  Alert & Awake  CHEST/LUNG: Clear to percussion bilaterally;   HEART: Regular rate and rhythm;   ABDOMEN: Soft, Nontender, Nondistended; Bowel sounds present  EXTREMITIES:  no edema BL LE  SKIN: moist  Labs                          12.6   6.34  )-----------( 286      ( 29 Apr 2022 07:01 )             38.3     04-29    142  |  110<H>  |  12  ----------------------------<  177<H>  3.7   |  25  |  0.91    Ca    8.8      29 Apr 2022 07:01                          DVT prophylaxis: > Lovenox 40mg SQ daily  > Heparin   > SCD's

## 2022-04-30 NOTE — PROGRESS NOTE ADULT - ASSESSMENT
72 YOF PMHx dementia, HTN, IDDMII presenting with worsened AMS.    Acute Metabolic encephalopathy due a progressive decline of alzheimer's vs frontaltemporal dementia  daughter reports patient has been seen by x neurologists and was tried on aricept and namenda in hopes it would slow the decline  Neurology appreciated.  we cannot rule out polypharmacy.   No signs of infectious cause.   MRI with artifact although no concerning signs of CVA  extensive counceling given to patients daughter . she understands patients progression    Hyponatremia, related to HCTZ  resolved    HTNive urgency   lisinopril 20mg daily    IDDM type II  sliding scale    Dysphagia: diet puree, mod thick, speech eval.    Moderate protein-calorie malnutrition.  Diet Regular-  Consistent Carbohydrate {Evening Snack}  Pureed (PUREED)  Moderately Thick Liquids (MODTHICKLIQS)    vte prophy: lovenox    dispo PT recommending SNF, family agreeable. SW on case looking for facilities. Family unable to care for patient  Spoke to family today asking to try and persue aminata and julián

## 2022-05-01 PROCEDURE — 99232 SBSQ HOSP IP/OBS MODERATE 35: CPT

## 2022-05-01 RX ADMIN — Medication 3: at 16:15

## 2022-05-01 RX ADMIN — NYSTATIN CREAM 1 APPLICATION(S): 100000 CREAM TOPICAL at 16:10

## 2022-05-01 RX ADMIN — ATORVASTATIN CALCIUM 40 MILLIGRAM(S): 80 TABLET, FILM COATED ORAL at 22:01

## 2022-05-01 RX ADMIN — Medication 25 MILLIGRAM(S): at 17:18

## 2022-05-01 RX ADMIN — GABAPENTIN 100 MILLIGRAM(S): 400 CAPSULE ORAL at 16:09

## 2022-05-01 RX ADMIN — INSULIN GLARGINE 30 UNIT(S): 100 INJECTION, SOLUTION SUBCUTANEOUS at 22:00

## 2022-05-01 RX ADMIN — GABAPENTIN 100 MILLIGRAM(S): 400 CAPSULE ORAL at 22:02

## 2022-05-01 RX ADMIN — AMLODIPINE BESYLATE 10 MILLIGRAM(S): 2.5 TABLET ORAL at 05:46

## 2022-05-01 RX ADMIN — Medication 8 UNIT(S): at 11:41

## 2022-05-01 RX ADMIN — Medication 8 UNIT(S): at 08:11

## 2022-05-01 RX ADMIN — GABAPENTIN 100 MILLIGRAM(S): 400 CAPSULE ORAL at 05:46

## 2022-05-01 RX ADMIN — MEMANTINE HYDROCHLORIDE 10 MILLIGRAM(S): 10 TABLET ORAL at 12:50

## 2022-05-01 RX ADMIN — Medication 25 MILLIGRAM(S): at 05:45

## 2022-05-01 RX ADMIN — Medication 81 MILLIGRAM(S): at 12:50

## 2022-05-01 RX ADMIN — NYSTATIN CREAM 1 APPLICATION(S): 100000 CREAM TOPICAL at 22:03

## 2022-05-01 RX ADMIN — LISINOPRIL 20 MILLIGRAM(S): 2.5 TABLET ORAL at 05:47

## 2022-05-01 RX ADMIN — DONEPEZIL HYDROCHLORIDE 10 MILLIGRAM(S): 10 TABLET, FILM COATED ORAL at 22:02

## 2022-05-01 RX ADMIN — ENOXAPARIN SODIUM 40 MILLIGRAM(S): 100 INJECTION SUBCUTANEOUS at 12:50

## 2022-05-01 RX ADMIN — NYSTATIN CREAM 1 APPLICATION(S): 100000 CREAM TOPICAL at 05:47

## 2022-05-01 RX ADMIN — Medication 8 UNIT(S): at 16:16

## 2022-05-01 RX ADMIN — Medication 3: at 11:41

## 2022-05-01 NOTE — PROGRESS NOTE ADULT - SUBJECTIVE AND OBJECTIVE BOX
Patient is a 72y old  Female who presents with a chief complaint of Metabolic Encephalopathy (2022 13:57)    INTERVAL HPI/OVERNIGHT EVENTS: no events     MEDICATIONS  (STANDING):  amLODIPine   Tablet 10 milliGRAM(s) Oral daily  aspirin  chewable 81 milliGRAM(s) Oral daily  atorvastatin 40 milliGRAM(s) Oral at bedtime  dextrose 5%. 1000 milliLiter(s) (100 mL/Hr) IV Continuous <Continuous>  dextrose 5%. 1000 milliLiter(s) (50 mL/Hr) IV Continuous <Continuous>  dextrose 50% Injectable 25 Gram(s) IV Push once  dextrose 50% Injectable 12.5 Gram(s) IV Push once  dextrose 50% Injectable 25 Gram(s) IV Push once  donepezil 10 milliGRAM(s) Oral at bedtime  enoxaparin Injectable 40 milliGRAM(s) SubCutaneous every 24 hours  gabapentin 100 milliGRAM(s) Oral three times a day  glucagon  Injectable 1 milliGRAM(s) IntraMuscular once  hydrALAZINE 25 milliGRAM(s) Oral every 8 hours  hydrOXYzine hydrochloride 25 milliGRAM(s) Oral two times a day  insulin glargine Injectable (LANTUS) 30 Unit(s) SubCutaneous at bedtime  insulin lispro (ADMELOG) corrective regimen sliding scale   SubCutaneous at bedtime  insulin lispro (ADMELOG) corrective regimen sliding scale   SubCutaneous three times a day before meals  insulin lispro Injectable (ADMELOG) 8 Unit(s) SubCutaneous three times a day before meals  lisinopril 20 milliGRAM(s) Oral daily  memantine 10 milliGRAM(s) Oral daily  nystatin Powder 1 Application(s) Topical three times a day    MEDICATIONS  (PRN):  acetaminophen     Tablet .. 650 milliGRAM(s) Oral every 6 hours PRN Temp greater or equal to 38C (100.4F), Mild Pain (1 - 3)  aluminum hydroxide/magnesium hydroxide/simethicone Suspension 30 milliLiter(s) Oral every 4 hours PRN Dyspepsia  dextrose Oral Gel 15 Gram(s) Oral once PRN Blood Glucose LESS THAN 70 milliGRAM(s)/deciliter  melatonin 3 milliGRAM(s) Oral at bedtime PRN Insomnia  ondansetron Injectable 4 milliGRAM(s) IV Push every 8 hours PRN Nausea and/or Vomiting    Allergies    No Known Allergies    Intolerances      REVIEW OF SYSTEMS:  All other systems reviewed and are negative    Vital Signs Last 24 Hrs  T(C): 36.2 (01 May 2022 05:00), Max: 37.1 (2022 10:59)  T(F): 97.2 (01 May 2022 05:00), Max: 98.7 (2022 10:59)  HR: 75 (01 May 2022 05:00) (75 - 97)  BP: 144/66 (01 May 2022 05:00) (125/78 - 151/74)  BP(mean): --  RR: 17 (01 May 2022 05:00) (16 - 18)  SpO2: 100% (01 May 2022 05:00) (98% - 100%)  Daily     Daily Weight in k.4 (01 May 2022 05:00)  I&O's Summary    2022 07:01  -  01 May 2022 07:00  --------------------------------------------------------  IN: 0 mL / OUT: 300 mL / NET: -300 mL      CAPILLARY BLOOD GLUCOSE      POCT Blood Glucose.: 129 mg/dL (01 May 2022 07:56)  POCT Blood Glucose.: 263 mg/dL (2022 21:15)  POCT Blood Glucose.: 172 mg/dL (2022 16:07)  POCT Blood Glucose.: 250 mg/dL (2022 11:56)    PHYSICAL EXAM:  GENERAL: NAD  HEAD:  Atraumatic, Normocephalic  EYES: PERRLA, conjunctiva and sclera clear  ENMT: Moist mucous membranes  NECK: Supple, No JVD, Normal thyroid  NERVOUS SYSTEM:  Alert & Awake  CHEST/LUNG: Clear to percussion bilaterally;   HEART: Regular rate and rhythm;   ABDOMEN: Soft, Nontender, Nondistended; Bowel sounds present  EXTREMITIES:  no edema BL LE  SKIN: moist  Labs                                DVT prophylaxis: > Lovenox 40mg SQ daily  > Heparin   > SCD's

## 2022-05-01 NOTE — PROGRESS NOTE ADULT - ASSESSMENT
72 YOF PMHx dementia, HTN, IDDMII presenting with worsened AMS.    Acute Metabolic encephalopathy due a progressive decline of alzheimer's vs frontaltemporal dementia  daughter reports patient has been seen by x neurologists and was tried on aricept and namenda in hopes it would slow the decline  Neurology appreciated.  we cannot rule out polypharmacy.   No signs of infectious cause.   MRI with artifact although no concerning signs of CVA  extensive counceling given to patients daughter . she understands patients progression    Hyponatremia, related to HCTZ  resolved    HTNive urgency   lisinopril 20mg daily    IDDM type II  sliding scale    Dysphagia: diet puree, mod thick, speech eval.    Moderate protein-calorie malnutrition.  Diet Regular-  Consistent Carbohydrate {Evening Snack}  Pureed (PUREED)  Moderately Thick Liquids (MODTHICKLIQS)    vte prophy: lovenox    dispo PT recommending SNF, family agreeable. SW on case looking for facilities. Family unable to care for patient    Pt accepted to facility awaiting insurance appeal, f/u with SW

## 2022-05-02 LAB — GLUCOSE BLDC GLUCOMTR-MCNC: 302 MG/DL — HIGH (ref 70–99)

## 2022-05-02 PROCEDURE — 99232 SBSQ HOSP IP/OBS MODERATE 35: CPT

## 2022-05-02 RX ORDER — FOLIC ACID 0.8 MG
2 TABLET ORAL DAILY
Refills: 0 | Status: DISCONTINUED | OUTPATIENT
Start: 2022-05-02 | End: 2022-05-11

## 2022-05-02 RX ADMIN — INSULIN GLARGINE 30 UNIT(S): 100 INJECTION, SOLUTION SUBCUTANEOUS at 21:18

## 2022-05-02 RX ADMIN — NYSTATIN CREAM 1 APPLICATION(S): 100000 CREAM TOPICAL at 17:14

## 2022-05-02 RX ADMIN — Medication 8 UNIT(S): at 12:19

## 2022-05-02 RX ADMIN — Medication 25 MILLIGRAM(S): at 05:37

## 2022-05-02 RX ADMIN — AMLODIPINE BESYLATE 10 MILLIGRAM(S): 2.5 TABLET ORAL at 05:37

## 2022-05-02 RX ADMIN — NYSTATIN CREAM 1 APPLICATION(S): 100000 CREAM TOPICAL at 21:18

## 2022-05-02 RX ADMIN — Medication 25 MILLIGRAM(S): at 21:16

## 2022-05-02 RX ADMIN — Medication 1: at 08:26

## 2022-05-02 RX ADMIN — NYSTATIN CREAM 1 APPLICATION(S): 100000 CREAM TOPICAL at 05:41

## 2022-05-02 RX ADMIN — GABAPENTIN 100 MILLIGRAM(S): 400 CAPSULE ORAL at 21:16

## 2022-05-02 RX ADMIN — Medication 4: at 12:19

## 2022-05-02 RX ADMIN — DONEPEZIL HYDROCHLORIDE 10 MILLIGRAM(S): 10 TABLET, FILM COATED ORAL at 21:16

## 2022-05-02 RX ADMIN — ATORVASTATIN CALCIUM 40 MILLIGRAM(S): 80 TABLET, FILM COATED ORAL at 21:17

## 2022-05-02 RX ADMIN — Medication 81 MILLIGRAM(S): at 12:24

## 2022-05-02 RX ADMIN — Medication 2: at 16:29

## 2022-05-02 RX ADMIN — Medication 8 UNIT(S): at 08:27

## 2022-05-02 RX ADMIN — Medication 8 UNIT(S): at 16:29

## 2022-05-02 RX ADMIN — LISINOPRIL 20 MILLIGRAM(S): 2.5 TABLET ORAL at 05:39

## 2022-05-02 RX ADMIN — GABAPENTIN 100 MILLIGRAM(S): 400 CAPSULE ORAL at 05:40

## 2022-05-02 RX ADMIN — GABAPENTIN 100 MILLIGRAM(S): 400 CAPSULE ORAL at 13:17

## 2022-05-02 RX ADMIN — ENOXAPARIN SODIUM 40 MILLIGRAM(S): 100 INJECTION SUBCUTANEOUS at 12:25

## 2022-05-02 RX ADMIN — Medication 25 MILLIGRAM(S): at 17:17

## 2022-05-02 RX ADMIN — MEMANTINE HYDROCHLORIDE 10 MILLIGRAM(S): 10 TABLET ORAL at 12:53

## 2022-05-02 NOTE — PROGRESS NOTE ADULT - ASSESSMENT
73 yo F w/ history of dementia, HTN, IDDMII presented w/ worsened confusion.     Progressive decline of alzheimer's vs frontaltemporal dementia  - daughter reports patient has been seen by x neurologists and was tried on aricept and namenda in hopes it would slow the decline  - Neurology appreciated   - no signs of infectious cause   - MRI was motion degraded but showed no definite intracranial hemorrhage or acute infarction  - extensive counseling given to patients daughter; she understands patients progression as per EMR  - c/w Atarax, Aricept and Namenda  - given elevated homocysteine and normal MMA patient likely had folate deficiency - will start folate    Hyponatremia  - resolved  - related to HCTZ    HTN w/ Hypertensive urgency POA  - c/w lisinopril, hydralazine & Norvasc  - Echo showed EF  60-65% w/ grade I diastolic dysfunction, trace mitral valve regurgitation and mild tricuspid regurgitation    HLD  - c/w Lipitor    IDDM type II  - c/w Lantus, mealtime lispro & ISS   - c/w gabapentin  - Hgb A1C is 9.8    Moderate protein-calorie malnutrition w/ Dysphagia  - c/w diet puree, mod thick as per speech eval    Prophylaxis:  DVT: Lovenox  GI: PO diet    Dispo PT recommending SNF & the pt was accepted to facility awaiting insurance approval.  73 yo F w/ history of dementia, HTN, IDDMII presented w/ worsened confusion.     Progressive decline of alzheimer's vs frontaltemporal dementia  - daughter reports patient has been seen by x neurologists and was tried on aricept and namenda in hopes it would slow the decline  - Neurology appreciated   - no signs of infectious cause   - MRI was motion degraded but showed no definite intracranial hemorrhage or acute infarction  - extensive counseling given to patients daughter; she understands patients progression as per EMR  - c/w Atarax, Aricept and Namenda  - given elevated homocysteine and normal MMA patient likely had folate deficiency - will start folate    Hyponatremia  - resolved  - related to HCTZ    HTN w/ Hypertensive urgency POA  - c/w lisinopril, hydralazine & Norvasc  - Echo showed EF  60-65% w/ grade I diastolic dysfunction, trace mitral valve regurgitation and mild tricuspid regurgitation  - suspect pt's pedal edema is due to Norvasc - but will c/w this medication as it does not appear to be a problem for the patient    HLD  - c/w Lipitor    IDDM type II  - c/w Lantus, mealtime lispro & ISS   - c/w gabapentin  - Hgb A1C is 9.8    Moderate protein-calorie malnutrition w/ Dysphagia  - c/w diet puree, mod thick as per speech eval    Prophylaxis:  DVT: Lovenox  GI: PO diet    Dispo PT recommending SNF & the pt was accepted to facility awaiting insurance approval.

## 2022-05-02 NOTE — PROGRESS NOTE ADULT - SUBJECTIVE AND OBJECTIVE BOX
73 yo F w/ history of dementia, HTN, IDDMII presented w/ worsened confusion. She is sitting at bedside in NAD.    MEDICATIONS  (STANDING):  amLODIPine   Tablet 10 milliGRAM(s) Oral daily  aspirin  chewable 81 milliGRAM(s) Oral daily  atorvastatin 40 milliGRAM(s) Oral at bedtime  dextrose 5%. 1000 milliLiter(s) (100 mL/Hr) IV Continuous <Continuous>  dextrose 5%. 1000 milliLiter(s) (50 mL/Hr) IV Continuous <Continuous>  dextrose 50% Injectable 25 Gram(s) IV Push once  dextrose 50% Injectable 12.5 Gram(s) IV Push once  dextrose 50% Injectable 25 Gram(s) IV Push once  donepezil 10 milliGRAM(s) Oral at bedtime  enoxaparin Injectable 40 milliGRAM(s) SubCutaneous every 24 hours  gabapentin 100 milliGRAM(s) Oral three times a day  glucagon  Injectable 1 milliGRAM(s) IntraMuscular once  hydrALAZINE 25 milliGRAM(s) Oral every 8 hours  hydrOXYzine hydrochloride 25 milliGRAM(s) Oral two times a day  insulin glargine Injectable (LANTUS) 30 Unit(s) SubCutaneous at bedtime  insulin lispro (ADMELOG) corrective regimen sliding scale   SubCutaneous three times a day before meals  insulin lispro (ADMELOG) corrective regimen sliding scale   SubCutaneous at bedtime  insulin lispro Injectable (ADMELOG) 8 Unit(s) SubCutaneous three times a day before meals  lisinopril 20 milliGRAM(s) Oral daily  memantine 10 milliGRAM(s) Oral daily  nystatin Powder 1 Application(s) Topical three times a day    MEDICATIONS  (PRN):  acetaminophen     Tablet .. 650 milliGRAM(s) Oral every 6 hours PRN Temp greater or equal to 38C (100.4F), Mild Pain (1 - 3)  aluminum hydroxide/magnesium hydroxide/simethicone Suspension 30 milliLiter(s) Oral every 4 hours PRN Dyspepsia  dextrose Oral Gel 15 Gram(s) Oral once PRN Blood Glucose LESS THAN 70 milliGRAM(s)/deciliter  melatonin 3 milliGRAM(s) Oral at bedtime PRN Insomnia  ondansetron Injectable 4 milliGRAM(s) IV Push every 8 hours PRN Nausea and/or Vomiting      Allergies    No Known Allergies    Intolerances        Vital Signs Last 24 Hrs  T(C): 36.8 (02 May 2022 15:20), Max: 36.9 (02 May 2022 13:13)  T(F): 98.2 (02 May 2022 15:20), Max: 98.4 (02 May 2022 13:13)  HR: 71 (02 May 2022 15:20) (69 - 88)  BP: 132/78 (02 May 2022 15:20) (122/84 - 132/78)   RR: 18 (02 May 2022 15:20) (17 - 18)  SpO2: 98% (02 May 2022 15:20) (98% - 100%)    PHYSICAL EXAM:  GENERAL: NAD, well-groomed, well-developed  HEAD:  Atraumatic, Normocephalic  EYES: EOMI, PERRLA   NECK: Supple   NERVOUS SYSTEM:  Alert & not oriented  CHEST/LUNG: Clear to auscultation bilaterally; No rales, rhonchi, wheezing, or rubs  HEART: Regular rate and rhythm; No murmurs, rubs, or gallops  ABDOMEN: Soft, Nontender, Nondistended; Bowel sounds present  EXTREMITIES: No clubbing, cyanosis, or edema    LABS:       POCT Blood Glucose.: 246 mg/dL (02 May 2022 21:07)  POCT Blood Glucose.: 222 mg/dL (02 May 2022 16:11)  POCT Blood Glucose.: 302 mg/dL (02 May 2022 11:27)  POCT Blood Glucose.: 160 mg/dL (02 May 2022 07:54)      RADIOLOGY & ADDITIONAL TESTS:    05-01-22 @ 07:01  -  05-02-22 @ 07:00  --------------------------------------------------------  IN:    Oral Fluid: 890 mL  Total IN: 890 mL    OUT:  Total OUT: 0 mL    Total NET: 890 mL       71 yo F w/ history of dementia, HTN, IDDMII presented w/ worsened confusion. She is sitting at bedside in NAD.    MEDICATIONS  (STANDING):  amLODIPine   Tablet 10 milliGRAM(s) Oral daily  aspirin  chewable 81 milliGRAM(s) Oral daily  atorvastatin 40 milliGRAM(s) Oral at bedtime  dextrose 5%. 1000 milliLiter(s) (100 mL/Hr) IV Continuous <Continuous>  dextrose 5%. 1000 milliLiter(s) (50 mL/Hr) IV Continuous <Continuous>  dextrose 50% Injectable 25 Gram(s) IV Push once  dextrose 50% Injectable 12.5 Gram(s) IV Push once  dextrose 50% Injectable 25 Gram(s) IV Push once  donepezil 10 milliGRAM(s) Oral at bedtime  enoxaparin Injectable 40 milliGRAM(s) SubCutaneous every 24 hours  gabapentin 100 milliGRAM(s) Oral three times a day  glucagon  Injectable 1 milliGRAM(s) IntraMuscular once  hydrALAZINE 25 milliGRAM(s) Oral every 8 hours  hydrOXYzine hydrochloride 25 milliGRAM(s) Oral two times a day  insulin glargine Injectable (LANTUS) 30 Unit(s) SubCutaneous at bedtime  insulin lispro (ADMELOG) corrective regimen sliding scale   SubCutaneous three times a day before meals  insulin lispro (ADMELOG) corrective regimen sliding scale   SubCutaneous at bedtime  insulin lispro Injectable (ADMELOG) 8 Unit(s) SubCutaneous three times a day before meals  lisinopril 20 milliGRAM(s) Oral daily  memantine 10 milliGRAM(s) Oral daily  nystatin Powder 1 Application(s) Topical three times a day    MEDICATIONS  (PRN):  acetaminophen     Tablet .. 650 milliGRAM(s) Oral every 6 hours PRN Temp greater or equal to 38C (100.4F), Mild Pain (1 - 3)  aluminum hydroxide/magnesium hydroxide/simethicone Suspension 30 milliLiter(s) Oral every 4 hours PRN Dyspepsia  dextrose Oral Gel 15 Gram(s) Oral once PRN Blood Glucose LESS THAN 70 milliGRAM(s)/deciliter  melatonin 3 milliGRAM(s) Oral at bedtime PRN Insomnia  ondansetron Injectable 4 milliGRAM(s) IV Push every 8 hours PRN Nausea and/or Vomiting      Allergies    No Known Allergies    Intolerances        Vital Signs Last 24 Hrs  T(C): 36.8 (02 May 2022 15:20), Max: 36.9 (02 May 2022 13:13)  T(F): 98.2 (02 May 2022 15:20), Max: 98.4 (02 May 2022 13:13)  HR: 71 (02 May 2022 15:20) (69 - 88)  BP: 132/78 (02 May 2022 15:20) (122/84 - 132/78)   RR: 18 (02 May 2022 15:20) (17 - 18)  SpO2: 98% (02 May 2022 15:20) (98% - 100%)    PHYSICAL EXAM:  GENERAL: NAD, well-groomed, well-developed  HEAD:  Atraumatic, Normocephalic  EYES: EOMI, PERRLA   NECK: Supple   NERVOUS SYSTEM:  Alert & not oriented  CHEST/LUNG: Clear to auscultation bilaterally; No rales, rhonchi, wheezing, or rubs  HEART: Regular rate and rhythm; No murmurs, rubs, or gallops  ABDOMEN: Soft, Nontender, Nondistended; Bowel sounds present  EXTREMITIES: bilateral LE edema    LABS:       POCT Blood Glucose.: 246 mg/dL (02 May 2022 21:07)  POCT Blood Glucose.: 222 mg/dL (02 May 2022 16:11)  POCT Blood Glucose.: 302 mg/dL (02 May 2022 11:27)  POCT Blood Glucose.: 160 mg/dL (02 May 2022 07:54)      RADIOLOGY & ADDITIONAL TESTS:    05-01-22 @ 07:01  -  05-02-22 @ 07:00  --------------------------------------------------------  IN:    Oral Fluid: 890 mL  Total IN: 890 mL    OUT:  Total OUT: 0 mL    Total NET: 890 mL

## 2022-05-03 LAB
ANION GAP SERPL CALC-SCNC: 4 MMOL/L — LOW (ref 5–17)
BUN SERPL-MCNC: 13 MG/DL — SIGNIFICANT CHANGE UP (ref 7–23)
CALCIUM SERPL-MCNC: 8.7 MG/DL — SIGNIFICANT CHANGE UP (ref 8.5–10.1)
CHLORIDE SERPL-SCNC: 113 MMOL/L — HIGH (ref 96–108)
CO2 SERPL-SCNC: 26 MMOL/L — SIGNIFICANT CHANGE UP (ref 22–31)
CREAT SERPL-MCNC: 0.72 MG/DL — SIGNIFICANT CHANGE UP (ref 0.5–1.3)
EGFR: 88 ML/MIN/1.73M2 — SIGNIFICANT CHANGE UP
GLUCOSE BLDC GLUCOMTR-MCNC: 230 MG/DL — HIGH (ref 70–99)
GLUCOSE BLDC GLUCOMTR-MCNC: 263 MG/DL — HIGH (ref 70–99)
GLUCOSE BLDC GLUCOMTR-MCNC: 287 MG/DL — HIGH (ref 70–99)
GLUCOSE SERPL-MCNC: 120 MG/DL — HIGH (ref 70–99)
HCT VFR BLD CALC: 38.3 % — SIGNIFICANT CHANGE UP (ref 34.5–45)
HGB BLD-MCNC: 12.3 G/DL — SIGNIFICANT CHANGE UP (ref 11.5–15.5)
MAGNESIUM SERPL-MCNC: 2.3 MG/DL — SIGNIFICANT CHANGE UP (ref 1.6–2.6)
MCHC RBC-ENTMCNC: 27.1 PG — SIGNIFICANT CHANGE UP (ref 27–34)
MCHC RBC-ENTMCNC: 32.1 G/DL — SIGNIFICANT CHANGE UP (ref 32–36)
MCV RBC AUTO: 84.4 FL — SIGNIFICANT CHANGE UP (ref 80–100)
NRBC # BLD: 0 /100 WBCS — SIGNIFICANT CHANGE UP (ref 0–0)
PHOSPHATE SERPL-MCNC: 3.6 MG/DL — SIGNIFICANT CHANGE UP (ref 2.5–4.5)
PLATELET # BLD AUTO: 275 K/UL — SIGNIFICANT CHANGE UP (ref 150–400)
POTASSIUM SERPL-MCNC: 3.9 MMOL/L — SIGNIFICANT CHANGE UP (ref 3.5–5.3)
POTASSIUM SERPL-SCNC: 3.9 MMOL/L — SIGNIFICANT CHANGE UP (ref 3.5–5.3)
RBC # BLD: 4.54 M/UL — SIGNIFICANT CHANGE UP (ref 3.8–5.2)
RBC # FLD: 13.2 % — SIGNIFICANT CHANGE UP (ref 10.3–14.5)
SODIUM SERPL-SCNC: 143 MMOL/L — SIGNIFICANT CHANGE UP (ref 135–145)
VIT B1 SERPL-MCNC: 104.9 NMOL/L — SIGNIFICANT CHANGE UP (ref 66.5–200)
WBC # BLD: 7.18 K/UL — SIGNIFICANT CHANGE UP (ref 3.8–10.5)
WBC # FLD AUTO: 7.18 K/UL — SIGNIFICANT CHANGE UP (ref 3.8–10.5)

## 2022-05-03 PROCEDURE — 99231 SBSQ HOSP IP/OBS SF/LOW 25: CPT

## 2022-05-03 RX ADMIN — GABAPENTIN 100 MILLIGRAM(S): 400 CAPSULE ORAL at 13:21

## 2022-05-03 RX ADMIN — DONEPEZIL HYDROCHLORIDE 10 MILLIGRAM(S): 10 TABLET, FILM COATED ORAL at 21:37

## 2022-05-03 RX ADMIN — LISINOPRIL 20 MILLIGRAM(S): 2.5 TABLET ORAL at 05:31

## 2022-05-03 RX ADMIN — Medication 8 UNIT(S): at 11:50

## 2022-05-03 RX ADMIN — MEMANTINE HYDROCHLORIDE 10 MILLIGRAM(S): 10 TABLET ORAL at 11:52

## 2022-05-03 RX ADMIN — Medication 3: at 16:44

## 2022-05-03 RX ADMIN — Medication 81 MILLIGRAM(S): at 11:52

## 2022-05-03 RX ADMIN — Medication 25 MILLIGRAM(S): at 21:34

## 2022-05-03 RX ADMIN — Medication 8 UNIT(S): at 16:44

## 2022-05-03 RX ADMIN — Medication 25 MILLIGRAM(S): at 05:30

## 2022-05-03 RX ADMIN — Medication 25 MILLIGRAM(S): at 17:24

## 2022-05-03 RX ADMIN — NYSTATIN CREAM 1 APPLICATION(S): 100000 CREAM TOPICAL at 21:33

## 2022-05-03 RX ADMIN — GABAPENTIN 100 MILLIGRAM(S): 400 CAPSULE ORAL at 05:31

## 2022-05-03 RX ADMIN — Medication 2 MILLIGRAM(S): at 11:51

## 2022-05-03 RX ADMIN — ENOXAPARIN SODIUM 40 MILLIGRAM(S): 100 INJECTION SUBCUTANEOUS at 11:52

## 2022-05-03 RX ADMIN — Medication 3: at 11:50

## 2022-05-03 RX ADMIN — Medication 8 UNIT(S): at 08:13

## 2022-05-03 RX ADMIN — Medication 0: at 21:34

## 2022-05-03 RX ADMIN — INSULIN GLARGINE 30 UNIT(S): 100 INJECTION, SOLUTION SUBCUTANEOUS at 21:35

## 2022-05-03 RX ADMIN — GABAPENTIN 100 MILLIGRAM(S): 400 CAPSULE ORAL at 21:36

## 2022-05-03 RX ADMIN — NYSTATIN CREAM 1 APPLICATION(S): 100000 CREAM TOPICAL at 13:21

## 2022-05-03 RX ADMIN — ATORVASTATIN CALCIUM 40 MILLIGRAM(S): 80 TABLET, FILM COATED ORAL at 21:36

## 2022-05-03 RX ADMIN — NYSTATIN CREAM 1 APPLICATION(S): 100000 CREAM TOPICAL at 05:32

## 2022-05-03 RX ADMIN — AMLODIPINE BESYLATE 10 MILLIGRAM(S): 2.5 TABLET ORAL at 05:30

## 2022-05-03 NOTE — PROGRESS NOTE ADULT - SUBJECTIVE AND OBJECTIVE BOX
CHIEF COMPLAINT: Follow up of dementia, HTN   no fever  no new overnight events       PHYSICAL EXAM:    GENERAL: Elderly, no acute distress   CHEST/LUNG: Clear to ausculation bilaterally, no wheezing, no crackles   HEART: Regular rate and rhythm; No murmurs, rubs  ABDOMEN: Soft, Nontender, Nondistended; Bowel sounds present  EXTREMITIES:  ROM could not , No clubbing, cyanosis. +leg edema  NERVOUS SYSTEM:  Limited as patient did not follow any commands  Psychiatry: AA and + dementia       OBJECTIVE DATA:   Vital Signs Last 24 Hrs  stable vital signs   RR: 16 (03 May 2022)  SpO2: 98% (03 May 2022)            Daily     Daily   LABS:                        12.3   7.18  )-----------( 275      ( 03 May 2022 06:32 )             38.3             05-03    143  |  113<H>  |  13  ----------------------------<  120<H>  3.9   |  26  |  0.72    Ca    8.7      03 May 2022 06:32  Phos  3.6     05-03  Mg     2.3     05-03                          CAPILLARY BLOOD GLUCOSE      POCT Blood Glucose.: 116 mg/dL (04 May 2022 11:23)          MEDICATIONS  (STANDING):  amLODIPine   Tablet 10 milliGRAM(s) Oral daily  aspirin  chewable 81 milliGRAM(s) Oral daily  atorvastatin 40 milliGRAM(s) Oral at bedtime  dextrose 5%. 1000 milliLiter(s) (50 mL/Hr) IV Continuous <Continuous>  dextrose 5%. 1000 milliLiter(s) (100 mL/Hr) IV Continuous <Continuous>  dextrose 50% Injectable 25 Gram(s) IV Push once  dextrose 50% Injectable 12.5 Gram(s) IV Push once  dextrose 50% Injectable 25 Gram(s) IV Push once  donepezil 10 milliGRAM(s) Oral at bedtime  enoxaparin Injectable 40 milliGRAM(s) SubCutaneous every 24 hours  folic acid 2 milliGRAM(s) Oral daily  gabapentin 100 milliGRAM(s) Oral three times a day  glucagon  Injectable 1 milliGRAM(s) IntraMuscular once  hydrALAZINE 25 milliGRAM(s) Oral every 8 hours  hydrOXYzine hydrochloride 25 milliGRAM(s) Oral two times a day  insulin glargine Injectable (LANTUS) 30 Unit(s) SubCutaneous at bedtime  insulin lispro (ADMELOG) corrective regimen sliding scale   SubCutaneous three times a day before meals  insulin lispro (ADMELOG) corrective regimen sliding scale   SubCutaneous at bedtime  insulin lispro Injectable (ADMELOG) 8 Unit(s) SubCutaneous three times a day before meals  lisinopril 20 milliGRAM(s) Oral daily  memantine 10 milliGRAM(s) Oral daily  nystatin Powder 1 Application(s) Topical three times a day    MEDICATIONS  (PRN):  acetaminophen     Tablet .. 650 milliGRAM(s) Oral every 6 hours PRN Temp greater or equal to 38C (100.4F), Mild Pain (1 - 3)  aluminum hydroxide/magnesium hydroxide/simethicone Suspension 30 milliLiter(s) Oral every 4 hours PRN Dyspepsia  dextrose Oral Gel 15 Gram(s) Oral once PRN Blood Glucose LESS THAN 70 milliGRAM(s)/deciliter  melatonin 3 milliGRAM(s) Oral at bedtime PRN Insomnia  ondansetron Injectable 4 milliGRAM(s) IV Push every 8 hours PRN Nausea and/or Vomiting

## 2022-05-03 NOTE — PROGRESS NOTE ADULT - ASSESSMENT
73 yo F w/ history of dementia, HTN, IDDMII presented w/ worsened confusion.     Progressive decline of alzheimer's vs frontaltemporal dementia  - daughter reports patient has been seen by x neurologists and was tried on aricept and namenda in hopes it would slow the decline  - Neurology appreciated   - no signs of infectious cause   - MRI was motion degraded but showed no definite intracranial hemorrhage or acute infarction  - extensive counseling given to patients daughter; she understands patients progression as per EMR  - c/w Atarax, Aricept and Namenda  - given elevated homocysteine and normal MMA patient likely had folate deficiency - cont folate.     Hyponatremia  - resolved  - related to HCTZ. Off HCTZ    HTN w/ Hypertensive urgency POA  - c/w lisinopril, hydralazine & Norvasc  - Echo showed EF  60-65% w/ grade I diastolic dysfunction, trace mitral valve regurgitation and mild tricuspid regurgitation  - suspect pt's pedal edema is due to Norvasc - but will c/w this medication as it does not appear to be a problem for the patient    HLD  - c/w Lipitor    IDDM type II  - c/w Lantus, mealtime lispro & ISS   - c/w gabapentin  - Hgb A1C is 9.8    Moderate protein-calorie malnutrition w/ Dysphagia  - c/w diet puree, mod thick as per speech eval    Prophylaxis:  DVT: Lovenox  GI: PO diet    Dispo PT recommending SNF & the pt was accepted to facility awaiting insurance approval.

## 2022-05-04 LAB
GLUCOSE BLDC GLUCOMTR-MCNC: 116 MG/DL — HIGH (ref 70–99)
GLUCOSE BLDC GLUCOMTR-MCNC: 172 MG/DL — HIGH (ref 70–99)
GLUCOSE BLDC GLUCOMTR-MCNC: 304 MG/DL — HIGH (ref 70–99)
GLUCOSE BLDC GLUCOMTR-MCNC: 358 MG/DL — HIGH (ref 70–99)

## 2022-05-04 PROCEDURE — 99231 SBSQ HOSP IP/OBS SF/LOW 25: CPT

## 2022-05-04 RX ADMIN — NYSTATIN CREAM 1 APPLICATION(S): 100000 CREAM TOPICAL at 22:00

## 2022-05-04 RX ADMIN — GABAPENTIN 100 MILLIGRAM(S): 400 CAPSULE ORAL at 13:35

## 2022-05-04 RX ADMIN — Medication 25 MILLIGRAM(S): at 05:50

## 2022-05-04 RX ADMIN — Medication 1: at 08:12

## 2022-05-04 RX ADMIN — NYSTATIN CREAM 1 APPLICATION(S): 100000 CREAM TOPICAL at 05:50

## 2022-05-04 RX ADMIN — Medication 5: at 17:13

## 2022-05-04 RX ADMIN — Medication 25 MILLIGRAM(S): at 17:32

## 2022-05-04 RX ADMIN — ATORVASTATIN CALCIUM 40 MILLIGRAM(S): 80 TABLET, FILM COATED ORAL at 21:56

## 2022-05-04 RX ADMIN — GABAPENTIN 100 MILLIGRAM(S): 400 CAPSULE ORAL at 21:57

## 2022-05-04 RX ADMIN — Medication 2: at 21:59

## 2022-05-04 RX ADMIN — INSULIN GLARGINE 30 UNIT(S): 100 INJECTION, SOLUTION SUBCUTANEOUS at 21:55

## 2022-05-04 RX ADMIN — Medication 25 MILLIGRAM(S): at 21:58

## 2022-05-04 RX ADMIN — Medication 81 MILLIGRAM(S): at 11:25

## 2022-05-04 RX ADMIN — Medication 8 UNIT(S): at 17:25

## 2022-05-04 RX ADMIN — MEMANTINE HYDROCHLORIDE 10 MILLIGRAM(S): 10 TABLET ORAL at 11:25

## 2022-05-04 RX ADMIN — NYSTATIN CREAM 1 APPLICATION(S): 100000 CREAM TOPICAL at 16:37

## 2022-05-04 RX ADMIN — ENOXAPARIN SODIUM 40 MILLIGRAM(S): 100 INJECTION SUBCUTANEOUS at 11:25

## 2022-05-04 RX ADMIN — Medication 8 UNIT(S): at 08:13

## 2022-05-04 RX ADMIN — DONEPEZIL HYDROCHLORIDE 10 MILLIGRAM(S): 10 TABLET, FILM COATED ORAL at 21:58

## 2022-05-04 RX ADMIN — Medication 25 MILLIGRAM(S): at 13:32

## 2022-05-04 RX ADMIN — AMLODIPINE BESYLATE 10 MILLIGRAM(S): 2.5 TABLET ORAL at 05:50

## 2022-05-04 RX ADMIN — GABAPENTIN 100 MILLIGRAM(S): 400 CAPSULE ORAL at 05:50

## 2022-05-04 RX ADMIN — LISINOPRIL 20 MILLIGRAM(S): 2.5 TABLET ORAL at 05:50

## 2022-05-04 RX ADMIN — Medication 2 MILLIGRAM(S): at 11:25

## 2022-05-04 RX ADMIN — Medication 25 MILLIGRAM(S): at 06:03

## 2022-05-04 NOTE — PROGRESS NOTE ADULT - ASSESSMENT
71 yo F w/ history of dementia, HTN, IDDMII presented w/ worsened confusion.     Progressive decline of alzheimer's vs frontaltemporal dementia  - daughter reports patient has been seen by x neurologists and was tried on aricept and namenda in hopes it would slow the decline  - Neurology appreciated   - no signs of infectious cause   - MRI was motion degraded but showed no definite intracranial hemorrhage or acute infarction  - extensive counseling given to patients daughter; she understands patients progression as per EMR  - c/w Atarax, Aricept and Namenda  - given elevated homocysteine and normal MMA patient likely had folate deficiency - will start folate    Hyponatremia  - resolved  - related to HCTZ    HTN w/ Hypertensive urgency POA  - c/w lisinopril, hydralazine & Norvasc  - Echo showed EF  60-65% w/ grade I diastolic dysfunction, trace mitral valve regurgitation and mild tricuspid regurgitation  - suspect pt's pedal edema is due to Norvasc - but will c/w this medication as it does not appear to be a problem for the patient    HLD  - c/w Lipitor    IDDM type II  - c/w Lantus, mealtime lispro & ISS   - c/w gabapentin  - Hgb A1C is 9.8    Moderate protein-calorie malnutrition w/ Dysphagia  - c/w diet puree, mod thick as per speech eval    Prophylaxis:  DVT: Lovenox  GI: PO diet    Dispo PT recommending SNF & the pt was accepted to facility awaiting insurance approval.

## 2022-05-04 NOTE — PROGRESS NOTE ADULT - SUBJECTIVE AND OBJECTIVE BOX
71 yo F w/ history of dementia, HTN, IDDMII presented w/ worsened confusion. She is sitting at bedside in NAD.    MEDICATIONS  (STANDING):  amLODIPine   Tablet 10 milliGRAM(s) Oral daily  aspirin  chewable 81 milliGRAM(s) Oral daily  atorvastatin 40 milliGRAM(s) Oral at bedtime  dextrose 5%. 1000 milliLiter(s) (50 mL/Hr) IV Continuous <Continuous>  dextrose 5%. 1000 milliLiter(s) (100 mL/Hr) IV Continuous <Continuous>  dextrose 50% Injectable 25 Gram(s) IV Push once  dextrose 50% Injectable 12.5 Gram(s) IV Push once  dextrose 50% Injectable 25 Gram(s) IV Push once  donepezil 10 milliGRAM(s) Oral at bedtime  enoxaparin Injectable 40 milliGRAM(s) SubCutaneous every 24 hours  folic acid 2 milliGRAM(s) Oral daily  gabapentin 100 milliGRAM(s) Oral three times a day  glucagon  Injectable 1 milliGRAM(s) IntraMuscular once  hydrALAZINE 25 milliGRAM(s) Oral every 8 hours  hydrOXYzine hydrochloride 25 milliGRAM(s) Oral two times a day  insulin glargine Injectable (LANTUS) 30 Unit(s) SubCutaneous at bedtime  insulin lispro (ADMELOG) corrective regimen sliding scale   SubCutaneous three times a day before meals  insulin lispro (ADMELOG) corrective regimen sliding scale   SubCutaneous at bedtime  insulin lispro Injectable (ADMELOG) 8 Unit(s) SubCutaneous three times a day before meals  lisinopril 20 milliGRAM(s) Oral daily  memantine 10 milliGRAM(s) Oral daily  nystatin Powder 1 Application(s) Topical three times a day    MEDICATIONS  (PRN):  acetaminophen     Tablet .. 650 milliGRAM(s) Oral every 6 hours PRN Temp greater or equal to 38C (100.4F), Mild Pain (1 - 3)  aluminum hydroxide/magnesium hydroxide/simethicone Suspension 30 milliLiter(s) Oral every 4 hours PRN Dyspepsia  dextrose Oral Gel 15 Gram(s) Oral once PRN Blood Glucose LESS THAN 70 milliGRAM(s)/deciliter  melatonin 3 milliGRAM(s) Oral at bedtime PRN Insomnia  ondansetron Injectable 4 milliGRAM(s) IV Push every 8 hours PRN Nausea and/or Vomiting        Allergies    No Known Allergies    Intolerances        Vital Signs Last 24 Hrs  T(C): 36.7 (04 May 2022 13:40), Max: 36.8 (04 May 2022 05:09)  T(F): 98 (04 May 2022 13:40), Max: 98.2 (04 May 2022 05:09)  HR: 94 (04 May 2022 13:40) (64 - 97)  BP: 138/68 (04 May 2022 13:40) (138/68 - 152/81)  BP(mean): --  RR: 16 (04 May 2022 13:40) (16 - 18)  SpO2: 98% (04 May 2022 13:40) (98% - 100%)      PHYSICAL EXAM:  GENERAL: NAD, well-groomed, well-developed  HEAD:  Atraumatic, Normocephalic  EYES: EOMI, PERRLA   NECK: Supple   NERVOUS SYSTEM:  Alert & not oriented  CHEST/LUNG: Clear to auscultation bilaterally; No rales, rhonchi, wheezing, or rubs  HEART: Regular rate and rhythm; No murmurs, rubs, or gallops  ABDOMEN: Soft, Nontender, Nondistended; Bowel sounds present  EXTREMITIES: bilateral LE edema    LABS:                           12.3   7.18  )-----------( 275      ( 03 May 2022 06:32 )             38.3     05-03    143  |  113<H>  |  13  ----------------------------<  120<H>  3.9   |  26  |  0.72    Ca    8.7      03 May 2022 06:32  Phos  3.6     05-03  Mg     2.3     05-03              RADIOLOGY & ADDITIONAL TESTS:    05-01-22 @ 07:01  -  05-02-22 @ 07:00  --------------------------------------------------------  IN:    Oral Fluid: 890 mL  Total IN: 890 mL    OUT:  Total OUT: 0 mL    Total NET: 890 mL

## 2022-05-04 NOTE — CHART NOTE - NSCHARTNOTEFT_GEN_A_CORE
Pt admitted c AMS, worsening confusion due to progressive decline of alzheimer's vs frontal temporal dementia.  PMHx includes HTN, HLD, T2DM, dementia.  Authorization pending for [facility] placement.     Factors impacting intake: [ ] none [ ] nausea  [ ] vomiting [ ] diarrhea [ ] constipation  [ X]chewing problems [X ] swallowing issues  [X ] other: AMS    Diet Prescription: Diet, Regular:   Consistent Carbohydrate {Evening Snack}  Pureed (PUREED)  Moderately Thick Liquids (MODTHICKLIQS) (04-17-22)    Intake:   pt continues c good appetite; >75% most meals; requires feeding assistance    Current Weight:   61.2 kg (5/3); admission wt 62 kg (4/17)  % Weight Change: 1.3% wt loss x 16 days    No edema noted    Nutrition focused physical exam conducted:    Subcutaneous fat loss: [mild ] Orbital fat pads region, [mild ]Buccal fat region, [moderate ]Triceps region,  [mild ]Ribs region.    Muscle wasting: [mild ]Temples region, [WDL ]Clavicle region, [WDL ]Shoulder region, [unable ]Scapula region, [unable ]Interosseous region,  [mild ]thigh region, [ moderate]Calf region    Pertinent Medications: MEDICATIONS  (STANDING):  amLODIPine   Tablet 10 milliGRAM(s) Oral daily  aspirin  chewable 81 milliGRAM(s) Oral daily  atorvastatin 40 milliGRAM(s) Oral at bedtime  dextrose 5%. 1000 milliLiter(s) (50 mL/Hr) IV Continuous <Continuous>  dextrose 5%. 1000 milliLiter(s) (100 mL/Hr) IV Continuous <Continuous>  dextrose 50% Injectable 25 Gram(s) IV Push once  dextrose 50% Injectable 12.5 Gram(s) IV Push once  dextrose 50% Injectable 25 Gram(s) IV Push once  donepezil 10 milliGRAM(s) Oral at bedtime  enoxaparin Injectable 40 milliGRAM(s) SubCutaneous every 24 hours  folic acid 2 milliGRAM(s) Oral daily  gabapentin 100 milliGRAM(s) Oral three times a day  glucagon  Injectable 1 milliGRAM(s) IntraMuscular once  hydrALAZINE 25 milliGRAM(s) Oral every 8 hours  hydrOXYzine hydrochloride 25 milliGRAM(s) Oral two times a day  insulin glargine Injectable (LANTUS) 30 Unit(s) SubCutaneous at bedtime  insulin lispro (ADMELOG) corrective regimen sliding scale   SubCutaneous three times a day before meals  insulin lispro (ADMELOG) corrective regimen sliding scale   SubCutaneous at bedtime  insulin lispro Injectable (ADMELOG) 8 Unit(s) SubCutaneous three times a day before meals  lisinopril 20 milliGRAM(s) Oral daily  memantine 10 milliGRAM(s) Oral daily  nystatin Powder 1 Application(s) Topical three times a day    MEDICATIONS  (PRN):  acetaminophen     Tablet .. 650 milliGRAM(s) Oral every 6 hours PRN Temp greater or equal to 38C (100.4F), Mild Pain (1 - 3)  aluminum hydroxide/magnesium hydroxide/simethicone Suspension 30 milliLiter(s) Oral every 4 hours PRN Dyspepsia  dextrose Oral Gel 15 Gram(s) Oral once PRN Blood Glucose LESS THAN 70 milliGRAM(s)/deciliter  melatonin 3 milliGRAM(s) Oral at bedtime PRN Insomnia  ondansetron Injectable 4 milliGRAM(s) IV Push every 8 hours PRN Nausea and/or Vomiting    Pertinent Labs: 05-03 Na143 mmol/L Glu 120 mg/dL<H> K+ 3.9 mmol/L Cr  0.72 mg/dL BUN 13 mg/dL 05-03 Phos 3.6 mg/dL 04-18 Chol 157 mg/dL LDL --    HDL 44 mg/dL<L> Trig 118 mg/dL  04-18-22  A1C 9.8%; 05-03 POCT: 127, 287, 263, 230    Skin:   no pressure ulcers    Estimated Needs:   [X ] no change since previous assessment (4/20)  [ ] recalculated:     Previous Nutrition Diagnosis: (04/20)  Malnutrition: moderate malnutrition in context of chronic illness  Etiology: impaired nutrient absorption in setting of uncontrolled diabetes  Signs/Symptoms: physical findings of mild/moderate fat muscle loss, A1C=9.8%    Goal/Expected Outcome: pt c >75% meal intake; met at present                                       older adult glucose goal 100-200 mg/dL; not met     Nutrition Diagnosis is [ ] ongoing  [ ] resolved [X ] not applicable     New Nutrition Diagnosis:   [X]  Moderate Malnutrition in context of chronic illness  Etiology:  Inadequate energy/protein intake related to dementia & impaired nutrient absorption due to uncontrolled DM  Signs & Symptoms:  physical findings of mild/moderate fat depletion & muscle wasting as noted    GOAL:  Pt to consume >75% meals/supplements; maintain wt +/- 2# during LOS      Interventions:   continue current diet rx as noted  Recommend  [ ] Change Diet To:  [ ] Nutrition Supplement  [ ] Nutrition Support  [ ] Other:     Monitoring and Evaluation:   [X ] PO intake [ x ] Tolerance to diet prescription [ x ] weights [ x ] labs[ x ] follow up per protocol  [ ] other: Pt admitted c AMS, worsening confusion due to progressive decline of alzheimer's vs frontal temporal dementia.  PMHx includes HTN, HLD, T2DM, dementia.  Authorization pending for [facility] placement.     Factors impacting intake: [ ] none [ ] nausea  [ ] vomiting [ ] diarrhea [ ] constipation  [ X]chewing problems [X ] swallowing issues  [X ] other: AMS    Diet Prescription: Diet, Regular:   Consistent Carbohydrate {Evening Snack}  Pureed (PUREED)  Moderately Thick Liquids (MODTHICKLIQS) (04-17-22)    Intake:   pt continues c good appetite; >75% most meals; requires feeding assistance; pt also consuming food family brings in    Current Weight:   61.2 kg (5/3); admission wt 62 kg (4/17)  % Weight Change: 1.3% wt loss x 16 days    No edema noted    Nutrition focused physical exam conducted:    Subcutaneous fat loss: [mild ] Orbital fat pads region, [mild ]Buccal fat region, [moderate ]Triceps region,  [mild ]Ribs region.    Muscle wasting: [mild ]Temples region, [WDL ]Clavicle region, [WDL ]Shoulder region, [unable ]Scapula region, [unable ]Interosseous region,  [mild ]thigh region, [ moderate]Calf region    Pertinent Medications: MEDICATIONS  (STANDING):  amLODIPine   Tablet 10 milliGRAM(s) Oral daily  aspirin  chewable 81 milliGRAM(s) Oral daily  atorvastatin 40 milliGRAM(s) Oral at bedtime  dextrose 5%. 1000 milliLiter(s) (50 mL/Hr) IV Continuous <Continuous>  dextrose 5%. 1000 milliLiter(s) (100 mL/Hr) IV Continuous <Continuous>  dextrose 50% Injectable 25 Gram(s) IV Push once  dextrose 50% Injectable 12.5 Gram(s) IV Push once  dextrose 50% Injectable 25 Gram(s) IV Push once  donepezil 10 milliGRAM(s) Oral at bedtime  enoxaparin Injectable 40 milliGRAM(s) SubCutaneous every 24 hours  folic acid 2 milliGRAM(s) Oral daily  gabapentin 100 milliGRAM(s) Oral three times a day  glucagon  Injectable 1 milliGRAM(s) IntraMuscular once  hydrALAZINE 25 milliGRAM(s) Oral every 8 hours  hydrOXYzine hydrochloride 25 milliGRAM(s) Oral two times a day  insulin glargine Injectable (LANTUS) 30 Unit(s) SubCutaneous at bedtime  insulin lispro (ADMELOG) corrective regimen sliding scale   SubCutaneous three times a day before meals  insulin lispro (ADMELOG) corrective regimen sliding scale   SubCutaneous at bedtime  insulin lispro Injectable (ADMELOG) 8 Unit(s) SubCutaneous three times a day before meals  lisinopril 20 milliGRAM(s) Oral daily  memantine 10 milliGRAM(s) Oral daily  nystatin Powder 1 Application(s) Topical three times a day    MEDICATIONS  (PRN):  acetaminophen     Tablet .. 650 milliGRAM(s) Oral every 6 hours PRN Temp greater or equal to 38C (100.4F), Mild Pain (1 - 3)  aluminum hydroxide/magnesium hydroxide/simethicone Suspension 30 milliLiter(s) Oral every 4 hours PRN Dyspepsia  dextrose Oral Gel 15 Gram(s) Oral once PRN Blood Glucose LESS THAN 70 milliGRAM(s)/deciliter  melatonin 3 milliGRAM(s) Oral at bedtime PRN Insomnia  ondansetron Injectable 4 milliGRAM(s) IV Push every 8 hours PRN Nausea and/or Vomiting    Pertinent Labs: 05-03 Na143 mmol/L Glu 120 mg/dL<H> K+ 3.9 mmol/L Cr  0.72 mg/dL BUN 13 mg/dL 05-03 Phos 3.6 mg/dL 04-18 Chol 157 mg/dL LDL --    HDL 44 mg/dL<L> Trig 118 mg/dL  04-18-22  A1C 9.8%; 05-03 POCT: 127, 287, 263, 230    Skin:   no pressure ulcers    Estimated Needs:   [X ] no change since previous assessment (4/20)  [ ] recalculated:     Previous Nutrition Diagnosis: (04/20)  Malnutrition: moderate malnutrition in context of chronic illness  Etiology: impaired nutrient absorption in setting of uncontrolled diabetes  Signs/Symptoms: physical findings of mild/moderate fat muscle loss, A1C=9.8%    Goal/Expected Outcome: pt c >75% meal intake; met at present                                       older adult glucose goal 100-200 mg/dL; not met     Nutrition Diagnosis is [ ] ongoing  [ ] resolved [X ] not applicable     New Nutrition Diagnosis:   [X]  Moderate Malnutrition in context of chronic illness  Etiology:  Inadequate energy/protein intake related to dementia & impaired nutrient absorption due to uncontrolled DM  Signs & Symptoms:  physical findings of mild/moderate fat depletion & muscle wasting as noted    GOAL:  Pt to consume >75% meals/supplements; maintain wt +/- 2# during LOS      Interventions:   continue current diet rx as noted  Recommend  [ ] Change Diet To:  [ ] Nutrition Supplement  [ ] Nutrition Support  [ ] Other:     Monitoring and Evaluation:   [X ] PO intake [ x ] Tolerance to diet prescription [ x ] weights [ x ] labs[ x ] follow up per protocol  [ ] other:

## 2022-05-05 LAB
FLUAV AG NPH QL: SIGNIFICANT CHANGE UP
FLUBV AG NPH QL: SIGNIFICANT CHANGE UP
GLUCOSE BLDC GLUCOMTR-MCNC: 166 MG/DL — HIGH (ref 70–99)
GLUCOSE BLDC GLUCOMTR-MCNC: 197 MG/DL — HIGH (ref 70–99)
GLUCOSE BLDC GLUCOMTR-MCNC: 241 MG/DL — HIGH (ref 70–99)
GLUCOSE BLDC GLUCOMTR-MCNC: 324 MG/DL — HIGH (ref 70–99)
SARS-COV-2 RNA SPEC QL NAA+PROBE: SIGNIFICANT CHANGE UP

## 2022-05-05 PROCEDURE — 99231 SBSQ HOSP IP/OBS SF/LOW 25: CPT

## 2022-05-05 RX ORDER — FOLIC ACID 0.8 MG
1 TABLET ORAL
Qty: 0 | Refills: 0 | DISCHARGE
Start: 2022-05-05

## 2022-05-05 RX ADMIN — GABAPENTIN 100 MILLIGRAM(S): 400 CAPSULE ORAL at 22:09

## 2022-05-05 RX ADMIN — Medication 1: at 08:00

## 2022-05-05 RX ADMIN — NYSTATIN CREAM 1 APPLICATION(S): 100000 CREAM TOPICAL at 13:19

## 2022-05-05 RX ADMIN — NYSTATIN CREAM 1 APPLICATION(S): 100000 CREAM TOPICAL at 22:12

## 2022-05-05 RX ADMIN — NYSTATIN CREAM 1 APPLICATION(S): 100000 CREAM TOPICAL at 06:12

## 2022-05-05 RX ADMIN — Medication 25 MILLIGRAM(S): at 22:09

## 2022-05-05 RX ADMIN — DONEPEZIL HYDROCHLORIDE 10 MILLIGRAM(S): 10 TABLET, FILM COATED ORAL at 22:09

## 2022-05-05 RX ADMIN — Medication 4: at 11:05

## 2022-05-05 RX ADMIN — Medication 25 MILLIGRAM(S): at 17:13

## 2022-05-05 RX ADMIN — INSULIN GLARGINE 30 UNIT(S): 100 INJECTION, SOLUTION SUBCUTANEOUS at 22:08

## 2022-05-05 RX ADMIN — GABAPENTIN 100 MILLIGRAM(S): 400 CAPSULE ORAL at 13:20

## 2022-05-05 RX ADMIN — AMLODIPINE BESYLATE 10 MILLIGRAM(S): 2.5 TABLET ORAL at 06:11

## 2022-05-05 RX ADMIN — Medication 2: at 17:11

## 2022-05-05 RX ADMIN — Medication 25 MILLIGRAM(S): at 06:10

## 2022-05-05 RX ADMIN — ENOXAPARIN SODIUM 40 MILLIGRAM(S): 100 INJECTION SUBCUTANEOUS at 11:04

## 2022-05-05 RX ADMIN — Medication 8 UNIT(S): at 17:12

## 2022-05-05 RX ADMIN — ATORVASTATIN CALCIUM 40 MILLIGRAM(S): 80 TABLET, FILM COATED ORAL at 22:09

## 2022-05-05 RX ADMIN — MEMANTINE HYDROCHLORIDE 10 MILLIGRAM(S): 10 TABLET ORAL at 11:04

## 2022-05-05 RX ADMIN — LISINOPRIL 20 MILLIGRAM(S): 2.5 TABLET ORAL at 06:11

## 2022-05-05 RX ADMIN — Medication 2 MILLIGRAM(S): at 11:04

## 2022-05-05 RX ADMIN — Medication 81 MILLIGRAM(S): at 11:04

## 2022-05-05 RX ADMIN — GABAPENTIN 100 MILLIGRAM(S): 400 CAPSULE ORAL at 06:10

## 2022-05-05 RX ADMIN — Medication 8 UNIT(S): at 11:05

## 2022-05-05 NOTE — PROGRESS NOTE ADULT - SUBJECTIVE AND OBJECTIVE BOX
71 yo F w/ history of dementia, HTN, IDDMII presented w/ worsened confusion. She is sitting at bedside in NAD.    MEDICATIONS  (STANDING):  amLODIPine   Tablet 10 milliGRAM(s) Oral daily  aspirin  chewable 81 milliGRAM(s) Oral daily  atorvastatin 40 milliGRAM(s) Oral at bedtime  dextrose 5%. 1000 milliLiter(s) (50 mL/Hr) IV Continuous <Continuous>  dextrose 5%. 1000 milliLiter(s) (100 mL/Hr) IV Continuous <Continuous>  dextrose 50% Injectable 25 Gram(s) IV Push once  dextrose 50% Injectable 12.5 Gram(s) IV Push once  dextrose 50% Injectable 25 Gram(s) IV Push once  donepezil 10 milliGRAM(s) Oral at bedtime  enoxaparin Injectable 40 milliGRAM(s) SubCutaneous every 24 hours  folic acid 2 milliGRAM(s) Oral daily  gabapentin 100 milliGRAM(s) Oral three times a day  glucagon  Injectable 1 milliGRAM(s) IntraMuscular once  hydrALAZINE 25 milliGRAM(s) Oral every 8 hours  hydrOXYzine hydrochloride 25 milliGRAM(s) Oral two times a day  insulin glargine Injectable (LANTUS) 30 Unit(s) SubCutaneous at bedtime  insulin lispro (ADMELOG) corrective regimen sliding scale   SubCutaneous three times a day before meals  insulin lispro (ADMELOG) corrective regimen sliding scale   SubCutaneous at bedtime  insulin lispro Injectable (ADMELOG) 8 Unit(s) SubCutaneous three times a day before meals  lisinopril 20 milliGRAM(s) Oral daily  memantine 10 milliGRAM(s) Oral daily  nystatin Powder 1 Application(s) Topical three times a day    MEDICATIONS  (PRN):  acetaminophen     Tablet .. 650 milliGRAM(s) Oral every 6 hours PRN Temp greater or equal to 38C (100.4F), Mild Pain (1 - 3)  aluminum hydroxide/magnesium hydroxide/simethicone Suspension 30 milliLiter(s) Oral every 4 hours PRN Dyspepsia  dextrose Oral Gel 15 Gram(s) Oral once PRN Blood Glucose LESS THAN 70 milliGRAM(s)/deciliter  melatonin 3 milliGRAM(s) Oral at bedtime PRN Insomnia  ondansetron Injectable 4 milliGRAM(s) IV Push every 8 hours PRN Nausea and/or Vomiting        Allergies    No Known Allergies    Intolerances        Vital Signs Last 24 Hrs  T(C): 36.4 (05 May 2022 05:00), Max: 36.8 (04 May 2022 17:26)  T(F): 97.5 (05 May 2022 05:00), Max: 98.3 (04 May 2022 17:26)  HR: 90 (05 May 2022 05:00) (69 - 94)  BP: 136/79 (05 May 2022 05:00) (116/70 - 138/68)  BP(mean): --  RR: 17 (05 May 2022 05:00) (16 - 18)  SpO2: 99% (05 May 2022 05:00) (98% - 100%)    PHYSICAL EXAM:  GENERAL: NAD, well-groomed, well-developed  HEAD:  Atraumatic, Normocephalic  EYES: EOMI, PERRLA   NECK: Supple   NERVOUS SYSTEM:  Alert & not oriented  CHEST/LUNG: Clear to auscultation bilaterally; No rales, rhonchi, wheezing, or rubs  HEART: Regular rate and rhythm; No murmurs, rubs, or gallops  ABDOMEN: Soft, Nontender, Nondistended; Bowel sounds present  EXTREMITIES: bilateral LE edema    LABS:                          RADIOLOGY & ADDITIONAL TESTS:    05-01-22 @ 07:01  -  05-02-22 @ 07:00  --------------------------------------------------------  IN:    Oral Fluid: 890 mL  Total IN: 890 mL    OUT:  Total OUT: 0 mL    Total NET: 890 mL

## 2022-05-06 LAB
GLUCOSE BLDC GLUCOMTR-MCNC: 134 MG/DL — HIGH (ref 70–99)
GLUCOSE BLDC GLUCOMTR-MCNC: 143 MG/DL — HIGH (ref 70–99)
GLUCOSE BLDC GLUCOMTR-MCNC: 178 MG/DL — HIGH (ref 70–99)
GLUCOSE BLDC GLUCOMTR-MCNC: 205 MG/DL — HIGH (ref 70–99)

## 2022-05-06 PROCEDURE — 99231 SBSQ HOSP IP/OBS SF/LOW 25: CPT

## 2022-05-06 RX ADMIN — Medication 8 UNIT(S): at 08:20

## 2022-05-06 RX ADMIN — ATORVASTATIN CALCIUM 40 MILLIGRAM(S): 80 TABLET, FILM COATED ORAL at 21:56

## 2022-05-06 RX ADMIN — Medication 8 UNIT(S): at 16:41

## 2022-05-06 RX ADMIN — GABAPENTIN 100 MILLIGRAM(S): 400 CAPSULE ORAL at 15:05

## 2022-05-06 RX ADMIN — Medication 8 UNIT(S): at 11:40

## 2022-05-06 RX ADMIN — Medication 25 MILLIGRAM(S): at 06:09

## 2022-05-06 RX ADMIN — GABAPENTIN 100 MILLIGRAM(S): 400 CAPSULE ORAL at 21:56

## 2022-05-06 RX ADMIN — Medication 81 MILLIGRAM(S): at 11:41

## 2022-05-06 RX ADMIN — NYSTATIN CREAM 1 APPLICATION(S): 100000 CREAM TOPICAL at 21:59

## 2022-05-06 RX ADMIN — NYSTATIN CREAM 1 APPLICATION(S): 100000 CREAM TOPICAL at 15:14

## 2022-05-06 RX ADMIN — NYSTATIN CREAM 1 APPLICATION(S): 100000 CREAM TOPICAL at 06:09

## 2022-05-06 RX ADMIN — Medication 2 MILLIGRAM(S): at 11:41

## 2022-05-06 RX ADMIN — ENOXAPARIN SODIUM 40 MILLIGRAM(S): 100 INJECTION SUBCUTANEOUS at 11:41

## 2022-05-06 RX ADMIN — INSULIN GLARGINE 30 UNIT(S): 100 INJECTION, SOLUTION SUBCUTANEOUS at 21:50

## 2022-05-06 RX ADMIN — Medication 25 MILLIGRAM(S): at 06:08

## 2022-05-06 RX ADMIN — AMLODIPINE BESYLATE 10 MILLIGRAM(S): 2.5 TABLET ORAL at 06:08

## 2022-05-06 RX ADMIN — MEMANTINE HYDROCHLORIDE 10 MILLIGRAM(S): 10 TABLET ORAL at 15:05

## 2022-05-06 RX ADMIN — Medication 2: at 11:40

## 2022-05-06 RX ADMIN — GABAPENTIN 100 MILLIGRAM(S): 400 CAPSULE ORAL at 06:08

## 2022-05-06 RX ADMIN — Medication 25 MILLIGRAM(S): at 17:57

## 2022-05-06 RX ADMIN — DONEPEZIL HYDROCHLORIDE 10 MILLIGRAM(S): 10 TABLET, FILM COATED ORAL at 21:57

## 2022-05-06 RX ADMIN — LISINOPRIL 20 MILLIGRAM(S): 2.5 TABLET ORAL at 06:07

## 2022-05-06 NOTE — PROGRESS NOTE ADULT - SUBJECTIVE AND OBJECTIVE BOX
71 yo F w/ history of dementia, HTN, IDDMII presented w/ worsened confusion. She is sitting at bedside in NAD.    MEDICATIONS  (STANDING):  amLODIPine   Tablet 10 milliGRAM(s) Oral daily  aspirin  chewable 81 milliGRAM(s) Oral daily  atorvastatin 40 milliGRAM(s) Oral at bedtime  dextrose 5%. 1000 milliLiter(s) (50 mL/Hr) IV Continuous <Continuous>  dextrose 5%. 1000 milliLiter(s) (100 mL/Hr) IV Continuous <Continuous>  dextrose 50% Injectable 25 Gram(s) IV Push once  dextrose 50% Injectable 12.5 Gram(s) IV Push once  dextrose 50% Injectable 25 Gram(s) IV Push once  donepezil 10 milliGRAM(s) Oral at bedtime  enoxaparin Injectable 40 milliGRAM(s) SubCutaneous every 24 hours  folic acid 2 milliGRAM(s) Oral daily  gabapentin 100 milliGRAM(s) Oral three times a day  glucagon  Injectable 1 milliGRAM(s) IntraMuscular once  hydrALAZINE 25 milliGRAM(s) Oral every 8 hours  hydrOXYzine hydrochloride 25 milliGRAM(s) Oral two times a day  insulin glargine Injectable (LANTUS) 30 Unit(s) SubCutaneous at bedtime  insulin lispro (ADMELOG) corrective regimen sliding scale   SubCutaneous three times a day before meals  insulin lispro (ADMELOG) corrective regimen sliding scale   SubCutaneous at bedtime  insulin lispro Injectable (ADMELOG) 8 Unit(s) SubCutaneous three times a day before meals  lisinopril 20 milliGRAM(s) Oral daily  memantine 10 milliGRAM(s) Oral daily  nystatin Powder 1 Application(s) Topical three times a day    MEDICATIONS  (PRN):  acetaminophen     Tablet .. 650 milliGRAM(s) Oral every 6 hours PRN Temp greater or equal to 38C (100.4F), Mild Pain (1 - 3)  aluminum hydroxide/magnesium hydroxide/simethicone Suspension 30 milliLiter(s) Oral every 4 hours PRN Dyspepsia  dextrose Oral Gel 15 Gram(s) Oral once PRN Blood Glucose LESS THAN 70 milliGRAM(s)/deciliter  melatonin 3 milliGRAM(s) Oral at bedtime PRN Insomnia  ondansetron Injectable 4 milliGRAM(s) IV Push every 8 hours PRN Nausea and/or Vomiting        Allergies    No Known Allergies    Intolerances        Vital Signs Last 24 Hrs  T(C): 36.2 (06 May 2022 05:20), Max: 36.8 (05 May 2022 12:08)  T(F): 97.2 (06 May 2022 05:20), Max: 98.2 (05 May 2022 12:08)  HR: 77 (06 May 2022 05:20) (77 - 91)  BP: 154/81 (06 May 2022 05:20) (127/72 - 154/81)  BP(mean): --  RR: 17 (06 May 2022 05:20) (17 - 18)  SpO2: 99% (06 May 2022 05:20) (98% - 100%)    PHYSICAL EXAM:  GENERAL: NAD, well-groomed, well-developed  HEAD:  Atraumatic, Normocephalic  EYES: EOMI, PERRLA   NECK: Supple   NERVOUS SYSTEM:  Alert & not oriented  CHEST/LUNG: Clear to auscultation bilaterally; No rales, rhonchi, wheezing, or rubs  HEART: Regular rate and rhythm; No murmurs, rubs, or gallops  ABDOMEN: Soft, Nontender, Nondistended; Bowel sounds present  EXTREMITIES: bilateral LE edema    LABS:                          RADIOLOGY & ADDITIONAL TESTS:    05-01-22 @ 07:01  -  05-02-22 @ 07:00  --------------------------------------------------------  IN:    Oral Fluid: 890 mL  Total IN: 890 mL    OUT:  Total OUT: 0 mL    Total NET: 890 mL

## 2022-05-07 LAB
GLUCOSE BLDC GLUCOMTR-MCNC: 118 MG/DL — HIGH (ref 70–99)
GLUCOSE BLDC GLUCOMTR-MCNC: 217 MG/DL — HIGH (ref 70–99)
GLUCOSE BLDC GLUCOMTR-MCNC: 322 MG/DL — HIGH (ref 70–99)
GLUCOSE BLDC GLUCOMTR-MCNC: 334 MG/DL — HIGH (ref 70–99)

## 2022-05-07 PROCEDURE — 99231 SBSQ HOSP IP/OBS SF/LOW 25: CPT

## 2022-05-07 RX ADMIN — GABAPENTIN 100 MILLIGRAM(S): 400 CAPSULE ORAL at 13:59

## 2022-05-07 RX ADMIN — LISINOPRIL 20 MILLIGRAM(S): 2.5 TABLET ORAL at 06:03

## 2022-05-07 RX ADMIN — Medication 8 UNIT(S): at 07:33

## 2022-05-07 RX ADMIN — Medication 2: at 21:55

## 2022-05-07 RX ADMIN — Medication 25 MILLIGRAM(S): at 06:03

## 2022-05-07 RX ADMIN — AMLODIPINE BESYLATE 10 MILLIGRAM(S): 2.5 TABLET ORAL at 06:03

## 2022-05-07 RX ADMIN — NYSTATIN CREAM 1 APPLICATION(S): 100000 CREAM TOPICAL at 06:04

## 2022-05-07 RX ADMIN — GABAPENTIN 100 MILLIGRAM(S): 400 CAPSULE ORAL at 22:56

## 2022-05-07 RX ADMIN — NYSTATIN CREAM 1 APPLICATION(S): 100000 CREAM TOPICAL at 13:59

## 2022-05-07 RX ADMIN — NYSTATIN CREAM 1 APPLICATION(S): 100000 CREAM TOPICAL at 23:34

## 2022-05-07 RX ADMIN — MEMANTINE HYDROCHLORIDE 10 MILLIGRAM(S): 10 TABLET ORAL at 11:12

## 2022-05-07 RX ADMIN — Medication 2 MILLIGRAM(S): at 11:12

## 2022-05-07 RX ADMIN — Medication 8 UNIT(S): at 17:10

## 2022-05-07 RX ADMIN — GABAPENTIN 100 MILLIGRAM(S): 400 CAPSULE ORAL at 06:02

## 2022-05-07 RX ADMIN — Medication 81 MILLIGRAM(S): at 11:12

## 2022-05-07 RX ADMIN — Medication 25 MILLIGRAM(S): at 17:35

## 2022-05-07 RX ADMIN — ATORVASTATIN CALCIUM 40 MILLIGRAM(S): 80 TABLET, FILM COATED ORAL at 22:56

## 2022-05-07 RX ADMIN — DONEPEZIL HYDROCHLORIDE 10 MILLIGRAM(S): 10 TABLET, FILM COATED ORAL at 22:56

## 2022-05-07 RX ADMIN — Medication 8 UNIT(S): at 11:13

## 2022-05-07 RX ADMIN — ENOXAPARIN SODIUM 40 MILLIGRAM(S): 100 INJECTION SUBCUTANEOUS at 11:14

## 2022-05-07 RX ADMIN — Medication 2: at 17:10

## 2022-05-07 RX ADMIN — Medication 4: at 11:13

## 2022-05-07 RX ADMIN — INSULIN GLARGINE 30 UNIT(S): 100 INJECTION, SOLUTION SUBCUTANEOUS at 21:54

## 2022-05-07 NOTE — PROGRESS NOTE ADULT - ASSESSMENT
73 yo F w/ history of dementia, HTN, IDDMII presented w/ worsened confusion.     Progressive decline of alzheimer's vs frontaltemporal dementia  - daughter reports patient has been seen by x neurologists and was tried on aricept and namenda in hopes it would slow the decline  - Neurology appreciated   - no signs of infectious cause   - MRI was motion degraded but showed no definite intracranial hemorrhage or acute infarction  - extensive counseling given to patients daughter; she understands patients progression as per EMR  - c/w Atarax, Aricept and Namenda  - given elevated homocysteine and normal MMA patient likely had folate deficiency - will start folate    Hyponatremia  - resolved  - related to HCTZ    HTN w/ Hypertensive urgency POA  - c/w lisinopril, hydralazine & Norvasc  - Echo showed EF  60-65% w/ grade I diastolic dysfunction, trace mitral valve regurgitation and mild tricuspid regurgitation  - suspect pt's pedal edema is due to Norvasc - but will c/w this medication as it does not appear to be a problem for the patient    HLD  - c/w Lipitor    IDDM type II  - c/w Lantus, mealtime lispro & ISS   - c/w gabapentin  - Hgb A1C is 9.8    Moderate protein-calorie malnutrition w/ Dysphagia  - c/w diet puree, mod thick as per speech eval    Prophylaxis:  DVT: Lovenox  GI: PO diet    Dispo PT recommending SNF & the pt was accepted to facility awaiting insurance approval.

## 2022-05-07 NOTE — PROGRESS NOTE ADULT - SUBJECTIVE AND OBJECTIVE BOX
71 yo F w/ history of dementia, HTN, IDDMII presented w/ worsened confusion. She is sitting at bedside in NAD.    MEDICATIONS  (STANDING):  amLODIPine   Tablet 10 milliGRAM(s) Oral daily  aspirin  chewable 81 milliGRAM(s) Oral daily  atorvastatin 40 milliGRAM(s) Oral at bedtime  dextrose 5%. 1000 milliLiter(s) (50 mL/Hr) IV Continuous <Continuous>  dextrose 5%. 1000 milliLiter(s) (100 mL/Hr) IV Continuous <Continuous>  dextrose 50% Injectable 25 Gram(s) IV Push once  dextrose 50% Injectable 12.5 Gram(s) IV Push once  dextrose 50% Injectable 25 Gram(s) IV Push once  donepezil 10 milliGRAM(s) Oral at bedtime  enoxaparin Injectable 40 milliGRAM(s) SubCutaneous every 24 hours  folic acid 2 milliGRAM(s) Oral daily  gabapentin 100 milliGRAM(s) Oral three times a day  glucagon  Injectable 1 milliGRAM(s) IntraMuscular once  hydrALAZINE 25 milliGRAM(s) Oral every 8 hours  hydrOXYzine hydrochloride 25 milliGRAM(s) Oral two times a day  insulin glargine Injectable (LANTUS) 30 Unit(s) SubCutaneous at bedtime  insulin lispro (ADMELOG) corrective regimen sliding scale   SubCutaneous three times a day before meals  insulin lispro (ADMELOG) corrective regimen sliding scale   SubCutaneous at bedtime  insulin lispro Injectable (ADMELOG) 8 Unit(s) SubCutaneous three times a day before meals  lisinopril 20 milliGRAM(s) Oral daily  memantine 10 milliGRAM(s) Oral daily  nystatin Powder 1 Application(s) Topical three times a day    MEDICATIONS  (PRN):  acetaminophen     Tablet .. 650 milliGRAM(s) Oral every 6 hours PRN Temp greater or equal to 38C (100.4F), Mild Pain (1 - 3)  aluminum hydroxide/magnesium hydroxide/simethicone Suspension 30 milliLiter(s) Oral every 4 hours PRN Dyspepsia  dextrose Oral Gel 15 Gram(s) Oral once PRN Blood Glucose LESS THAN 70 milliGRAM(s)/deciliter  melatonin 3 milliGRAM(s) Oral at bedtime PRN Insomnia  ondansetron Injectable 4 milliGRAM(s) IV Push every 8 hours PRN Nausea and/or Vomiting        Allergies    No Known Allergies    Intolerances        Vital Signs Last 24 Hrs  T(C): 36.6 (07 May 2022 05:07), Max: 36.9 (06 May 2022 23:08)  T(F): 97.8 (07 May 2022 05:07), Max: 98.5 (06 May 2022 23:08)  HR: 74 (07 May 2022 05:07) (74 - 92)  BP: 148/74 (07 May 2022 05:07) (118/57 - 148/74)  BP(mean): --  RR: 18 (07 May 2022 05:07) (17 - 18)  SpO2: 99% (07 May 2022 05:07) (97% - 99%)    PHYSICAL EXAM:  GENERAL: NAD, well-groomed, well-developed  HEAD:  Atraumatic, Normocephalic  EYES: EOMI, PERRLA   NECK: Supple   NERVOUS SYSTEM:  Alert & not oriented  CHEST/LUNG: Clear to auscultation bilaterally; No rales, rhonchi, wheezing, or rubs  HEART: Regular rate and rhythm; No murmurs, rubs, or gallops  ABDOMEN: Soft, Nontender, Nondistended; Bowel sounds present  EXTREMITIES: bilateral LE edema    LABS:                            RADIOLOGY & ADDITIONAL TESTS:    05-01-22 @ 07:01  -  05-02-22 @ 07:00  --------------------------------------------------------  IN:    Oral Fluid: 890 mL  Total IN: 890 mL    OUT:  Total OUT: 0 mL    Total NET: 890 mL

## 2022-05-08 LAB
GLUCOSE BLDC GLUCOMTR-MCNC: 148 MG/DL — HIGH (ref 70–99)
GLUCOSE BLDC GLUCOMTR-MCNC: 170 MG/DL — HIGH (ref 70–99)
GLUCOSE BLDC GLUCOMTR-MCNC: 290 MG/DL — HIGH (ref 70–99)
GLUCOSE BLDC GLUCOMTR-MCNC: 312 MG/DL — HIGH (ref 70–99)

## 2022-05-08 PROCEDURE — 99231 SBSQ HOSP IP/OBS SF/LOW 25: CPT

## 2022-05-08 RX ADMIN — Medication 25 MILLIGRAM(S): at 05:34

## 2022-05-08 RX ADMIN — Medication 8 UNIT(S): at 07:48

## 2022-05-08 RX ADMIN — Medication 3: at 17:13

## 2022-05-08 RX ADMIN — Medication 0: at 22:07

## 2022-05-08 RX ADMIN — INSULIN GLARGINE 30 UNIT(S): 100 INJECTION, SOLUTION SUBCUTANEOUS at 22:06

## 2022-05-08 RX ADMIN — NYSTATIN CREAM 1 APPLICATION(S): 100000 CREAM TOPICAL at 13:35

## 2022-05-08 RX ADMIN — AMLODIPINE BESYLATE 10 MILLIGRAM(S): 2.5 TABLET ORAL at 05:34

## 2022-05-08 RX ADMIN — Medication 2 MILLIGRAM(S): at 11:41

## 2022-05-08 RX ADMIN — NYSTATIN CREAM 1 APPLICATION(S): 100000 CREAM TOPICAL at 22:04

## 2022-05-08 RX ADMIN — Medication 4: at 11:40

## 2022-05-08 RX ADMIN — GABAPENTIN 100 MILLIGRAM(S): 400 CAPSULE ORAL at 05:34

## 2022-05-08 RX ADMIN — Medication 8 UNIT(S): at 17:14

## 2022-05-08 RX ADMIN — DONEPEZIL HYDROCHLORIDE 10 MILLIGRAM(S): 10 TABLET, FILM COATED ORAL at 22:06

## 2022-05-08 RX ADMIN — NYSTATIN CREAM 1 APPLICATION(S): 100000 CREAM TOPICAL at 05:34

## 2022-05-08 RX ADMIN — ENOXAPARIN SODIUM 40 MILLIGRAM(S): 100 INJECTION SUBCUTANEOUS at 11:39

## 2022-05-08 RX ADMIN — GABAPENTIN 100 MILLIGRAM(S): 400 CAPSULE ORAL at 13:34

## 2022-05-08 RX ADMIN — MEMANTINE HYDROCHLORIDE 10 MILLIGRAM(S): 10 TABLET ORAL at 11:42

## 2022-05-08 RX ADMIN — ATORVASTATIN CALCIUM 40 MILLIGRAM(S): 80 TABLET, FILM COATED ORAL at 22:05

## 2022-05-08 RX ADMIN — Medication 81 MILLIGRAM(S): at 11:41

## 2022-05-08 RX ADMIN — GABAPENTIN 100 MILLIGRAM(S): 400 CAPSULE ORAL at 22:05

## 2022-05-08 RX ADMIN — LISINOPRIL 20 MILLIGRAM(S): 2.5 TABLET ORAL at 05:33

## 2022-05-08 RX ADMIN — Medication 8 UNIT(S): at 11:41

## 2022-05-08 RX ADMIN — Medication 25 MILLIGRAM(S): at 17:15

## 2022-05-08 NOTE — PROGRESS NOTE ADULT - SUBJECTIVE AND OBJECTIVE BOX
73 yo F w/ history of dementia, HTN, IDDMII presented w/ worsened confusion. She is sitting at bedside in NAD.    MEDICATIONS  (STANDING):  amLODIPine   Tablet 10 milliGRAM(s) Oral daily  aspirin  chewable 81 milliGRAM(s) Oral daily  atorvastatin 40 milliGRAM(s) Oral at bedtime  dextrose 5%. 1000 milliLiter(s) (50 mL/Hr) IV Continuous <Continuous>  dextrose 5%. 1000 milliLiter(s) (100 mL/Hr) IV Continuous <Continuous>  dextrose 50% Injectable 25 Gram(s) IV Push once  dextrose 50% Injectable 12.5 Gram(s) IV Push once  dextrose 50% Injectable 25 Gram(s) IV Push once  donepezil 10 milliGRAM(s) Oral at bedtime  enoxaparin Injectable 40 milliGRAM(s) SubCutaneous every 24 hours  folic acid 2 milliGRAM(s) Oral daily  gabapentin 100 milliGRAM(s) Oral three times a day  glucagon  Injectable 1 milliGRAM(s) IntraMuscular once  hydrALAZINE 25 milliGRAM(s) Oral every 8 hours  hydrOXYzine hydrochloride 25 milliGRAM(s) Oral two times a day  insulin glargine Injectable (LANTUS) 30 Unit(s) SubCutaneous at bedtime  insulin lispro (ADMELOG) corrective regimen sliding scale   SubCutaneous three times a day before meals  insulin lispro (ADMELOG) corrective regimen sliding scale   SubCutaneous at bedtime  insulin lispro Injectable (ADMELOG) 8 Unit(s) SubCutaneous three times a day before meals  lisinopril 20 milliGRAM(s) Oral daily  memantine 10 milliGRAM(s) Oral daily  nystatin Powder 1 Application(s) Topical three times a day    MEDICATIONS  (PRN):  acetaminophen     Tablet .. 650 milliGRAM(s) Oral every 6 hours PRN Temp greater or equal to 38C (100.4F), Mild Pain (1 - 3)  aluminum hydroxide/magnesium hydroxide/simethicone Suspension 30 milliLiter(s) Oral every 4 hours PRN Dyspepsia  dextrose Oral Gel 15 Gram(s) Oral once PRN Blood Glucose LESS THAN 70 milliGRAM(s)/deciliter  melatonin 3 milliGRAM(s) Oral at bedtime PRN Insomnia  ondansetron Injectable 4 milliGRAM(s) IV Push every 8 hours PRN Nausea and/or Vomiting        Allergies    No Known Allergies    Intolerances        vss    PHYSICAL EXAM:  GENERAL: NAD, well-groomed, well-developed  HEAD:  Atraumatic, Normocephalic  EYES: EOMI, PERRLA   NECK: Supple   NERVOUS SYSTEM:  Alert & not oriented  CHEST/LUNG: Clear to auscultation bilaterally; No rales, rhonchi, wheezing, or rubs  HEART: Regular rate and rhythm; No murmurs, rubs, or gallops  ABDOMEN: Soft, Nontender, Nondistended; Bowel sounds present  EXTREMITIES: bilateral LE edema    LABS:                            RADIOLOGY & ADDITIONAL TESTS:    05-01-22 @ 07:01  -  05-02-22 @ 07:00  --------------------------------------------------------  IN:    Oral Fluid: 890 mL  Total IN: 890 mL    OUT:  Total OUT: 0 mL    Total NET: 890 mL

## 2022-05-09 LAB
GLUCOSE BLDC GLUCOMTR-MCNC: 177 MG/DL — HIGH (ref 70–99)
GLUCOSE BLDC GLUCOMTR-MCNC: 192 MG/DL — HIGH (ref 70–99)
GLUCOSE BLDC GLUCOMTR-MCNC: 333 MG/DL — HIGH (ref 70–99)
GLUCOSE BLDC GLUCOMTR-MCNC: 389 MG/DL — HIGH (ref 70–99)
GLUCOSE BLDC GLUCOMTR-MCNC: 441 MG/DL — HIGH (ref 70–99)

## 2022-05-09 PROCEDURE — 99231 SBSQ HOSP IP/OBS SF/LOW 25: CPT

## 2022-05-09 RX ORDER — INSULIN LISPRO 100/ML
10 VIAL (ML) SUBCUTANEOUS
Qty: 0 | Refills: 0 | DISCHARGE
Start: 2022-05-09

## 2022-05-09 RX ORDER — INSULIN LISPRO 100/ML
10 VIAL (ML) SUBCUTANEOUS
Refills: 0 | Status: DISCONTINUED | OUTPATIENT
Start: 2022-05-09 | End: 2022-05-11

## 2022-05-09 RX ADMIN — Medication 25 MILLIGRAM(S): at 23:14

## 2022-05-09 RX ADMIN — Medication 10 UNIT(S): at 12:04

## 2022-05-09 RX ADMIN — Medication 5: at 12:04

## 2022-05-09 RX ADMIN — Medication 2: at 22:01

## 2022-05-09 RX ADMIN — DONEPEZIL HYDROCHLORIDE 10 MILLIGRAM(S): 10 TABLET, FILM COATED ORAL at 23:15

## 2022-05-09 RX ADMIN — GABAPENTIN 100 MILLIGRAM(S): 400 CAPSULE ORAL at 06:00

## 2022-05-09 RX ADMIN — Medication 1: at 08:16

## 2022-05-09 RX ADMIN — GABAPENTIN 100 MILLIGRAM(S): 400 CAPSULE ORAL at 17:08

## 2022-05-09 RX ADMIN — GABAPENTIN 100 MILLIGRAM(S): 400 CAPSULE ORAL at 23:14

## 2022-05-09 RX ADMIN — Medication 81 MILLIGRAM(S): at 12:05

## 2022-05-09 RX ADMIN — ENOXAPARIN SODIUM 40 MILLIGRAM(S): 100 INJECTION SUBCUTANEOUS at 12:05

## 2022-05-09 RX ADMIN — ATORVASTATIN CALCIUM 40 MILLIGRAM(S): 80 TABLET, FILM COATED ORAL at 23:15

## 2022-05-09 RX ADMIN — NYSTATIN CREAM 1 APPLICATION(S): 100000 CREAM TOPICAL at 17:20

## 2022-05-09 RX ADMIN — MEMANTINE HYDROCHLORIDE 10 MILLIGRAM(S): 10 TABLET ORAL at 12:05

## 2022-05-09 RX ADMIN — NYSTATIN CREAM 1 APPLICATION(S): 100000 CREAM TOPICAL at 23:15

## 2022-05-09 RX ADMIN — AMLODIPINE BESYLATE 10 MILLIGRAM(S): 2.5 TABLET ORAL at 06:00

## 2022-05-09 RX ADMIN — Medication 10 UNIT(S): at 17:02

## 2022-05-09 RX ADMIN — Medication 8 UNIT(S): at 08:16

## 2022-05-09 RX ADMIN — Medication 2 MILLIGRAM(S): at 12:40

## 2022-05-09 RX ADMIN — NYSTATIN CREAM 1 APPLICATION(S): 100000 CREAM TOPICAL at 06:00

## 2022-05-09 RX ADMIN — LISINOPRIL 20 MILLIGRAM(S): 2.5 TABLET ORAL at 06:00

## 2022-05-09 RX ADMIN — Medication 25 MILLIGRAM(S): at 18:45

## 2022-05-09 RX ADMIN — Medication 25 MILLIGRAM(S): at 06:00

## 2022-05-09 RX ADMIN — Medication 25 MILLIGRAM(S): at 06:04

## 2022-05-09 RX ADMIN — INSULIN GLARGINE 30 UNIT(S): 100 INJECTION, SOLUTION SUBCUTANEOUS at 22:00

## 2022-05-09 RX ADMIN — Medication 1: at 17:01

## 2022-05-10 LAB
GLUCOSE BLDC GLUCOMTR-MCNC: 144 MG/DL — HIGH (ref 70–99)
GLUCOSE BLDC GLUCOMTR-MCNC: 227 MG/DL — HIGH (ref 70–99)
GLUCOSE BLDC GLUCOMTR-MCNC: 289 MG/DL — HIGH (ref 70–99)
GLUCOSE BLDC GLUCOMTR-MCNC: 335 MG/DL — HIGH (ref 70–99)

## 2022-05-10 PROCEDURE — 99231 SBSQ HOSP IP/OBS SF/LOW 25: CPT

## 2022-05-10 RX ADMIN — Medication 81 MILLIGRAM(S): at 12:26

## 2022-05-10 RX ADMIN — GABAPENTIN 100 MILLIGRAM(S): 400 CAPSULE ORAL at 21:47

## 2022-05-10 RX ADMIN — Medication 3: at 16:58

## 2022-05-10 RX ADMIN — Medication 25 MILLIGRAM(S): at 17:49

## 2022-05-10 RX ADMIN — GABAPENTIN 100 MILLIGRAM(S): 400 CAPSULE ORAL at 06:46

## 2022-05-10 RX ADMIN — Medication 4: at 12:25

## 2022-05-10 RX ADMIN — Medication 25 MILLIGRAM(S): at 06:46

## 2022-05-10 RX ADMIN — MEMANTINE HYDROCHLORIDE 10 MILLIGRAM(S): 10 TABLET ORAL at 12:26

## 2022-05-10 RX ADMIN — Medication 10 UNIT(S): at 08:39

## 2022-05-10 RX ADMIN — NYSTATIN CREAM 1 APPLICATION(S): 100000 CREAM TOPICAL at 14:04

## 2022-05-10 RX ADMIN — INSULIN GLARGINE 30 UNIT(S): 100 INJECTION, SOLUTION SUBCUTANEOUS at 21:41

## 2022-05-10 RX ADMIN — Medication 2 MILLIGRAM(S): at 12:26

## 2022-05-10 RX ADMIN — Medication 10 UNIT(S): at 12:25

## 2022-05-10 RX ADMIN — NYSTATIN CREAM 1 APPLICATION(S): 100000 CREAM TOPICAL at 06:47

## 2022-05-10 RX ADMIN — AMLODIPINE BESYLATE 10 MILLIGRAM(S): 2.5 TABLET ORAL at 06:46

## 2022-05-10 RX ADMIN — NYSTATIN CREAM 1 APPLICATION(S): 100000 CREAM TOPICAL at 21:44

## 2022-05-10 RX ADMIN — Medication 10 UNIT(S): at 16:58

## 2022-05-10 RX ADMIN — GABAPENTIN 100 MILLIGRAM(S): 400 CAPSULE ORAL at 14:04

## 2022-05-10 RX ADMIN — LISINOPRIL 20 MILLIGRAM(S): 2.5 TABLET ORAL at 06:47

## 2022-05-10 RX ADMIN — DONEPEZIL HYDROCHLORIDE 10 MILLIGRAM(S): 10 TABLET, FILM COATED ORAL at 21:46

## 2022-05-10 RX ADMIN — ENOXAPARIN SODIUM 40 MILLIGRAM(S): 100 INJECTION SUBCUTANEOUS at 12:26

## 2022-05-10 RX ADMIN — Medication 25 MILLIGRAM(S): at 06:47

## 2022-05-10 RX ADMIN — ATORVASTATIN CALCIUM 40 MILLIGRAM(S): 80 TABLET, FILM COATED ORAL at 21:47

## 2022-05-10 NOTE — PROGRESS NOTE ADULT - SUBJECTIVE AND OBJECTIVE BOX
71 yo F w/ history of dementia, HTN, IDDMII presented w/ worsened confusion. She is sitting at bedside in NAD.    MEDICATIONS  (STANDING):  amLODIPine   Tablet 10 milliGRAM(s) Oral daily  aspirin  chewable 81 milliGRAM(s) Oral daily  atorvastatin 40 milliGRAM(s) Oral at bedtime  dextrose 5%. 1000 milliLiter(s) (50 mL/Hr) IV Continuous <Continuous>  dextrose 5%. 1000 milliLiter(s) (100 mL/Hr) IV Continuous <Continuous>  dextrose 50% Injectable 25 Gram(s) IV Push once  dextrose 50% Injectable 12.5 Gram(s) IV Push once  dextrose 50% Injectable 25 Gram(s) IV Push once  donepezil 10 milliGRAM(s) Oral at bedtime  enoxaparin Injectable 40 milliGRAM(s) SubCutaneous every 24 hours  folic acid 2 milliGRAM(s) Oral daily  gabapentin 100 milliGRAM(s) Oral three times a day  glucagon  Injectable 1 milliGRAM(s) IntraMuscular once  hydrALAZINE 25 milliGRAM(s) Oral every 8 hours  hydrOXYzine hydrochloride 25 milliGRAM(s) Oral two times a day  insulin glargine Injectable (LANTUS) 30 Unit(s) SubCutaneous at bedtime  insulin lispro (ADMELOG) corrective regimen sliding scale   SubCutaneous three times a day before meals  insulin lispro (ADMELOG) corrective regimen sliding scale   SubCutaneous at bedtime  insulin lispro Injectable (ADMELOG) 8 Unit(s) SubCutaneous three times a day before meals  lisinopril 20 milliGRAM(s) Oral daily  memantine 10 milliGRAM(s) Oral daily  nystatin Powder 1 Application(s) Topical three times a day    MEDICATIONS  (PRN):  acetaminophen     Tablet .. 650 milliGRAM(s) Oral every 6 hours PRN Temp greater or equal to 38C (100.4F), Mild Pain (1 - 3)  aluminum hydroxide/magnesium hydroxide/simethicone Suspension 30 milliLiter(s) Oral every 4 hours PRN Dyspepsia  dextrose Oral Gel 15 Gram(s) Oral once PRN Blood Glucose LESS THAN 70 milliGRAM(s)/deciliter  melatonin 3 milliGRAM(s) Oral at bedtime PRN Insomnia  ondansetron Injectable 4 milliGRAM(s) IV Push every 8 hours PRN Nausea and/or Vomiting        Allergies    No Known Allergies    Intolerances        vss    PHYSICAL EXAM:  GENERAL: NAD, well-groomed, well-developed  HEAD:  Atraumatic, Normocephalic  EYES: EOMI, PERRLA   NECK: Supple   NERVOUS SYSTEM:  Alert & not oriented  CHEST/LUNG: Clear to auscultation bilaterally; No rales, rhonchi, wheezing, or rubs  HEART: Regular rate and rhythm; No murmurs, rubs, or gallops  ABDOMEN: Soft, Nontender, Nondistended; Bowel sounds present  EXTREMITIES: bilateral LE edema    LABS:                            RADIOLOGY & ADDITIONAL TESTS:    05-01-22 @ 07:01  -  05-02-22 @ 07:00  --------------------------------------------------------  IN:    Oral Fluid: 890 mL  Total IN: 890 mL    OUT:  Total OUT: 0 mL    Total NET: 890 mL

## 2022-05-11 ENCOUNTER — TRANSCRIPTION ENCOUNTER (OUTPATIENT)
Age: 72
End: 2022-05-11

## 2022-05-11 VITALS
HEART RATE: 81 BPM | RESPIRATION RATE: 18 BRPM | OXYGEN SATURATION: 95 % | DIASTOLIC BLOOD PRESSURE: 70 MMHG | SYSTOLIC BLOOD PRESSURE: 152 MMHG

## 2022-05-11 LAB
FLUAV AG NPH QL: SIGNIFICANT CHANGE UP
FLUBV AG NPH QL: SIGNIFICANT CHANGE UP
GLUCOSE BLDC GLUCOMTR-MCNC: 159 MG/DL — HIGH (ref 70–99)
GLUCOSE BLDC GLUCOMTR-MCNC: 264 MG/DL — HIGH (ref 70–99)
GLUCOSE BLDC GLUCOMTR-MCNC: 279 MG/DL — HIGH (ref 70–99)
SARS-COV-2 RNA SPEC QL NAA+PROBE: SIGNIFICANT CHANGE UP

## 2022-05-11 PROCEDURE — 99239 HOSP IP/OBS DSCHRG MGMT >30: CPT

## 2022-05-11 RX ORDER — DULAGLUTIDE 4.5 MG/.5ML
3 INJECTION, SOLUTION SUBCUTANEOUS
Qty: 0 | Refills: 0 | DISCHARGE

## 2022-05-11 RX ORDER — INSULIN GLARGINE 100 [IU]/ML
35 INJECTION, SOLUTION SUBCUTANEOUS
Qty: 1 | Refills: 0
Start: 2022-05-11 | End: 2022-06-09

## 2022-05-11 RX ORDER — HYDRALAZINE HCL 50 MG
1 TABLET ORAL
Qty: 60 | Refills: 0
Start: 2022-05-11 | End: 2022-06-09

## 2022-05-11 RX ADMIN — Medication 1: at 08:34

## 2022-05-11 RX ADMIN — Medication 2 MILLIGRAM(S): at 11:38

## 2022-05-11 RX ADMIN — Medication 3: at 16:47

## 2022-05-11 RX ADMIN — AMLODIPINE BESYLATE 10 MILLIGRAM(S): 2.5 TABLET ORAL at 05:15

## 2022-05-11 RX ADMIN — Medication 10 UNIT(S): at 11:40

## 2022-05-11 RX ADMIN — Medication 81 MILLIGRAM(S): at 11:37

## 2022-05-11 RX ADMIN — GABAPENTIN 100 MILLIGRAM(S): 400 CAPSULE ORAL at 05:15

## 2022-05-11 RX ADMIN — GABAPENTIN 100 MILLIGRAM(S): 400 CAPSULE ORAL at 14:23

## 2022-05-11 RX ADMIN — Medication 3: at 11:39

## 2022-05-11 RX ADMIN — NYSTATIN CREAM 1 APPLICATION(S): 100000 CREAM TOPICAL at 05:16

## 2022-05-11 RX ADMIN — LISINOPRIL 20 MILLIGRAM(S): 2.5 TABLET ORAL at 05:15

## 2022-05-11 RX ADMIN — ENOXAPARIN SODIUM 40 MILLIGRAM(S): 100 INJECTION SUBCUTANEOUS at 11:09

## 2022-05-11 RX ADMIN — Medication 10 UNIT(S): at 16:47

## 2022-05-11 RX ADMIN — Medication 25 MILLIGRAM(S): at 05:15

## 2022-05-11 RX ADMIN — Medication 10 UNIT(S): at 08:34

## 2022-05-11 RX ADMIN — MEMANTINE HYDROCHLORIDE 10 MILLIGRAM(S): 10 TABLET ORAL at 11:38

## 2022-05-11 RX ADMIN — NYSTATIN CREAM 1 APPLICATION(S): 100000 CREAM TOPICAL at 14:23

## 2022-05-11 NOTE — PROGRESS NOTE ADULT - TIME BILLING
min spent reviewing chart, examining patient, discussing plan with patient and family
spent coordinating care d/w SW and CM on dispo planning

## 2022-05-11 NOTE — PROGRESS NOTE ADULT - PROVIDER SPECIALTY LIST ADULT
Hospitalist
Hospitalist
Internal Medicine
Neurology
Neurology
Hospitalist
Internal Medicine
Internal Medicine
Hospitalist
Internal Medicine
Internal Medicine
Hospitalist
Hospitalist
Internal Medicine
Hospitalist

## 2022-05-11 NOTE — PROGRESS NOTE ADULT - ASSESSMENT
73 yo F w/ history of dementia, HTN, IDDMII presented w/ worsened confusion.     Progressive decline of alzheimer's vs frontaltemporal dementia-  per documentation by my collegues the - daughter reports patient has been seen by x neurologists and was tried on aricept and namenda in hopes it would slow the decline  - Neurology consult appreciated  noted  - no signs of infectious cause   - MRI was motion degraded but showed no definite intracranial hemorrhage or acute infarction  -also per documentation by my colleagues they have had  extensive counseling given to patients daughter; she understands patients progression as per EMR  - c/w Atarax, Aricept and Namenda  was started on folic acid by m,y colleagues  given elevated homocysteine and normal MMA patient likely had folate deficiency - will start folate    Hyponatremia  - resolved      HTN w/ Hypertensive urgency POA- BP is better   - c/w lisinopril, hydralazine & Norvasc  - Echo showed EF  60-65% w/ grade I diastolic dysfunction, trace mitral valve regurgitation and mild tricuspid regurgitation  - per my colleague's documentation suspect pt's pedal edema is due to Norvasc - but it was continued  as it does not appear to be a problem for the patient    HLD  - c/w Lipitor    IDDM type II  - c/w Lantus, mealtime lispro & ISS   - c/w gabapentin  - Hgb A1C is 9.8    Moderate protein-calorie malnutrition w/ Dysphagia  - c/w diet puree, mod thick as per speech eval    Prophylaxis:  DVT: Lovenox  GI: PO diet    Dispo PT recommending SNF & the pt was accepted to facility awaiting insurance approval.  73 yo F w/ history of dementia, HTN, IDDMII presented w/ worsened confusion.     Progressive decline of alzheimer's vs frontaltemporal dementia-  per documentation by my collegues the - daughter reports patient has been seen by x neurologists and was tried on aricept and namenda in hopes it would slow the decline  - Neurology consult appreciated  noted  - no signs of infectious cause   - MRI was motion degraded but showed no definite intracranial hemorrhage or acute infarction  -also per documentation by my colleagues they have had  extensive counseling given to patients daughter; she understands patients progression as per EMR  - c/w Atarax, Aricept and Namenda  was started on folic acid by m,y colleagues  given elevated homocysteine and normal MMA patient likely had folate deficiency - will start folate    Hyponatremia  - resolved      HTN w/ Hypertensive urgency POA- BP is better   - c/w lisinopril, hydralazine & Norvasc  - Echo showed EF  60-65% w/ grade I diastolic dysfunction, trace mitral valve regurgitation and mild tricuspid regurgitation  - per my colleague's documentation suspect pt's pedal edema is due to Norvasc - but it was continued  as it does not appear to be a problem for the patient    HLD  - c/w Lipitor    IDDM type II  - c/w Lantus, mealtime lispro & ISS   - c/w gabapentin  - Hgb A1C is 9.8    Moderate protein-calorie malnutrition w/ Dysphagia  - c/w diet puree, mod thick as per speech eval    Prophylaxis:  DVT: Lovenox  GI: PO diet    Dispo per SW/CM patient hasn't been participating in physical therapy and family and has been declined  for STR . patient to return to home with family ,. see SW and CM for more details

## 2022-05-11 NOTE — PROGRESS NOTE ADULT - NUTRITIONAL ASSESSMENT
----- Message from Joseluis Roberts MD sent at 10/8/2019  6:15 PM CDT -----  Better  If feeling ok bmp and uric acid 4 weeks    Notes recorded by Joseluis Roberts MD on 10/8/2019 at 6:14 PM CDT  Creatinine better   If feeling ok fu as sched    
This patient has been assessed with a concern for Malnutrition and has been determined to have a diagnosis/diagnoses of Moderate protein-calorie malnutrition.    This patient is being managed with:   Diet Regular-  Consistent Carbohydrate {Evening Snack}  Pureed (PUREED)  Moderately Thick Liquids (MODTHICKLIQS)  Entered: Apr 17 2022  6:19AM    

## 2022-05-11 NOTE — PROGRESS NOTE ADULT - SUBJECTIVE AND OBJECTIVE BOX
73 yo F w/ history of dementia, HTN, IDDMII presented w/ worsened confusion.       MEDICATIONS  (STANDING):  amLODIPine   Tablet 10 milliGRAM(s) Oral daily  aspirin  chewable 81 milliGRAM(s) Oral daily  atorvastatin 40 milliGRAM(s) Oral at bedtime  dextrose 5%. 1000 milliLiter(s) (50 mL/Hr) IV Continuous <Continuous>  dextrose 5%. 1000 milliLiter(s) (100 mL/Hr) IV Continuous <Continuous>  dextrose 50% Injectable 25 Gram(s) IV Push once  dextrose 50% Injectable 12.5 Gram(s) IV Push once  dextrose 50% Injectable 25 Gram(s) IV Push once  donepezil 10 milliGRAM(s) Oral at bedtime  enoxaparin Injectable 40 milliGRAM(s) SubCutaneous every 24 hours  folic acid 2 milliGRAM(s) Oral daily  gabapentin 100 milliGRAM(s) Oral three times a day  glucagon  Injectable 1 milliGRAM(s) IntraMuscular once  hydrALAZINE 25 milliGRAM(s) Oral every 8 hours  hydrOXYzine hydrochloride 25 milliGRAM(s) Oral two times a day  insulin glargine Injectable (LANTUS) 30 Unit(s) SubCutaneous at bedtime  insulin lispro (ADMELOG) corrective regimen sliding scale   SubCutaneous three times a day before meals  insulin lispro (ADMELOG) corrective regimen sliding scale   SubCutaneous at bedtime  insulin lispro Injectable (ADMELOG) 10 Unit(s) SubCutaneous three times a day before meals  lisinopril 20 milliGRAM(s) Oral daily  memantine 10 milliGRAM(s) Oral daily  nystatin Powder 1 Application(s) Topical three times a day    MEDICATIONS  (PRN):  acetaminophen     Tablet .. 650 milliGRAM(s) Oral every 6 hours PRN Temp greater or equal to 38C (100.4F), Mild Pain (1 - 3)  aluminum hydroxide/magnesium hydroxide/simethicone Suspension 30 milliLiter(s) Oral every 4 hours PRN Dyspepsia  dextrose Oral Gel 15 Gram(s) Oral once PRN Blood Glucose LESS THAN 70 milliGRAM(s)/deciliter  melatonin 3 milliGRAM(s) Oral at bedtime PRN Insomnia  ondansetron Injectable 4 milliGRAM(s) IV Push every 8 hours PRN Nausea and/or Vomiting        Allergies    No Known Allergies    Intolerances        Vital Signs Last 24 Hrs  T(C): 36.8 (05-11-22 @ 05:09), Max: 37.4 (05-10-22 @ 15:37)  T(F): 98.2 (05-11-22 @ 05:09), Max: 99.4 (05-10-22 @ 15:37)  HR: 74 (05-11-22 @ 05:09) (74 - 84)  BP: 143/76 (05-11-22 @ 05:09) (107/75 - 143/76)  BP(mean): --  RR: 18 (05-11-22 @ 05:09) (18 - 18)  SpO2: 98% (05-11-22 @ 05:09) (98% - 99%)        PHYSICAL EXAM:  GENERAL: NAD, well-groomed, well-developed  HEAD:  Atraumatic, Normocephalic  EYES: EOMI, PERRLA   NECK: Supple   NERVOUS SYSTEM:  Alert & not oriented  CHEST/LUNG: Clear to auscultation bilaterally; No rales, rhonchi, wheezing, or rubs  HEART: Regular rate and rhythm; No murmurs, rubs, or gallops  ABDOMEN: Soft, Nontender, Nondistended; Bowel sounds present  EXTREMITIES: bilateral LE edema    LABS:     no labs in several days

## 2022-05-11 NOTE — DISCHARGE NOTE NURSING/CASE MANAGEMENT/SOCIAL WORK - NSDCPEFALRISK_GEN_ALL_CORE
For information on Fall & Injury Prevention, visit: [email or web address] OR  [email or web address] OR  https://www.cdc.gov/steadi/patient.html

## 2022-05-11 NOTE — CHART NOTE - NSCHARTNOTEFT_GEN_A_CORE
Assessment:   Pt sleeping upon time of assessment.   Pt admitted c AMS, confusion worsening due to progressive decline of Alzheimer's vs frontal temporal dementia.   PMHx includes HTN, HLD, T2DM, dementia. Authorization still pending for [facility] placement.    Factors impacting intake: [ ] none [ ] nausea  [ ] vomiting [ ] diarrhea [ ] constipation  [x]chewing problems [x] swallowing issues  [x] other: AMS     Diet Presciption: Diet, Regular:   Consistent Carbohydrate {Evening Snack}  Pureed (PUREED)  Moderately Thick Liquids (MODTHICKLIQS) (04-17-22 @ 06:20)    Intake: % pt continues to have good appetite during hospitalization; requires total feeding assistance; pt consumes food family brings in    Current Weight: 62 kg (5/10); admission wt 62 kg (4/17)  % Weight Change: wt stable     Edema: 1+ right and left foot edema noted per flowsheet (5/11)    Unable to conduct nutrition focused physical examination as pt was sleeping upon time of assessment.    Pertinent Medications: MEDICATIONS  (STANDING):  amLODIPine   Tablet 10 milliGRAM(s) Oral daily  aspirin  chewable 81 milliGRAM(s) Oral daily  atorvastatin 40 milliGRAM(s) Oral at bedtime  dextrose 5%. 1000 milliLiter(s) (100 mL/Hr) IV Continuous <Continuous>  dextrose 5%. 1000 milliLiter(s) (50 mL/Hr) IV Continuous <Continuous>  dextrose 50% Injectable 25 Gram(s) IV Push once  dextrose 50% Injectable 12.5 Gram(s) IV Push once  dextrose 50% Injectable 25 Gram(s) IV Push once  donepezil 10 milliGRAM(s) Oral at bedtime  enoxaparin Injectable 40 milliGRAM(s) SubCutaneous every 24 hours  folic acid 2 milliGRAM(s) Oral daily  gabapentin 100 milliGRAM(s) Oral three times a day  glucagon  Injectable 1 milliGRAM(s) IntraMuscular once  hydrALAZINE 25 milliGRAM(s) Oral every 8 hours  hydrOXYzine hydrochloride 25 milliGRAM(s) Oral two times a day  insulin glargine Injectable (LANTUS) 30 Unit(s) SubCutaneous at bedtime  insulin lispro (ADMELOG) corrective regimen sliding scale   SubCutaneous three times a day before meals  insulin lispro (ADMELOG) corrective regimen sliding scale   SubCutaneous at bedtime  insulin lispro Injectable (ADMELOG) 10 Unit(s) SubCutaneous three times a day before meals  lisinopril 20 milliGRAM(s) Oral daily  memantine 10 milliGRAM(s) Oral daily  nystatin Powder 1 Application(s) Topical three times a day    MEDICATIONS  (PRN):  acetaminophen     Tablet .. 650 milliGRAM(s) Oral every 6 hours PRN Temp greater or equal to 38C (100.4F), Mild Pain (1 - 3)  aluminum hydroxide/magnesium hydroxide/simethicone Suspension 30 milliLiter(s) Oral every 4 hours PRN Dyspepsia  dextrose Oral Gel 15 Gram(s) Oral once PRN Blood Glucose LESS THAN 70 milliGRAM(s)/deciliter  melatonin 3 milliGRAM(s) Oral at bedtime PRN Insomnia  ondansetron Injectable 4 milliGRAM(s) IV Push every 8 hours PRN Nausea and/or Vomiting    Pertinent Labs:  04-18 Chol 157 mg/dL LDL --    HDL 44 mg/dL<L> Trig 118 mg/dL     CAPILLARY BLOOD GLUCOSE      POCT Blood Glucose.: 264 mg/dL (11 May 2022 11:18)  POCT Blood Glucose.: 159 mg/dL (11 May 2022 07:36)  POCT Blood Glucose.: 227 mg/dL (10 May 2022 21:01)  POCT Blood Glucose.: 289 mg/dL (10 May 2022 16:10)    Skin: no pressure injuries    Estimated Needs:   [x] no change since previous assessment (4/20)  [ ] recalculated:     Previous Nutrition Diagnosis:   [X]  Moderate Malnutrition in context of chronic illness  Etiology:  Inadequate energy/protein intake related to dementia & impaired nutrient absorption due to uncontrolled DM  Signs & Symptoms:  physical findings of mild/moderate fat depletion & muscle wasting as noted    GOAL:  Pt to consume >75% meals/supplements; maintain wt +/- 2# during LOS; goals met      Nutrition Diagnosis is [x] ongoing  [ ] resolved [ ] not applicable     New Nutrition Diagnosis: [x] not applicable       Interventions: continue diet rx as noted  Recommend  [ ] Change Diet To:  [ ] Nutrition Supplement  [ ] Nutrition Support  [ ] Other:     Monitoring and Evaluation:   [x] PO intake [ x ] Tolerance to diet prescription [ x ] weights [ x ] labs[ x ] follow up per protocol  [ ] other: Assessment:   Pt sleeping upon time of assessment.   Pt admitted c AMS, confusion worsening due to progressive decline of Alzheimer's vs frontal temporal dementia.   PMHx includes HTN, HLD, T2DM, dementia. Authorization still pending for [facility] placement.    Factors impacting intake: [ ] none [ ] nausea  [ ] vomiting [ ] diarrhea [ ] constipation  [x]chewing problems [x] swallowing issues  [x] other: AMS     Diet Presciption: Diet, Regular:   Consistent Carbohydrate {Evening Snack}  Pureed (PUREED)  Moderately Thick Liquids (MODTHICKLIQS) (04-17-22 @ 06:20)    Intake: % pt continues to have good appetite during hospitalization; requires total feeding assistance; pt consumes food family brings in    Current Weight: 62 kg (5/10); admission wt 62 kg (4/17)  % Weight Change: wt stable     Edema: 1+ right and left foot edema noted per flowsheet (5/11)    Unable to conduct nutrition focused physical examination as pt was sleeping upon time of assessment.    Pertinent Medications: MEDICATIONS  (STANDING):  amLODIPine   Tablet 10 milliGRAM(s) Oral daily  aspirin  chewable 81 milliGRAM(s) Oral daily  atorvastatin 40 milliGRAM(s) Oral at bedtime  dextrose 5%. 1000 milliLiter(s) (100 mL/Hr) IV Continuous <Continuous>  dextrose 5%. 1000 milliLiter(s) (50 mL/Hr) IV Continuous <Continuous>  dextrose 50% Injectable 25 Gram(s) IV Push once  dextrose 50% Injectable 12.5 Gram(s) IV Push once  dextrose 50% Injectable 25 Gram(s) IV Push once  donepezil 10 milliGRAM(s) Oral at bedtime  enoxaparin Injectable 40 milliGRAM(s) SubCutaneous every 24 hours  folic acid 2 milliGRAM(s) Oral daily  gabapentin 100 milliGRAM(s) Oral three times a day  glucagon  Injectable 1 milliGRAM(s) IntraMuscular once  hydrALAZINE 25 milliGRAM(s) Oral every 8 hours  hydrOXYzine hydrochloride 25 milliGRAM(s) Oral two times a day  insulin glargine Injectable (LANTUS) 30 Unit(s) SubCutaneous at bedtime  insulin lispro (ADMELOG) corrective regimen sliding scale   SubCutaneous three times a day before meals  insulin lispro (ADMELOG) corrective regimen sliding scale   SubCutaneous at bedtime  insulin lispro Injectable (ADMELOG) 10 Unit(s) SubCutaneous three times a day before meals  lisinopril 20 milliGRAM(s) Oral daily  memantine 10 milliGRAM(s) Oral daily  nystatin Powder 1 Application(s) Topical three times a day    MEDICATIONS  (PRN):  acetaminophen     Tablet .. 650 milliGRAM(s) Oral every 6 hours PRN Temp greater or equal to 38C (100.4F), Mild Pain (1 - 3)  aluminum hydroxide/magnesium hydroxide/simethicone Suspension 30 milliLiter(s) Oral every 4 hours PRN Dyspepsia  dextrose Oral Gel 15 Gram(s) Oral once PRN Blood Glucose LESS THAN 70 milliGRAM(s)/deciliter  melatonin 3 milliGRAM(s) Oral at bedtime PRN Insomnia  ondansetron Injectable 4 milliGRAM(s) IV Push every 8 hours PRN Nausea and/or Vomiting    Pertinent Labs:  04-18 Chol 157 mg/dL LDL --    HDL 44 mg/dL<L> Trig 118 mg/dL     CAPILLARY BLOOD GLUCOSE      POCT Blood Glucose.: 264 mg/dL (11 May 2022 11:18)  POCT Blood Glucose.: 159 mg/dL (11 May 2022 07:36)  POCT Blood Glucose.: 227 mg/dL (10 May 2022 21:01)  POCT Blood Glucose.: 289 mg/dL (10 May 2022 16:10)    Skin: no pressure injuries    Estimated Needs:   [x] no change since previous assessment (4/20)  [ ] recalculated:     Previous Nutrition Diagnosis: (as noted on 5/4)  [X]  Moderate Malnutrition in context of chronic illness  Etiology:  Inadequate energy/protein intake related to dementia & impaired nutrient absorption due to uncontrolled DM  Signs & Symptoms:  physical findings of mild/moderate fat depletion & muscle wasting as noted    GOAL:  Pt to consume >75% meals/supplements; maintain wt +/- 2# during LOS; goals met      Nutrition Diagnosis is [x] ongoing  [ ] resolved [ ] not applicable     New Nutrition Diagnosis: [x] not applicable       Interventions: continue diet rx as noted  Recommend  [ ] Change Diet To:  [ ] Nutrition Supplement  [ ] Nutrition Support  [ ] Other:     Monitoring and Evaluation:   [x] PO intake [ x ] Tolerance to diet prescription [ x ] weights [ x ] labs[ x ] follow up per protocol  [ ] other:

## 2022-05-11 NOTE — DISCHARGE NOTE NURSING/CASE MANAGEMENT/SOCIAL WORK - PATIENT PORTAL LINK FT
You can access the FollowMyHealth Patient Portal offered by Health system by registering at the following website: [email or web address]. By joining [employer]’s FollowMyHealth portal, you will also be able to view your health information using other applications (apps) compatible with our system.

## 2022-05-14 DIAGNOSIS — I50.32 CHRONIC DIASTOLIC (CONGESTIVE) HEART FAILURE: ICD-10-CM

## 2022-05-14 DIAGNOSIS — D32.0 BENIGN NEOPLASM OF CEREBRAL MENINGES: ICD-10-CM

## 2022-05-14 DIAGNOSIS — Z79.4 LONG TERM (CURRENT) USE OF INSULIN: ICD-10-CM

## 2022-05-14 DIAGNOSIS — R47.01 APHASIA: ICD-10-CM

## 2022-05-14 DIAGNOSIS — E44.0 MODERATE PROTEIN-CALORIE MALNUTRITION: ICD-10-CM

## 2022-05-14 DIAGNOSIS — E11.65 TYPE 2 DIABETES MELLITUS WITH HYPERGLYCEMIA: ICD-10-CM

## 2022-05-14 DIAGNOSIS — R60.0 LOCALIZED EDEMA: ICD-10-CM

## 2022-05-14 DIAGNOSIS — R13.10 DYSPHAGIA, UNSPECIFIED: ICD-10-CM

## 2022-05-14 DIAGNOSIS — E53.8 DEFICIENCY OF OTHER SPECIFIED B GROUP VITAMINS: ICD-10-CM

## 2022-05-14 DIAGNOSIS — G93.41 METABOLIC ENCEPHALOPATHY: ICD-10-CM

## 2022-05-14 DIAGNOSIS — G30.9 ALZHEIMER'S DISEASE, UNSPECIFIED: ICD-10-CM

## 2022-05-14 DIAGNOSIS — Z79.84 LONG TERM (CURRENT) USE OF ORAL HYPOGLYCEMIC DRUGS: ICD-10-CM

## 2022-05-14 DIAGNOSIS — E87.1 HYPO-OSMOLALITY AND HYPONATREMIA: ICD-10-CM

## 2022-05-14 DIAGNOSIS — I16.0 HYPERTENSIVE URGENCY: ICD-10-CM

## 2022-05-14 DIAGNOSIS — I11.0 HYPERTENSIVE HEART DISEASE WITH HEART FAILURE: ICD-10-CM

## 2022-05-14 DIAGNOSIS — E11.9 TYPE 2 DIABETES MELLITUS WITHOUT COMPLICATIONS: ICD-10-CM

## 2022-05-14 DIAGNOSIS — R41.82 ALTERED MENTAL STATUS, UNSPECIFIED: ICD-10-CM

## 2022-05-14 DIAGNOSIS — E78.5 HYPERLIPIDEMIA, UNSPECIFIED: ICD-10-CM

## 2022-05-14 DIAGNOSIS — G31.09 OTHER FRONTOTEMPORAL NEUROCOGNITIVE DISORDER: ICD-10-CM

## 2022-05-14 DIAGNOSIS — F02.80 DEMENTIA IN OTHER DISEASES CLASSIFIED ELSEWHERE, UNSPECIFIED SEVERITY, WITHOUT BEHAVIORAL DISTURBANCE, PSYCHOTIC DISTURBANCE, MOOD DISTURBANCE, AND ANXIETY: ICD-10-CM

## 2022-05-19 ENCOUNTER — EMERGENCY (EMERGENCY)
Facility: HOSPITAL | Age: 72
LOS: 0 days | Discharge: HOME | End: 2022-05-19
Attending: STUDENT IN AN ORGANIZED HEALTH CARE EDUCATION/TRAINING PROGRAM
Payer: MEDICAID

## 2022-05-19 VITALS
OXYGEN SATURATION: 99 % | HEART RATE: 109 BPM | HEIGHT: 64 IN | WEIGHT: 139.99 LBS | RESPIRATION RATE: 16 BRPM | DIASTOLIC BLOOD PRESSURE: 84 MMHG | SYSTOLIC BLOOD PRESSURE: 201 MMHG

## 2022-05-19 VITALS
SYSTOLIC BLOOD PRESSURE: 148 MMHG | TEMPERATURE: 98 F | RESPIRATION RATE: 18 BRPM | DIASTOLIC BLOOD PRESSURE: 76 MMHG | HEART RATE: 77 BPM | OXYGEN SATURATION: 100 %

## 2022-05-19 DIAGNOSIS — E78.5 HYPERLIPIDEMIA, UNSPECIFIED: ICD-10-CM

## 2022-05-19 DIAGNOSIS — F03.90 UNSPECIFIED DEMENTIA WITHOUT BEHAVIORAL DISTURBANCE: ICD-10-CM

## 2022-05-19 DIAGNOSIS — Z79.4 LONG TERM (CURRENT) USE OF INSULIN: ICD-10-CM

## 2022-05-19 DIAGNOSIS — E11.9 TYPE 2 DIABETES MELLITUS WITHOUT COMPLICATIONS: ICD-10-CM

## 2022-05-19 DIAGNOSIS — G47.00 INSOMNIA, UNSPECIFIED: ICD-10-CM

## 2022-05-19 DIAGNOSIS — Z79.82 LONG TERM (CURRENT) USE OF ASPIRIN: ICD-10-CM

## 2022-05-19 DIAGNOSIS — I10 ESSENTIAL (PRIMARY) HYPERTENSION: ICD-10-CM

## 2022-05-19 LAB
ALBUMIN SERPL ELPH-MCNC: 3 G/DL — LOW (ref 3.3–5)
ALP SERPL-CCNC: 73 U/L — SIGNIFICANT CHANGE UP (ref 40–120)
ALT FLD-CCNC: 22 U/L — SIGNIFICANT CHANGE UP (ref 12–78)
ANION GAP SERPL CALC-SCNC: 10 MMOL/L — SIGNIFICANT CHANGE UP (ref 5–17)
APPEARANCE UR: CLEAR — SIGNIFICANT CHANGE UP
AST SERPL-CCNC: 15 U/L — SIGNIFICANT CHANGE UP (ref 15–37)
BACTERIA # UR AUTO: ABNORMAL
BASOPHILS # BLD AUTO: 0.03 K/UL — SIGNIFICANT CHANGE UP (ref 0–0.2)
BASOPHILS NFR BLD AUTO: 0.3 % — SIGNIFICANT CHANGE UP (ref 0–2)
BILIRUB SERPL-MCNC: 0.5 MG/DL — SIGNIFICANT CHANGE UP (ref 0.2–1.2)
BILIRUB UR-MCNC: NEGATIVE — SIGNIFICANT CHANGE UP
BUN SERPL-MCNC: 12 MG/DL — SIGNIFICANT CHANGE UP (ref 7–23)
CALCIUM SERPL-MCNC: 9.6 MG/DL — SIGNIFICANT CHANGE UP (ref 8.5–10.1)
CHLORIDE SERPL-SCNC: 111 MMOL/L — HIGH (ref 96–108)
CO2 SERPL-SCNC: 22 MMOL/L — SIGNIFICANT CHANGE UP (ref 22–31)
COLOR SPEC: YELLOW — SIGNIFICANT CHANGE UP
CREAT SERPL-MCNC: 0.96 MG/DL — SIGNIFICANT CHANGE UP (ref 0.5–1.3)
DIFF PNL FLD: NEGATIVE — SIGNIFICANT CHANGE UP
EGFR: 62 ML/MIN/1.73M2 — SIGNIFICANT CHANGE UP
EOSINOPHIL # BLD AUTO: 0.28 K/UL — SIGNIFICANT CHANGE UP (ref 0–0.5)
EOSINOPHIL NFR BLD AUTO: 3.1 % — SIGNIFICANT CHANGE UP (ref 0–6)
EPI CELLS # UR: SIGNIFICANT CHANGE UP
GLUCOSE BLDC GLUCOMTR-MCNC: 133 MG/DL — HIGH (ref 70–99)
GLUCOSE SERPL-MCNC: 124 MG/DL — HIGH (ref 70–99)
GLUCOSE UR QL: 50 MG/DL
HCT VFR BLD CALC: 35 % — SIGNIFICANT CHANGE UP (ref 34.5–45)
HGB BLD-MCNC: 11.7 G/DL — SIGNIFICANT CHANGE UP (ref 11.5–15.5)
IMM GRANULOCYTES NFR BLD AUTO: 0.4 % — SIGNIFICANT CHANGE UP (ref 0–1.5)
KETONES UR-MCNC: NEGATIVE — SIGNIFICANT CHANGE UP
LEUKOCYTE ESTERASE UR-ACNC: NEGATIVE — SIGNIFICANT CHANGE UP
LYMPHOCYTES # BLD AUTO: 2.5 K/UL — SIGNIFICANT CHANGE UP (ref 1–3.3)
LYMPHOCYTES # BLD AUTO: 28 % — SIGNIFICANT CHANGE UP (ref 13–44)
MCHC RBC-ENTMCNC: 27.3 PG — SIGNIFICANT CHANGE UP (ref 27–34)
MCHC RBC-ENTMCNC: 33.4 G/DL — SIGNIFICANT CHANGE UP (ref 32–36)
MCV RBC AUTO: 81.6 FL — SIGNIFICANT CHANGE UP (ref 80–100)
MONOCYTES # BLD AUTO: 0.68 K/UL — SIGNIFICANT CHANGE UP (ref 0–0.9)
MONOCYTES NFR BLD AUTO: 7.6 % — SIGNIFICANT CHANGE UP (ref 2–14)
NEUTROPHILS # BLD AUTO: 5.4 K/UL — SIGNIFICANT CHANGE UP (ref 1.8–7.4)
NEUTROPHILS NFR BLD AUTO: 60.6 % — SIGNIFICANT CHANGE UP (ref 43–77)
NITRITE UR-MCNC: NEGATIVE — SIGNIFICANT CHANGE UP
NRBC # BLD: 0 /100 WBCS — SIGNIFICANT CHANGE UP (ref 0–0)
PH UR: 6.5 — SIGNIFICANT CHANGE UP (ref 5–8)
PLATELET # BLD AUTO: 291 K/UL — SIGNIFICANT CHANGE UP (ref 150–400)
POTASSIUM SERPL-MCNC: 4 MMOL/L — SIGNIFICANT CHANGE UP (ref 3.5–5.3)
POTASSIUM SERPL-SCNC: 4 MMOL/L — SIGNIFICANT CHANGE UP (ref 3.5–5.3)
PROT SERPL-MCNC: 6.8 GM/DL — SIGNIFICANT CHANGE UP (ref 6–8.3)
PROT UR-MCNC: NEGATIVE MG/DL — SIGNIFICANT CHANGE UP
RBC # BLD: 4.29 M/UL — SIGNIFICANT CHANGE UP (ref 3.8–5.2)
RBC # FLD: 13.2 % — SIGNIFICANT CHANGE UP (ref 10.3–14.5)
RBC CASTS # UR COMP ASSIST: SIGNIFICANT CHANGE UP /HPF (ref 0–4)
SODIUM SERPL-SCNC: 143 MMOL/L — SIGNIFICANT CHANGE UP (ref 135–145)
SP GR SPEC: 1 — LOW (ref 1.01–1.02)
UROBILINOGEN FLD QL: NEGATIVE MG/DL — SIGNIFICANT CHANGE UP
WBC # BLD: 8.93 K/UL — SIGNIFICANT CHANGE UP (ref 3.8–10.5)
WBC # FLD AUTO: 8.93 K/UL — SIGNIFICANT CHANGE UP (ref 3.8–10.5)
WBC UR QL: SIGNIFICANT CHANGE UP

## 2022-05-19 PROCEDURE — 99053 MED SERV 10PM-8AM 24 HR FAC: CPT

## 2022-05-19 PROCEDURE — 71045 X-RAY EXAM CHEST 1 VIEW: CPT | Mod: 26

## 2022-05-19 PROCEDURE — 99284 EMERGENCY DEPT VISIT MOD MDM: CPT

## 2022-05-19 RX ORDER — OLANZAPINE 15 MG/1
5 TABLET, FILM COATED ORAL ONCE
Refills: 0 | Status: COMPLETED | OUTPATIENT
Start: 2022-05-19 | End: 2022-05-19

## 2022-05-19 RX ADMIN — OLANZAPINE 5 MILLIGRAM(S): 15 TABLET, FILM COATED ORAL at 04:43

## 2022-05-19 NOTE — ED ADULT NURSE NOTE - NSIMPLEMENTINTERV_GEN_ALL_ED
Implemented All Fall Risk Interventions:  [place] to call system. Call bell, personal items and telephone within reach. Instruct patient to call for assistance. Room bathroom lighting operational. Non-slip footwear when patient is off stretcher. Physically safe environment: no spills, clutter or unnecessary equipment. Stretcher in lowest position, wheels locked, appropriate side rails in place. Provide visual cue, wrist band, yellow gown, etc. Monitor gait and stability. Monitor for mental status changes and reorient to person, place, and time. Review medications for side effects contributing to fall risk. Reinforce activity limits and safety measures with patient and family.

## 2022-05-19 NOTE — ED ADULT TRIAGE NOTE - CHIEF COMPLAINT QUOTE
as per EMS, pt's  called 911 because pt wasn't sleeping and was walking around the room mumbling to herself. pt is at baseline mental status per grandson according to EMS.  with EMS. unable to obtain temperature in triage as pt is agitated  hx: dementia, DM

## 2022-05-19 NOTE — ED PROVIDER NOTE - PATIENT PORTAL LINK FT
You can access the FollowMyHealth Patient Portal offered by [facility] by registering at the following website: [email or web address]. By joining [employer]’s FollowMyHealth portal, you will also be able to view your health information using other applications (apps) compatible with our system.

## 2022-05-19 NOTE — ED PROVIDER NOTE - CARE PLAN
Spiritual Care Assessment/Progress Note  Critical access hospital      NAME: Kilo Haynes      MRN: [id number]  AGE: 68 y.o.  SEX: female  Mandaen Affiliation: Rogers Haider   Language: English     1/21/2022     Total Time (in minutes): 15     Spiritual Assessment begun in [facility] through conversation with:         [x]Patient        [] Family    [] Friend(s)        Reason for Consult: Initial/Spiritual assessment, patient floor     Spiritual beliefs: (Please include comment if needed)     [x] Identifies with a iraida tradition: Rogers Haider        [] Supported by a iraida community:            [] Claims no spiritual orientation:           [] Seeking spiritual identity:                [] Adheres to an individual form of spirituality:           [] Not able to assess:                           Identified resources for coping:      [x] Prayer                               [] Music                  [] Guided Imagery     [x] Family/friends                 [] Pet visits     [] Devotional reading                         [] Unknown     [] Other:                                            Interventions offered during this visit: (See comments for more details)    Patient Interventions: Normalization of emotional/spiritual concerns           Plan of Care:     [x] Support spiritual and/or cultural needs    [] Support AMD and/or advance care planning process      [] Support grieving process   [] Coordinate Rites and/or Rituals    [] Coordination with community clergy   [] No spiritual needs identified at this time   [] Detailed Plan of Care below (See Comments)  [] Make referral to Music Therapy  [] Make referral to Pet Therapy     [] Make referral to Addiction services  [] Make referral to [facility]  [] Make referral to Spiritual Care Partner  [] No future visits requested        [x] Contact Spiritual Care for further referrals     Comments: Rounding on Evelyn 64. Patient shared quick they were [place] but did not feel up to a visit at this time.  respect patient feeling for not having a visit. Advised nurse to contact [facility] for any further referrals.     [address], [facility]    Please [facility]  in order to get in touch with  for any Spiritual Care Needs   [phone]   OR   Reach out to us on [address] [facility] [place] [facility] Discharge DX:	Dementia   1

## 2022-05-19 NOTE — ED PROVIDER NOTE - OBJECTIVE STATEMENT
72F PMHx of progressive decline of alzheimer disease vs frontotemporal dementia, HTN, IDDMII presenting with insomnia last night. Per EMS, was walking around home, speaking incoherently, unable to be redirected. Otherwise in the ED, well appearing, sleeping in stretcher.

## 2022-05-19 NOTE — ED PROVIDER NOTE - PROGRESS NOTE DETAILS
Son in ED, pending UA, CXR normal, labs unremarkable. Likely worsening of dementia, will need neurology follow up. (0) indicator not present

## 2022-05-19 NOTE — ED PROVIDER NOTE - CARE PROVIDER_API CALL
Reji Clay)  Neurology; Neurophysiology  [address], [address]  [place] [address]  Phone: [phone]  Fax: [phone]  Follow Up Time:

## 2022-05-19 NOTE — ED PROVIDER NOTE - CLINICAL SUMMARY MEDICAL DECISION MAKING FREE TEXT BOX
DDX: rule out infection, likely worsening of dementia.  - ua, cxr, labs, re-eval  - olanzapine IM for agitation. DDX: rule out infection, likely worsening of dementia.  - ua, cxr, labs, re-eval  - olanzapine IM for agitation.      Patient signed out at 0700 pending UA, presenting with worsening dementia. UA is negative. Patient to be discharged home with neurology followup    Son agrees with plan per prior provider

## 2022-05-19 NOTE — ED ADULT TRIAGE NOTE - NS ED TRIAGE AVPU SCALE
PM&R Progress Note:    HPI: Michael Whittington is a 68 y o  male with HTN, DM2, Gout, known NPH s/p VPS chronic left foot weakness, lumbar spinal stenosis who presented to the [facility] on 7/6/20 for worsening back pain and gait instability from known spondylolisthesis L5/S1  He was recommended to have operative intervention, and was taken to the OR on 7/6/20 by Dr Juan M Coombs for an elective L5-S1 transverse lumbar interbody fusion and deformity correction at L5-S1  Patient found to have functional deficits from baseline and admitted to [address] [place] [address] on 7/10/20  ASSESSMENT: Stable, progressing      PLAN:    Rehabilitation   Continue current rehabilitation plan of care to maximize function   Current function: ModA transfers and ambulation 10ft RW    Estimated Discharge: Home in 2 weeks  Pain   Controlled  DVT prophylaxis   Heparin    Bladder plan   Incontinent at times  Bowel plan   Continent    * Spondylolisthesis of lumbosacral region  Assessment & Plan  s/p L5-S1 transverse lumbar interbody fusion and deformity correction at L5-S1 on 7/6/20 by Dr Bill Castillo healing well with staples intact  Scalp laceration  Assessment & Plan  Sutures in place from scalp closure 4 days ago  Pain  Assessment & Plan  Managed on Tylenol and Robaxin  P r n  Oxycodone    ZHOU on CPAP  Assessment & Plan  Continue nocturnal CPAP home settings    Gout  Assessment & Plan  Managed on allopurinol    Essential hypertension  Assessment & Plan  Stable without blood pressure medication    Normal pressure hydrocephalus (HCC)  Assessment & Plan  Chronic SDH        Appreciate IM consultants medical co-management  Labs, medications, and imaging personally reviewed  SUBJECTIVE: Patient seen face to face  No acute issues  Progressing as expected in rehabilitation  Incisional pain is well controlled  Pt denies any other complaints      ROS:  A ten point review of systems was completed 7/13/2020 and pertinent positives are listed in subjective section  All other systems reviewed were negative  Review of Systems   Constitutional: Negative  Genitourinary: Negative  Hematological: Negative  Psychiatric/Behavioral: Negative  All other systems reviewed and are negative  OBJECTIVE:   /60 (BP Location: Right arm)   Pulse 58   Temp (!) 97 3 °F (36 3 °C) (Oral)   Resp 20   Ht 6' 3" (1 905 m)   Wt 125 kg (276 lb 8 oz)   SpO2 99%   BMI 34 56 kg/m²     Physical Exam   Constitutional: He is oriented to person, place, and time  He appears well-developed and well-nourished  HENT:   Head: Normocephalic and atraumatic  Mouth/Throat: Oropharynx is clear and moist    Eyes: Pupils are equal, round, and reactive to light  EOM are normal    Neck: Normal range of motion  Neck supple  Cardiovascular: Normal rate  Pulmonary/Chest: Effort normal    Abdominal: Soft  He exhibits no distension  There is no tenderness  Musculoskeletal: Normal range of motion  Neurological: He is alert and oriented to person, place, and time  Skin: Skin is warm and dry  Incision healing well with staples intact  Psychiatric: He has a normal mood and affect  Vitals reviewed         Lab Results   Component Value Date    WBC 8 91 07/13/2020    HGB 14 6 07/13/2020    HCT 45 0 07/13/2020    MCV 88 07/13/2020     07/13/2020     Lab Results   Component Value Date    SODIUM 134 (L) 07/13/2020    K 3 9 07/13/2020     07/13/2020    CO2 26 07/13/2020    BUN 19 07/13/2020    CREATININE 0 97 07/13/2020    GLUC 141 (H) 07/13/2020    CALCIUM 9 1 07/13/2020     Lab Results   Component Value Date    INR 1 06 06/16/2020    INR 1 14 05/28/2020    PROTIME 13 4 06/16/2020    PROTIME 14 0 05/28/2020           Current Facility-Administered Medications:     acetaminophen (TYLENOL) tablet 650 mg, 650 mg, Oral, Q8H [facility], Diane Cardoza MD, 650 mg at 07/13/20 0537    allopurinol (ZYLOPRIM) tablet 300 mg, 300 mg, Oral, Daily, Ai Cervantes MD, 300 mg at 07/13/20 0835    bisacodyl (DULCOLAX) rectal suppository 10 mg, 10 mg, Rectal, Daily PRN, Ai Cervantes MD    docusate sodium (COLACE) capsule 100 mg, 100 mg, Oral, BID, Ai Cervantes MD, 100 mg at 07/10/20 1705    heparin (porcine) subcutaneous injection 5,000 Units, 5,000 Units, Subcutaneous, Q8H [address], Ai Cervantes MD, 5,000 Units at 07/13/20 0537    insulin lispro (HumaLOG) 100 units/mL subcutaneous injection 1-5 Units, 1-5 Units, Subcutaneous, TID AC, 1 Units at 07/13/20 6975 **AND** Fingerstick Glucose (POCT), , , TID AC, AR Mchugh    insulin lispro (HumaLOG) 100 units/mL subcutaneous injection 1-5 Units, 1-5 Units, Subcutaneous, HS, AR Mchugh    lisinopril (ZESTRIL) tablet 10 mg, 10 mg, Oral, Daily, Sotomayor Yale, DO, 10 mg at 07/13/20 0835    melatonin tablet 6 mg, 6 mg, Oral, HS, Ai Cervantes MD, 6 mg at 07/12/20 2158    methocarbamol (ROBAXIN) tablet 500 mg, 500 mg, Oral, Q6H [address], Ai Cervantes MD, 500 mg at 07/13/20 1201    ondansetron (ZOFRAN-ODT) dispersible tablet 4 mg, 4 mg, Oral, Q6H PRN, Ai Cervantes MD    oxyCODONE (ROXICODONE) IR tablet 2 5 mg, 2 5 mg, Oral, Q4H PRN, Ai Cervantes MD    oxyCODONE (ROXICODONE) IR tablet 5 mg, 5 mg, Oral, Q4H PRN, Ai Cervantes MD    polyethylene glycol (MIRALAX) packet 17 g, 17 g, Oral, Daily PRN, Ai Cervantes MD    senna (SENOKOT) tablet 8 6 mg, 1 tablet, Oral, Daily, Ai Cervantes MD    travoprost (TRAVATAN-Z) 0 004 % ophthalmic solution 1 drop, 1 drop, Right Eye, HS, Ai Cervantes MD, 1 drop at 07/12/20 3145    Past Medical History:   Diagnosis Date    Cancer [facility])     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     Diabetes mellitus ([facility] )     DM2 (diabetes mellitus, type 2) ([facility] )     Gout     HTN (hypertension)     Hydrocephalus ([facility] )     Hypertension     Neuropathy     ZHOU on CPAP     Pressure injury of skin     TIA (transient ischemic attack) Patient Active Problem List    Diagnosis Date Noted    Spondylolisthesis of lumbosacral region 07/02/2020     Priority: High    Weakness of left foot 07/10/2020    Pain 07/10/2020    Scalp hematoma 07/10/2020    Scalp laceration 07/10/2020    Impaired functional mobility, balance, gait, and endurance 07/02/2020    Diabetic ulcer of left foot associated with type 2 diabetes mellitus ([facility] ) 05/28/2020    Type 2 diabetes mellitus ([facility] ) 05/28/2020    Normal pressure hydrocephalus ([facility] ) 05/28/2020    Essential hypertension 05/28/2020    Gout 05/28/2020    ZHOU on CPAP 05/28/2020    Status post ventriculoperitoneal shunt 04/21/2020    Unspecified abnormalities of gait and mobility 04/21/2020          Alexandria Bobby PA-C    Total time spent:  30 minutes with more than 50% spent counseling/coordinating care  Counseling includes discussion with patient re: progress and discussion with patient of his/her current medical state/information  Coordination of patient's care was performed in conjunction with consulting services  Time invested included review of patient's labs, vitals, and management of their comorbidities with continued monitoring  The care of the patient was extensively discussed and appropriate treatment plan was formulated unique for this patient  ** Please Note:  voice to text software may have been used in the creation of this document   Although proof errors in transcription or interpretation are a potential of such software** Verbal - The patient responds to verbal stimuli by opening their eyes when someone speaks to them. The patient is not fully oriented to time, place, or person.

## 2022-05-19 NOTE — ED ADULT NURSE NOTE - OBJECTIVE STATEMENT
per EMS, pt baseline confused, pt with hx of dementia, poor hx at this time. as per EMS, pt's  called 911 because pt wasn't sleeping and was walking around the room mumbling to herself. pt noted to be restless.

## 2022-05-20 LAB
CULTURE RESULTS: SIGNIFICANT CHANGE UP
SPECIMEN SOURCE: SIGNIFICANT CHANGE UP

## 2022-05-31 ENCOUNTER — EMERGENCY (EMERGENCY)
Facility: HOSPITAL | Age: 72
LOS: 0 days | Discharge: HOME | End: 2022-05-31
Attending: STUDENT IN AN ORGANIZED HEALTH CARE EDUCATION/TRAINING PROGRAM
Payer: MEDICARE

## 2022-05-31 VITALS
HEART RATE: 55 BPM | RESPIRATION RATE: 17 BRPM | SYSTOLIC BLOOD PRESSURE: 179 MMHG | DIASTOLIC BLOOD PRESSURE: 91 MMHG | OXYGEN SATURATION: 98 %

## 2022-05-31 VITALS
HEIGHT: 64 IN | OXYGEN SATURATION: 98 % | HEART RATE: 56 BPM | DIASTOLIC BLOOD PRESSURE: 98 MMHG | TEMPERATURE: 99 F | SYSTOLIC BLOOD PRESSURE: 146 MMHG | WEIGHT: 139.99 LBS | RESPIRATION RATE: 18 BRPM

## 2022-05-31 DIAGNOSIS — G47.00 INSOMNIA, UNSPECIFIED: ICD-10-CM

## 2022-05-31 DIAGNOSIS — Z79.82 LONG TERM (CURRENT) USE OF ASPIRIN: ICD-10-CM

## 2022-05-31 DIAGNOSIS — F03.90 UNSPECIFIED DEMENTIA WITHOUT BEHAVIORAL DISTURBANCE: ICD-10-CM

## 2022-05-31 DIAGNOSIS — E78.5 HYPERLIPIDEMIA, UNSPECIFIED: ICD-10-CM

## 2022-05-31 DIAGNOSIS — R45.1 RESTLESSNESS AND AGITATION: ICD-10-CM

## 2022-05-31 DIAGNOSIS — E11.9 TYPE 2 DIABETES MELLITUS WITHOUT COMPLICATIONS: ICD-10-CM

## 2022-05-31 DIAGNOSIS — Z91.83 WANDERING IN DISEASES CLASSIFIED ELSEWHERE: ICD-10-CM

## 2022-05-31 DIAGNOSIS — Z79.4 LONG TERM (CURRENT) USE OF INSULIN: ICD-10-CM

## 2022-05-31 DIAGNOSIS — I10 ESSENTIAL (PRIMARY) HYPERTENSION: ICD-10-CM

## 2022-05-31 PROCEDURE — 99283 EMERGENCY DEPT VISIT LOW MDM: CPT

## 2022-05-31 NOTE — ED PROVIDER NOTE - CLINICAL SUMMARY MEDICAL DECISION MAKING FREE TEXT BOX
sent in by family for trouble sleeping, wandering around the house  pt sleeping comfortably in stretcher

## 2022-05-31 NOTE — ED PROVIDER NOTE - PATIENT PORTAL LINK FT
You can access the FollowMyHealth Patient Portal offered by [facility] by registering at the following website: [email or web address]. By joining [employer]’s FollowMyHealth portal, you will also be able to view your health information using other applications (apps) compatible with our system.

## 2022-05-31 NOTE — ED PROVIDER NOTE - OBJECTIVE STATEMENT
72F hx dementia, hld, htn, dm, presenting to the ED for insomnia and restlessness at home. Family called at house but no answer. Pt mostly calm, sleeping in stretcher in ED. Past visits to ED for similar complaints with negative infectious workups. Will monitor pt until AM then likely DC.

## 2022-05-31 NOTE — ED ADULT NURSE NOTE - NSIMPLEMENTINTERV_GEN_ALL_ED
Implemented All Fall Risk Interventions:  [place] to call system. Call bell, personal items and telephone within reach. Instruct patient to call for assistance. Room bathroom lighting operational. Non-slip footwear when patient is off stretcher. Physically safe environment: no spills, clutter or unnecessary equipment. Stretcher in lowest position, wheels locked, appropriate side rails in place. Provide visual cue, wrist band, yellow gown, etc. Monitor gait and stability. Monitor for mental status changes and reorient to person, place, and time. Review medications for side effects contributing to fall risk. Reinforce activity limits and safety measures with patient and family. 2

## 2022-05-31 NOTE — ED ADULT NURSE NOTE - CHIEF COMPLAINT QUOTE
as per EMS, pt has hx dementia and was restless at home. family frustrated and unable to handle per EMS. BGL 96 with EMS  Jacky Ly (son): [phone]

## 2022-05-31 NOTE — ED PROVIDER NOTE - PHYSICAL EXAMINATION
Constitutional: Sleeping in stretcher.  ENMT: Airway patent.  Eyes: Clear bilaterally, pupils equal, round and reactive to light.  Cardiac: Normal rate, regular rhythm.  Heart sounds S1, S2.  Respiratory: Breath sounds clear and equal bilaterally.  Gastrointestinal: Abdomen soft, non-tender, no guarding.  Neurological: AOx0 commensurate with advanced dementia.  Skin: No evidence of rash.

## 2022-05-31 NOTE — ED ADULT NURSE NOTE - OBJECTIVE STATEMENT
pt placed in bed 12. as per EMS, pt has hx dementia and was restless at home. family frustrated and unable to handle per EMS. BGL 96 with EMS pt placed in bed 12. pt poor historian. as per EMS, pt has hx dementia and was restless at home. family frustrated and unable to handle per EMS. BGL 96 with EMS

## 2022-05-31 NOTE — ED PROVIDER NOTE - NSFOLLOWUPINSTRUCTIONS_ED_ALL_ED_FT
Dementia      Dementia is a condition that affects the way the brain functions. It often affects memory and thinking. Usually, dementia gets worse with time and cannot be reversed (progressive dementia). There are many types of dementia, including:  •Alzheimer's disease. This type is the most common.      •Vascular dementia. This type may happen as the result of a stroke.      •Lewy body dementia. This type may happen to people who have Parkinson's disease.      •Frontotemporal dementia. This type is caused by damage to nerve cells (neurons) in certain parts of the brain.      Some people may be affected by more than one type of dementia. This is called mixed dementia.      What are the causes?    Dementia is caused by damage to cells in the brain. The area of the brain and the types of cells damaged determine the type of dementia. Usually, this damage is irreversible or cannot be undone. Some examples of irreversible causes include:  •Conditions that affect the blood vessels of the brain, such as diabetes, heart disease, or blood vessel disease.      •Genetic mutations.      In some cases, changes in the brain may be caused by another condition and can be reversed or slowed. Some examples of reversible causes include:  •Injury to the brain.      •Certain medicines.      •Infection, such as meningitis.      •Metabolic problems, such as vitamin B12 deficiency or thyroid disease.      •Pressure on the brain, such as from a tumor, blood clot, or too much fluid in the brain (hydrocephalus).      •Autoimmune diseases that affect the brain or arteries, such as limbic encephalitis or vasculitis.        What are the signs or symptoms?    Symptoms of dementia depend on the type of dementia. Common signs of dementia include problems with remembering, thinking, problem solving, decision making, and communicating. These signs develop slowly or get worse with time. This may include:  •Problems remembering events or people.      •Having trouble taking a bath or putting clothes on.      •Forgetting appointments or forgetting to pay bills.      •Difficulty planning and preparing meals.      •Having trouble speaking.      •Getting lost easily.      •Changes in behavior or mood.        How is this diagnosed?     This condition is diagnosed by a specialist (neurologist). It is diagnosed based on the history of your symptoms, your medical history, a physical exam, and tests. Tests may include:  •Tests to evaluate brain function, such as memory tests, cognitive tests, and other tests.      •Lab tests, such as blood or urine tests.      •Imaging tests, such as a CT scan, a PET scan, or an MRI.      •Genetic testing. This may be done if other family members have a diagnosis of certain types of dementia.      Your health care provider will talk with you and your family, friends, or caregivers about your history and symptoms.      How is this treated?    Treatment for this condition depends on the cause of the dementia. Progressive dementias, such as Alzheimer's disease, cannot be cured, but there may be treatments that help to manage symptoms.    Treatment might involve taking medicines that may help to:  •Control the dementia.      •Slow down the progression of the dementia.      •Manage symptoms.      In some cases, treating the cause of your dementia can improve symptoms, reverse symptoms, or slow down how quickly your dementia becomes worse.    Your health care provider can direct you to support groups, organizations, and other health care providers who can help with decisions about your care.      Follow these instructions at home:    Medicines     •Take over-the-counter and prescription medicines only as told by your health care provider.      •Use a pill organizer or pill reminder to help you manage your medicines.      •Avoid taking medicines that can affect thinking, such as pain medicines or sleeping medicines.      Lifestyle   •Make healthy lifestyle choices.  •Be physically active as told by your health care provider.      •Do not use any products that contain nicotine or tobacco, such as cigarettes, e-cigarettes, and chewing tobacco. If you need help quitting, ask your health care provider.      •Do not drink alcohol.      •Practice stress-management techniques when you get stressed.      •Spend time with other people.      •Make sure to get quality sleep. These tips can help you get a good night's rest:  •Avoid napping during the day.      •Keep your sleeping area dark and cool.      •Avoid exercising during the few hours before you go to bed.      •Avoid caffeine products in the evening.        Eating and drinking     •Drink enough fluid to keep your urine pale yellow.      •Eat a healthy diet.        General instructions      •Work with your health care provider to determine what you need help with and what your safety needs are.      •Talk with your health care provider about whether it is safe for you to drive.      •If you were given a bracelet that identifies you as a person with memory loss or tracks your location, make sure to wear it at all times.      •Work with your family to make important decisions, such as advance directives, medical power of , or a living will.      •Keep all follow-up visits. This is important.        Where to find more information    •Alzheimer's Association: www.alz.org      •[place] [employer] [place]: www.александр.nih.gov/alzheimers      •World Health Organization: www.who.int        Contact a health care provider if:    •You have any new or worsening symptoms.      •You have problems with choking or swallowing.        Get help right away if:    •You feel depressed or sad, or feel that you want to harm yourself.      •Your family members become concerned for your safety.      If you ever feel like you may hurt yourself or others, or have thoughts about taking your own life, get help right away. Go to your nearest emergency department or:   • Call your local emergency services (661 in the [place]       • Call a suicide crisis helpline, such as the National Suicide Prevention Lifeline at 1-770.452.5591. This is open 24 hours a day in the [place]       • Text the Crisis Text Line at 601957 (in the [place]         Summary    •Dementia is a condition that affects the way the brain functions. Dementia often affects memory and thinking.      •Usually, dementia gets worse with time and cannot be reversed (progressive dementia).      •Treatment for this condition depends on the cause of the dementia.      •Work with your health care provider to determine what you need help with and what your safety needs are.      •Your health care provider can direct you to support groups, organizations, and other health care providers who can help with decisions about your care.

## 2022-05-31 NOTE — ED ADULT NURSE REASSESSMENT NOTE - NS ED NURSE REASSESS COMMENT FT1
contact made with family per dr. schuster. plan to d/c home via ambulance p/u. pt sleeping in stretcher. NAD noted.

## 2022-05-31 NOTE — ED ADULT TRIAGE NOTE - CHIEF COMPLAINT QUOTE
as per EMS, pt has hx dementia and was restless at home. family frustrated and unable to handle per EMS. BGL 96 with EMS as per EMS, pt has hx dementia and was restless at home. family frustrated and unable to handle per EMS. BGL 96 with EMS  Jacky Ly (son): [phone]

## 2022-11-15 NOTE — ED ADULT TRIAGE NOTE - HISTORY OF COVID-19 VACCINATION
Additional History: Patient believes lesion is a wart that comes and goes. She does not treat with any otc products when lesion is present. Vaccine status unknown

## 2022-12-01 NOTE — ED ADULT TRIAGE NOTE - PRO INTERPRETER NEED 2
@ 34.4wks in PTL  on amp for GBS unknown ppx  expectant mgmt at this time  Kalpana Campbell MD
AROM clear  on 4mu/min of pit  ant   Kalpana Campbell MD
A/P:  -EFM: resuscitative measures prn  -augment with pitocin  -anesthesia reinforcement prn  -anticipate     d/w Dr. Campbell
A/P: 33yoF  @34.5 weeks gestation admitted for PTL, PNC c/b GDMA2; GBS unknown. Cat 2 tracing  -Expt mgmt   -EFM continuous monitoring   -IUPC/AI  -Resuscitative measures prn  -Peanut ball for descend   -Finger sticks q4 hours in latent labor; q2 hours in active labor  -Rotating fluids as per GDM protocol  -Epidural for pain mgmt   -Ampicillin for GBS unknown  -Anticipated   -D/w JUNIOR Watson-C  
A/P:  -EFM: resuscitative measures prn  -continue expectant management  -repeat VE @ 2a  -anticipate     d/w Dr. Campbell
English

## 2023-01-03 NOTE — DISCHARGE NOTE PROVIDER - DISCHARGE DATE
1/3/2023    Patient: Mary Roman   YOB: 1984   Date of Visit: 1/3/2023     Ishmael Kumar MD  [address] [place] [address]  Via In Basket    Dear Ishmael Kumar MD,      Thank you for referring Ms. Army Monroy to [facility] for evaluation. My notes for this consultation are attached. If you have questions, please do not hesitate to call me. I look forward to following your patient along with you.       Sincerely,    Hugo Gomez MD 27-Apr-2022 11-May-2022

## 2023-01-23 NOTE — DIETITIAN NUTRITION RISK NOTIFICATION - PHYSICAL ASSESSMENT SHOULDERS
Radiology Post-Procedure Note    Pre Op Diagnosis: Malfunctioning HD line    Post Op Diagnosis: Same    Procedure: Tunneled line exchange, fibrin sheath angioplasty    Procedure performed by: Donn Keenan MD    Written Informed Consent Obtained: Yes  Specimen Removed: NO  Estimated Blood Loss: Minimal    Findings:   Existing catheter removed over the wire. Venogram demonstrates fibrin sheath which was angiplastied with 12 mm balloon catheter. New 19 cm tip to cuff Glidepath placed. Final venogram demonstrates good flow into RA. No complications. See dictation. Line ready for use.    Patient tolerated procedure well.    Donn Keenan M.D.  Interventional Radiology  Department of Radiology  Pager: [phone]     mild

## 2023-10-17 NOTE — ED ADULT NURSE NOTE - CHIEF COMPLAINT
What Is The Reason For Today's Visit?: Full Body Skin Examination What Is The Reason For Today's Visit? (Being Monitored For X): concerning skin lesions on an annual basis The patient is a 69y Female complaining of urinary symptoms.

## 2023-12-27 NOTE — ED ADULT TRIAGE NOTE - HEIGHT IN CM
Right Carpal Tunnel Injection    Date/Time: 12/27/2023 8:00 AM    Performed by: Kristin Call MD  Authorized by: Kristin Call MD    Consent Done?:  Yes (Verbal)  Indications:  Pain and diagnostic evaluation  Timeout: prior to procedure the correct patient, procedure, and site was verified    Local anesthesia used?: Yes    Anesthesia:  Local infiltration  Local anesthetic:  Lidocaine 1% without epinephrine  Anesthetic total (ml):  1    Location:  Wrist  Ultrasonic Guidance for Needle Placement?: No    Needle size:  22 G  Approach:  Volar  Medications:  40 mg triamcinolone acetonide 40 mg/mL  Patient tolerance:  Patient tolerated the procedure well with no immediate complications     165.1

## 2024-02-28 NOTE — PROGRESS NOTE ADULT - ASSESSMENT
Daily Note     Today's date: 2024  Patient name: Michelle Gonzales  : 1988  MRN: [id number]  Referring provider: Yolie Ron MD  Dx:   Encounter Diagnosis     ICD-10-CM    1. Mixed stress and urge urinary incontinence  N39.46         Objective: PFM exam: resting tone- WNL; contract- 2+/5 with PERFECT 2+/4/3/NT; bear down- min tone in levator ani otherwise WNL.    Assessment: PFM strength has improved since start of care with resolution of downward bladder movement during attempted PFM cxn. Given her recent flare in symptoms with repeated coughing, I suspect that her PFM strength and endurance has improved even more than is measured today, with mild setback. Provided reassurance and reinforcement of progress and prognosis.    Plan: Continue per plan of care.            72 YOF PMHx dementia, HTN, IDDMII presenting with worsened AMS.    Metabolic encephalopathy due a progressive decline of alzhemiers vs frontaltemporal dementia  daughter reports patient has been seen by x neurologists and was tried on aricept and namenda in hopes it would slow the decline  Neurology appreciated.  we cannot rule out polypharmacy.   Today patient is "marginally more alert"  No signs of infectious cause.   MRI with artifact although no concerning signs of CVA  plan  extensive counceling given to patients daughter . she understands patients progression    Hyponatremia, related to HCTZ  resolved      HTN / HTN urgency   lisinopril 20mg daily    IDDM type II  sliding scale    Dysphagia: diet puree, mod thick, speech eval.    vte prophy: lovenox    dispo PT recommending SNF, family agreeable. SW on case looking for facilities

## 2024-06-19 NOTE — ED PROVIDER NOTE - PHYSICAL EXAMINATION
Detail Level: Detailed VITAL SIGNS: I have reviewed nursing notes and confirm.   GEN: Well-developed; well-nourished; in no acute distress. Speaking full sentences.  SKIN: Warm, pink, no rash, no diaphoresis, no cyanosis, well perfused.   HEAD: Normocephalic; atraumatic. No scalp lacerations, no abrasions.  NECK: Supple; non tender.   EYES: Pupils 3mm equal, round, reactive to light and accomodation, conjunctiva and sclera clear. Extra-ocular movements intact bilaterally.  ENT: No nasal discharge; airway clear. Trachea is midline. ORAL: No oropharyngeal exudates or erythema. Normal dentition.  CV: Regular rate and rhythm. S1, S2 normal; no murmurs, gallops, or rubs. No lower extremity pitting edema bilaterally. Capillary refill < 2 seconds throughout. Distal pulses intact 2+ throughout.  RESP: CTA bilaterally. No wheezes, rales, or rhonchi.   ABD: Normal bowel sounds, soft, non-distended, non-tender, no rebound, no guarding, no rigidity, no hepatosplenomegaly. No CVA tenderness bilaterally.  MSK: Normal range of motion and movement of all 4 extremities. No apparent joint or muscular pain throughout   BACK: No thoracolumbar midline or paravertebral tenderness. No step-offs or obvious deformities.  NEURO: Alert & oriented x 0, moving all extremities

## 2025-01-15 NOTE — ED ADULT TRIAGE NOTE - TEMPERATURE IN FAHRENHEIT (DEGREES F)
Date Of Fraction 5: 12/20/2024 Date Of Fraction 23: 01/17/2025 Is Therapy Completed?: No Date Of Fraction 9: 12/27/2024 Date Of Fraction 3: 12/18/2024 Treatment Margins In Cm: 0 Date Of Fraction 18: 01/10/2025 Date Of Fraction 7: 12/24/2024 Radiation Units: cGy Date Of Fraction 4: 12/19/2024 Location Override (Location Will Render As Ema Body Location If Left Blank): left venecia bowl Date Of Fraction 12: 01/02/2025 Date Of Fraction 11: 12/31/2024 Date Of Fraction 15: 01/07/2025 Date Of Fraction 25: 01/21/2025 Send Cpt Codes To Pm?: Yes 97.6 Date Of Fraction 22: 01/16/2025 Assessment: Appropriate reaction Date Of Fraction 21: 01/15/2025 Detail Level: Simple Date Of Fraction 8: 12/26/2024 Date Of Fraction 17: 01/09/2025 Total Planned Fractions: 32 Date Of Fraction 20: 01/14/2025 Date Of Fraction 2: 12/17/2024 Mild, Moderate, Brisk Erythema Or Dry Desquamation Counseling: The patient was instructed in meticulous wound care and counseled on potential and expected side effects of treatment and reasonable expectations for the time to heal. They appeared to have all of their questions answered to their satisfaction. They were recommended to rinse the treatment site with mild soap and water (recommended Dove, Neutrogena or Cetaphil). After rinsing the treatment site twice a day they are to apply a pea-sized amount of Aquaphor healing ointment twice daily. Aquaphor samples were provided. The patient understands to call with any questions, concerns or difficulties. They were informed of the potentital for infection and counseled on common signs and symptoms of infection including skin redness extending outside of the treatment area, enlargement or skin breakdown, significant warmth, pain, fever or chills or sweats. They voiced an understanding to contact us immediately if any of these symptoms develop. Their follow up appointment was scheduled. They were also instructed to follow-up with dermatology for skin cancer surveillance. Date Of Fraction 16: 01/08/2025 Early, Moist Desquamation Counseling: The patient was instructed in meticulous wound care and counseled on potential and expected side effects of treatment and reasonable expectations for the time to heal. They appeared to have all of their questions answered to their satisfaction. They were recommended to cleanse the treatment site with dilute hydrogen peroxide and water (using 50:50 ratio). They were told how to apply the solution gently with a cotton ball twice daily. After cleaning, they were instructed to pat the area dry with a soft cloth and then apply a small amount of Silvadene 1% cream twice daily. They were told to return to the clinic in 7 days for re-evaluation. The patient understands to call with any questions, concerns or difficulties. They were informed of the potentital for infection and counseled on common signs and symptoms of infection including skin redness extending outside of the treatment area, enlargement or skin breakdown, significant warmth, pain, fever or chills or sweats. They voiced an understanding to contact us immediately if any of these symptoms develop. Their follow up appointment was scheduled. They were also instructed to follow-up with dermatology for skin cancer surveillance. Date Of Fraction 1: 12/16/2024 Date Of Fraction 6: 12/23/2024 Date Of Fraction 19: 01/13/2025 Intro Statement (Will Not Render If Left Blank): The patient is undergoing electron beam therapy for skin cancer and presents for weekly evaluation and management. They are fully aware of risks and side effects and these have been discussed in detail. They know there are no guarantees regarding outcome. They demonstrated comprehension regarding the above. Daily Dosage Administered: 200 Date Of Fraction 14: 01/06/2025 Date Of Fraction 13: 01/03/2025 Date Of Fraction 10: 12/30/2024 Total Rx Dosage: 3480 Ebt Cpt Code (Please Add Code Details Below):  Date Of Fraction 24: 01/20/2025

## 2025-03-18 NOTE — ED PROVIDER NOTE - DISCUSSED CLINICAL AND RADIOLOGICAL FINDINGS WITH, MDM
Pt called back and would like this to please be faxed to her at [phone] this is directly to her at work.   patient